# Patient Record
Sex: FEMALE | Race: BLACK OR AFRICAN AMERICAN | Employment: UNEMPLOYED | URBAN - METROPOLITAN AREA
[De-identification: names, ages, dates, MRNs, and addresses within clinical notes are randomized per-mention and may not be internally consistent; named-entity substitution may affect disease eponyms.]

---

## 2018-03-17 ENCOUNTER — APPOINTMENT (OUTPATIENT)
Dept: CT IMAGING | Age: 38
End: 2018-03-17
Attending: EMERGENCY MEDICINE
Payer: MEDICAID

## 2018-03-17 ENCOUNTER — HOSPITAL ENCOUNTER (EMERGENCY)
Age: 38
Discharge: HOME | End: 2018-03-17
Attending: EMERGENCY MEDICINE | Admitting: EMERGENCY MEDICINE
Payer: MEDICAID

## 2018-03-17 VITALS
TEMPERATURE: 97.9 F | SYSTOLIC BLOOD PRESSURE: 159 MMHG | HEART RATE: 78 BPM | HEIGHT: 71 IN | RESPIRATION RATE: 18 BRPM | OXYGEN SATURATION: 98 % | DIASTOLIC BLOOD PRESSURE: 86 MMHG | WEIGHT: 276 LBS | BODY MASS INDEX: 38.64 KG/M2

## 2018-03-17 DIAGNOSIS — R51.9 ACUTE NONINTRACTABLE HEADACHE, UNSPECIFIED HEADACHE TYPE: Primary | ICD-10-CM

## 2018-03-17 PROCEDURE — 70450 CT HEAD/BRAIN W/O DYE: CPT

## 2018-03-17 PROCEDURE — 99283 EMERGENCY DEPT VISIT LOW MDM: CPT

## 2018-03-17 RX ORDER — TRAMADOL HYDROCHLORIDE 50 MG/1
50 TABLET ORAL
Qty: 10 TAB | Refills: 0 | Status: SHIPPED | OUTPATIENT
Start: 2018-03-17 | End: 2018-03-27

## 2018-03-17 RX ORDER — AMLODIPINE BESYLATE 10 MG/1
TABLET ORAL DAILY
COMMUNITY
End: 2018-07-12 | Stop reason: SDUPTHER

## 2018-03-18 NOTE — ED NOTES
Patient present to ED with c/o headache and dizziness that comes and goes. Patient also states right hand tingling around the fingers. Patient states that she is prescribed blood pressure medication that she does not take consistently.

## 2018-03-18 NOTE — DISCHARGE INSTRUCTIONS

## 2018-03-18 NOTE — ED NOTES
Emergency Department Nursing Plan of Care       The Nursing Plan of Care is developed from the Nursing assessment and Emergency Department Attending provider initial evaluation. The plan of care may be reviewed in the ED Provider note. The Plan of Care was developed with the following considerations:   Patient / Family readiness to learn indicated by:verbalized understanding  Persons(s) to be included in education: patient  Barriers to Learning/Limitations:No    Signed     [address] Sudarshan Jj RN    3/17/2018   10:36 PM      I have reviewed discharge instructions with the patient. The patient verbalized understanding. Patient ambulate out of ED in no acute distress.

## 2018-03-18 NOTE — ED PROVIDER NOTES
EMERGENCY DEPARTMENT HISTORY AND PHYSICAL EXAM      Date: 3/17/2018  Patient Name: Art Vuong    History of Presenting Illness     Chief Complaint   Patient presents with    Dizziness     Pt has hx of HTN. Hasn't taken her Amlodipine in months. Pt states her BP was high earlier today so she took her bp medication. Pt c.o tingling in R hand, headache, dizzyness, and sharp pains in her face. History Provided By: Patient    HPI: Art Vuong, 42 y.o. female with PMHx significant for HTN, presents ambulatory to the ED with cc of a 3/10 aching left sided intermittent headache. She states that she was diagnosed with HTN a few months ago and was prescribed amlodipine, but denies having been taking it regularly. She states, \" I am in denial of my HTN. \" She reports having checked her blood pressure at home today and notes that it was 176/103. She denies ear pain and sore throat. She also denies having taken anything for her headache. PCP: No primary care provider on file. There are no other complaints, changes, or physical findings at this time. Current Outpatient Prescriptions   Medication Sig Dispense Refill    amLODIPine (NORVASC) 10 mg tablet Take  by mouth daily. Past History     Past Medical History:  Past Medical History:   Diagnosis Date    Hypertension        Past Surgical History:  History reviewed. No pertinent surgical history. Family History:  History reviewed. No pertinent family history. Social History:  Social History   Substance Use Topics    Smoking status: Current Every Day Smoker     Packs/day: 0.25    Smokeless tobacco: Never Used    Alcohol use None       Allergies:  No Known Allergies      Review of Systems   Review of Systems   Constitutional: Negative. Negative for activity change, chills and diaphoresis. HENT: Negative. Negative for ear pain, sore throat, trouble swallowing and voice change. Eyes: Negative.     Respiratory: Negative for chest tightness and shortness of breath. Cardiovascular: Negative for chest pain, palpitations and leg swelling. Gastrointestinal: Negative. Negative for constipation, nausea and vomiting. Endocrine: Negative. Genitourinary: Negative. Negative for flank pain, frequency and hematuria. Musculoskeletal: Negative. Skin: Negative. Allergic/Immunologic: Negative. Neurological: Positive for headaches. Hematological: Negative. Psychiatric/Behavioral: Negative. All other systems reviewed and are negative. Physical Exam   Physical Exam   Constitutional: She is oriented to person, place, and time. She appears well-developed and well-nourished. HENT:   Head: Normocephalic. Mouth/Throat: Oropharynx is clear and moist.   Eyes: Conjunctivae and EOM are normal. Pupils are equal, round, and reactive to light. Neck: Normal range of motion. Neck supple. Cardiovascular: Normal rate, regular rhythm, normal heart sounds and intact distal pulses. Pulmonary/Chest: Effort normal and breath sounds normal.   Abdominal: Soft. Bowel sounds are normal. She exhibits no distension. There is no rebound. Musculoskeletal: Normal range of motion. She exhibits no edema or deformity. Neurological: She is alert and oriented to person, place, and time. Skin: Skin is warm and dry. Psychiatric: She has a normal mood and affect. Her behavior is normal. Judgment and thought content normal.   Nursing note and vitals reviewed. Diagnostic Study Results     Radiologic Studies -   CT HEAD WO CONT   Final Result      EXAM:  CT HEAD WO CONT  Clinical history: Head trauma, dizziness, headache  INDICATION:   Head trauma, closed, mild, GCS >= 13, no risk factors, neuro exam  normal     COMPARISON: None.     CONTRAST:  None.     TECHNIQUE: Unenhanced CT of the head was performed using 5 mm images. Brain and  bone windows were generated.   CT dose reduction was achieved through use of a  standardized protocol tailored for this examination and automatic exposure  control for dose modulation.       FINDINGS:  The ventricles and sulci are normal in size, shape and configuration and  midline. There is no significant white matter disease. There is no intracranial  hemorrhage, extra-axial collection, mass, mass effect or midline shift. The  basilar cisterns are open. No acute infarct is identified. The bone windows  demonstrate no abnormalities. The visualized portions of the paranasal sinuses  and mastoid air cells are clear.     IMPRESSION  IMPRESSION:      No acute intracranial process      Medical Decision Making   I am the first provider for this patient. I reviewed the vital signs, available nursing notes, past medical history, past surgical history, family history and social history. Vital Signs-Reviewed the patient's vital signs. Patient Vitals for the past 12 hrs:   Temp Pulse Resp BP SpO2   03/17/18 2127 97.9 °F (36.6 °C) 78 18 159/86 98 %     Records Reviewed: Nursing Notes and Old Medical Records    Provider Notes (Medical Decision Making):   DDx: tension headache, migraine headache, uncontrolled HTN    ED Course:   Initial assessment performed. The patients presenting problems have been discussed, and they are in agreement with the care plan formulated and outlined with them. I have encouraged them to ask questions as they arise throughout their visit. Disposition:  DISCHARGE NOTE  10:33 PM  The patient has been re-evaluated and is ready for discharge. Reviewed available results with patient. Counseled pt on diagnosis and care plan. Pt has expressed understanding, and all questions have been answered. Pt agrees with plan and agrees to F/U as recommended, or return to the ED if their sxs worsen. Discharge instructions have been provided and explained to the pt, along with reasons to return to the ED. PLAN:  1.    Discharge Medication List as of 3/17/2018 10:33 PM      START taking these medications    Details traMADol (ULTRAM) 50 mg tablet Take 1 Tab by mouth every six (6) hours as needed for Pain for up to 10 days. Max Daily Amount: 200 mg., Print, Disp-10 Tab, R-0         CONTINUE these medications which have NOT CHANGED    Details   amLODIPine (NORVASC) 10 mg tablet Take  by mouth daily. , Historical Med           2. Follow-up Information     Follow up With Details Comments Contact Info    None Call  None [phone] Patient stated that they have no PCP      [facility] EMERGENCY DEPT  As needed, If symptoms worsen [address] [facility]  [phone]        Return to ED if worse     Diagnosis     Clinical Impression:   1. Acute nonintractable headache, unspecified headache type        Attestations: This note is prepared by Rayo Chung, acting as Scribe for Dr. Arneta Merlin, MD.    Dr. Arneta Merlin, MD: The scribe's documentation has been prepared under my direction and personally reviewed by me in its entirety. I confirm that the note above accurately reflects all work, treatment, procedures, and medical decision making performed by me.

## 2018-04-17 ENCOUNTER — OFFICE VISIT (OUTPATIENT)
Dept: OBGYN CLINIC | Age: 38
End: 2018-04-17

## 2018-04-17 VITALS
RESPIRATION RATE: 18 BRPM | TEMPERATURE: 97.8 F | SYSTOLIC BLOOD PRESSURE: 130 MMHG | WEIGHT: 267.8 LBS | HEART RATE: 78 BPM | BODY MASS INDEX: 37.49 KG/M2 | DIASTOLIC BLOOD PRESSURE: 73 MMHG | HEIGHT: 71 IN

## 2018-04-17 DIAGNOSIS — Z01.411 ENCOUNTER FOR GYNECOLOGICAL EXAMINATION WITH ABNORMAL FINDING: Primary | ICD-10-CM

## 2018-04-17 DIAGNOSIS — Z12.4 PAP SMEAR FOR CERVICAL CANCER SCREENING: ICD-10-CM

## 2018-04-17 DIAGNOSIS — N89.8 VAGINAL DISCHARGE: ICD-10-CM

## 2018-04-17 DIAGNOSIS — Z12.31 SCREENING MAMMOGRAM, ENCOUNTER FOR: ICD-10-CM

## 2018-04-17 DIAGNOSIS — N92.0 MENORRHAGIA WITH REGULAR CYCLE: ICD-10-CM

## 2018-04-17 RX ORDER — MEDROXYPROGESTERONE ACETATE 10 MG/1
10 TABLET ORAL DAILY
Qty: 10 TAB | Refills: 2 | Status: SHIPPED | OUTPATIENT
Start: 2018-04-17 | End: 2018-04-27

## 2018-04-17 RX ORDER — HYDROCHLOROTHIAZIDE 25 MG/1
TABLET ORAL
COMMUNITY
Start: 2018-03-21 | End: 2018-06-14 | Stop reason: SDUPTHER

## 2018-04-17 NOTE — PATIENT INSTRUCTIONS
Levonorgestrel (Into the uterus)   Levonorgestrel (ydm-ngu-kyw-MAGED-trel)  Prevents pregnancy and treats heavy menstrual bleeding. This is an intrauterine device (IUD), which is a reversible form of birth control. This IUD slowly releases levonorgestrel, a hormone. Brand Name(s): Darlinaydin [place], Mirena, [place]   There may be other brand names for this medicine. When This Medicine Should Not Be Used: This device is not right for everyone. Do not use it if you had an allergic reaction to levonorgestrel, or if you are pregnant. How to Use This Medicine:   Device  · A nurse or other trained health professional will give you this medicine. · The IUD is usually inserted by your doctor during your monthly period. You will need to see your doctor 4 to 6 weeks after the IUD is placed and then once a year. · Your IUD has a string or \"tail\" that is made of plastic thread. About one or two inches of this string hangs into your vagina. You cannot see this string, and it will not cause problems when you have sex. Check your IUD after each monthly period. You may not be protected against pregnancy if you cannot feel the string or if you feel plastic. Do the following to check the placement of your IUD:  [facility] your hands with soap and warm water. Dry them with a clean towel. ¨ Bend your knees and squat low to the ground. ¨ Gently put your index finger high inside your vagina. The cervix is at the top of the vagina. Find the IUD string coming from your cervix. Never pull on the string. You should not be able to feel the plastic of the IUD itself. Wash your hands after you are done checking your IUD string. · Your doctor will need to remove your IUD after 3 years for [place], after 4 years for WISHANTE, or after 5 years for [facility] or [facility]. You will also need to have it replaced if it comes out of your uterus. If you are using Mirena® or Liletta® and want to stop, your doctor can remove it at any time.  However, you may become pregnant as soon as Mirena® is removed or if you have intercourse the week before Wypeymana Edilberto is removed. Use another form of birth control or have a new IUD inserted to keep from getting pregnant. Drugs and Foods to Avoid:   Ask your doctor or pharmacist before using any other medicine, including over-the-counter medicines, vitamins, and herbal products. · Some medicines can affect how this device works. Tell your doctor if you are using a blood thinner (including warfarin). Warnings While Using This Medicine:   · Tell your doctor if you are breastfeeding, or if you had a baby, miscarriage, or  in the past 3 months. Tell your doctor if you have liver disease (including tumor or cancer), heart disease, breast cancer, heart or blood circulation problems, migraine, high blood pressure, or a history of heart valve problems, blood clotting problems, stroke, or heart attack. Tell your doctor if you have problems with your immune system or have had surgery on your female organs (especially fallopian tubes). · Tell your doctor if you have had any problems, infections, or other conditions that affected your reproductive system. There are many problems that could make an IUD a bad choice for you, including if you have fibroids, unexplained bleeding, a uterus that has an unusual shape, a recent infection, a history of pelvic inflammatory disease, an abnormal Pap test, ectopic pregnancy, cancer or suspected cancer, or an existing IUD. · There is a small chance that you could get pregnant when using an IUD, just as there is with any birth control. If you get pregnant, your doctor may remove your IUD to lower the risk of miscarriage or other problems.   · This medicine may cause the following problems:  ¨ Increased risk of ectopic pregnancy (pregnancy outside the uterus)  ¨ Increased risk of serious infections, including sepsis  ¨ Increased risk of pelvic inflammatory disease (PID) or endometritis  ¨ Perforation (hole in the wall of your uterus), which can damage other organs  ¨ Increased risk for ovarian cysts  ¨ Increased risk of breast cancer  ¨ Increased risk of high blood pressure, stroke, heart attack, or clotting problems  · You might have some spotting and cramping during the first weeks after the IUD has been inserted. These symptoms should decrease or go away within a few weeks up to 6 months. · You could have less bleeding or even stop having periods by the end of the first year. Call your doctor if you have a change from your regular bleeding pattern after you have had your IUD for awhile, such as more bleeding or if you miss a period (and you were having periods even with your IUD). · An IUD can slip partly or all the way out of your uterus. If this happens, use condoms or another form of birth control, and call your doctor right away. · This IUD will not protect you from HIV/AIDS or other sexually transmitted diseases. · If you have the [address] or Haleya Tiffanie, tell your healthcare provider before you have an MRI test.  · Your doctor will check your progress and the effects of this medicine at regular visits. Keep all appointments.   Possible Side Effects While Using This Medicine:   Call your doctor right away if you notice any of these side effects:  · Allergic reaction: Itching or hives, swelling in your face or hands, swelling or tingling in your mouth or throat, chest tightness, trouble breathing  · Chest pain, problems with speech or walking, numbness or weakness in your arm or leg or on one side of your body  · Heavy bleeding from your vagina  · Pain during sex, or if your partner feels the hard plastic of the IUD during sex  · Severe headache, vision changes  · Stomach or pelvic pain, tenderness, or cramping that is sudden or severe  · Unusual bleeding, bruising, or weakness  · Vaginal discharge that has a bad smell, fever, chills, sores on your genitals  · Yellow skin or eyes  If you notice these less serious side effects, talk with your doctor:   · Acne or other skin changes  · Breast pain  · Change in bleeding pattern after the first few months  · Dizziness or lightheadedness after IUD is placed  · Mild itching around your vagina and genitals  If you notice other side effects that you think are caused by this medicine, tell your doctor. Call your doctor for medical advice about side effects. You may report side effects to FDA at 6-928-QQJ-9947  © 2017 [address] [employer] [employer] Information is for End User's use only and may not be sold, redistributed or otherwise used for commercial purposes. The above information is an  only. It is not intended as medical advice for individual conditions or treatments. Talk to your doctor, nurse or pharmacist before following any medical regimen to see if it is safe and effective for you. Pelvic Exam: Care Instructions  Your Care Instructions    When your doctor examines all of your pelvic organs, it's called a pelvic exam. Two good reasons to have this kind of exam are to check for sexually transmitted infections (STIs) and to get a Pap test. A Pap test is also called a Pap smear. It checks for early changes that can lead to cancer of the cervix. Sometimes a pelvic exam is part of a regular checkup. In this case, you can do some things to make your test results as accurate as possible. · Try to schedule the exam when you don't have your period. · Don't use douches, tampons, or vaginal medicines, sprays, or powders for 24 hours before your exam.  · Don't have sex for 24 hours before your exam.  Other times, women have this kind of exam at any time of the month. This is because they have pelvic pain, bleeding, or discharge. Or they may have another pelvic problem. Before your exam, it's important to share some information with your doctor. For example, if you are a survivor of rape or sexual abuse, you can talk about any concerns you may have.  Your doctor will also want to know if you are pregnant or use birth control. And he or she will want to hear about any problems, surgeries, or procedures you have had in your pelvic area. You will also need to tell your doctor when your last period was. Follow-up care is a key part of your treatment and safety. Be sure to make and go to all appointments, and call your doctor if you are having problems. It's also a good idea to know your test results and keep a list of the medicines you take. How is a pelvic exam done? · During a pelvic exam, you will:  ¨ Take off your clothes below the waist. You will get a paper or cloth cover to put over the lower half of your body. Tamica Loges on your back on an exam table. Your feet will be raised above you. Stirrups will support your feet. · The doctor will:  Lorraine Coreen you to relax your knees. Your knees need to lean out, toward the walls. ¨ Check the opening of your vagina for sores or swelling. ¨ Gently put a tool called a speculum into your vagina. It opens the vagina a little bit. You will feel some pressure. But if you are relaxed, it will not hurt. It lets your doctor see inside the vagina. ¨ Use a small brush, spatula, or swab to get a sample of cells, if you are having a Pap test or culture. The doctor then removes the speculum. ¨ Put on gloves and put one or two fingers of one hand into your vagina. The other hand goes on your lower belly. This lets your doctor feel your pelvic organs. You will probably feel some pressure. Try to stay relaxed. ¨ Put one gloved finger into your rectum and one into your vagina, if needed. This can also help check your pelvic organs. This exam takes about 10 minutes. At the end, you will get a washcloth or tissue to clean your vaginal area. It's normal to have some discharge after this exam. You can then get dressed. Some test results may be ready right away. But results from a culture or a Pap test may take several days or a few weeks.   Why should you have a pelvic exam?  · You want to have recommended screening tests. This includes a Pap test.  · You think you have a vaginal infection. Signs include itching, burning, or unusual discharge. · You might have been exposed to a sexually transmitted infection (STI), such as chlamydia or herpes. · You have vaginal bleeding that is not part of your normal menstrual period. · You have pain in your belly or pelvis. · You have been sexually assaulted. A pelvic exam lets your doctor collect evidence and check for STIs. · You are pregnant. · You are having trouble getting pregnant. What are the risks of a pelvic exam?  There are no risks from a pelvic exam.  When should you call for help? Watch closely for changes in your health, and be sure to contact your doctor if you have any problems. Where can you learn more? Go to http://tanisha-verito.info/. Enter N369 in the search box to learn more about \"Pelvic Exam: Care Instructions. \"  Current as of: October 13, 2016  Content Version: 11.4  © 3881-0998 [employer]. Care instructions adapted under license by [employer] (which disclaims liability or warranty for this information). If you have questions about a medical condition or this instruction, always ask your healthcare professional. [address] any warranty or liability for your use of this information. Learning About Benefits From Quitting Smoking  How does quitting smoking make you healthier? If you're thinking about quitting smoking, you may have a few reasons to be smoke-free. Your health may be one of them. · When you quit smoking, you lower your risks for cancer, lung disease, heart attack, stroke, blood vessel disease, and blindness from macular degeneration. · When you're smoke-free, you get sick less often, and you heal faster. You are less likely to get colds, flu, bronchitis, and pneumonia.   · As a nonsmoker, you may find that your mood is better and you are less stressed. When and how will you feel healthier? Quitting has real health benefits that start from day 1 of being smoke-free. And the longer you stay smoke-free, the healthier you get and the better you feel. The first hours  · After just 20 minutes, your blood pressure and heart rate go down. That means there's less stress on your heart and blood vessels. · Within 12 hours, the level of carbon monoxide in your blood drops back to normal. That makes room for more oxygen. With more oxygen in your body, you may notice that you have more energy than when you smoked. After 2 weeks  · Your lungs start to work better. · Your risk of heart attack starts to drop. After 1 month  · When your lungs are clear, you cough less and breathe deeper, so it's easier to be active. · Your sense of taste and smell return. That means you can enjoy food more than you have since you started smoking. Over the years  · After 1 year, your risk of heart disease is half what it would be if you kept smoking. · After 5 years, your risk of stroke starts to shrink. Within a few years after that, it's about the same as if you'd never smoked. · After 10 years, your risk of dying from lung cancer is cut by about half. And your risk for many other types of cancer is lower too. How would quitting help others in your life? When you quit smoking, you improve the health of everyone who now breathes in your smoke. · Their heart, lung, and cancer risks drop, much like yours. · They are sick less. For babies and small children, living smoke-free means they're less likely to have ear infections, pneumonia, and bronchitis. · If you're a woman who is or will be pregnant someday, quitting smoking means a healthier . · Children who are close to you are less likely to become adult smokers. Where can you learn more? Go to http://tanisha-verito.info/.   Enter [phone] [id number] 45 in the search box to learn more about \"Learning About Benefits From Quitting Smoking. \"  Current as of: March 20, 2017  Content Version: 11.4  © 1456-6308 [employer]. Care instructions adapted under license by [employer] (which disclaims liability or warranty for this information). If you have questions about a medical condition or this instruction, always ask your healthcare professional. [address] any warranty or liability for your use of this information. Stopping Smoking: Care Instructions  Your Care Instructions    Cigarette smokers crave the nicotine in cigarettes. Giving it up is much harder than simply changing a habit. Your body has to stop craving the nicotine. It is hard to quit, but you can do it. There are many tools that people use to quit smoking. You may find that combining tools works best for you. There are several steps to quitting. First you get ready to quit. Then you get support to help you. After that, you learn new skills and behaviors to become a nonsmoker. For many people, a necessary step is getting and using medicine. Your doctor will help you set up the plan that best meets your needs. You may want to attend a smoking cessation program to help you quit smoking. When you choose a program, look for one that has proven success. Ask your doctor for ideas. You will greatly increase your chances of success if you take medicine as well as get counseling or join a cessation program.  Some of the changes you feel when you first quit tobacco are uncomfortable. Your body will miss the nicotine at first, and you may feel short-tempered and grumpy. You may have trouble sleeping or concentrating. Medicine can help you deal with these symptoms. You may struggle with changing your smoking habits and rituals. The last step is the tricky one: Be prepared for the smoking urge to continue for a time. This is a lot to deal with, but keep at it.  You will feel better. Follow-up care is a key part of your treatment and safety. Be sure to make and go to all appointments, and call your doctor if you are having problems. It's also a good idea to know your test results and keep a list of the medicines you take. How can you care for yourself at home? · Ask your family, friends, and coworkers for support. You have a better chance of quitting if you have help and support. · Join a support group, such as Nicotine Anonymous, for people who are trying to quit smoking. · Consider signing up for a smoking cessation program, such as the American Lung Association's Freedom from Smoking program.  · Set a quit date. Pick your date carefully so that it is not right in the middle of a big deadline or stressful time. Once you quit, do not even take a puff. Get rid of all ashtrays and lighters after your last cigarette. Clean your house and your clothes so that they do not smell of smoke. · Learn how to be a nonsmoker. Think about ways you can avoid those things that make you reach for a cigarette. ¨ Avoid situations that put you at greatest risk for smoking. For some people, it is hard to have a drink with friends without smoking. For others, they might skip a coffee break with coworkers who smoke. ¨ Change your daily routine. Take a different route to work or eat a meal in a different place. · Cut down on stress. Calm yourself or release tension by doing an activity you enjoy, such as reading a book, taking a hot bath, or gardening. · Talk to your doctor or pharmacist about nicotine replacement therapy, which replaces the nicotine in your body. You still get nicotine but you do not use tobacco. Nicotine replacement products help you slowly reduce the amount of nicotine you need.  These products come in several forms, many of them available over-the-counter:  ¨ Nicotine patches  ¨ Nicotine gum and lozenges  ¨ Nicotine inhaler  · Ask your doctor about bupropion (Wellbutrin) or varenicline (Chantix), which are prescription medicines. They do not contain nicotine. They help you by reducing withdrawal symptoms, such as stress and anxiety. · Some people find hypnosis, acupuncture, and massage helpful for ending the smoking habit. · Eat a healthy diet and get regular exercise. Having healthy habits will help your body move past its craving for nicotine. · Be prepared to keep trying. Most people are not successful the first few times they try to quit. Do not get mad at yourself if you smoke again. Make a list of things you learned and think about when you want to try again, such as next week, next month, or next year. Where can you learn more? Go to http://tanishainstruMagicverito.info/. Enter Q255 in the search box to learn more about \"Stopping Smoking: Care Instructions. \"  Current as of: March 20, 2017  Content Version: 11.4  © 9383-7097 [employer]. Care instructions adapted under license by [employer] (which disclaims liability or warranty for this information). If you have questions about a medical condition or this instruction, always ask your healthcare professional. [place] [address] any warranty or liability for your use of this information. Deciding About Using Medicines To Quit Smoking  Deciding About Using Medicines To Quit Smoking  What are the medicines you can use? Your doctor may prescribe varenicline (Chantix) or bupropion (Zyban). These medicines can help you cope with cravings for tobacco. They are pills that don't contain nicotine. You also can use nicotine replacement products. These do contain nicotine. There are many types. · Gum and lozenges slowly release nicotine into your mouth. · Patches stick to your skin. They slowly release nicotine into your bloodstream.  · An inhaler has a alcantar that contains nicotine. You breathe in a puff of nicotine vapor through your mouth and throat.   · Nasal spray releases a mist that contains nicotine. What are key points about this decision? · Using medicines can double your chances of quitting smoking. They can ease cravings and withdrawal symptoms. · Getting counseling along with using medicine can raise your chances of quitting even more. · If you smoke fewer than 5 cigarettes a day, you may not need medicines to help you quit smoking. · These medicines have less nicotine than cigarettes. And by itself, nicotine is not nearly as harmful as smoking. The tars, carbon monoxide, and other toxic chemicals in tobacco cause the harmful effects. · The side effects of nicotine replacement products depend on the type of product. For example, a patch can make your skin red and itchy. Medicines in pill form can make you sick to your stomach. They can also cause dry mouth and trouble sleeping. For most people, the side effects are not bad enough to make them stop using the products. Why might you choose to use medicines to quit smoking? · You have tried on your own to stop smoking, but you were not able to stop. · You smoke more than 5 cigarettes a day. · You want to increase your chances of quitting smoking. · You want to reduce your cravings and withdrawal symptoms. · You feel the benefits of medicine outweigh the side effects. Why might you choose not to use medicine? · You want to try quitting on your own by stopping all at once (\"cold turkey\"). · You want to cut back slowly on the number of cigarettes you smoke. · You smoke fewer than 5 cigarettes a day. · You do not like using medicine. · You feel the side effects of medicines outweigh the benefits. · You are worried about the cost of medicines. Your decision  Thinking about the facts and your feelings can help you make a decision that is right for you. Be sure you understand the benefits and risks of your options, and think about what else you need to do before you make the decision. Where can you learn more?   Go to http://tanisha-verito.info/. Enter C127 in the search box to learn more about \"Deciding About Using Medicines To Quit Smoking. \"  Current as of: March 20, 2017  Content Version: 11.4  © 7980-1580 [employer]. Care instructions adapted under license by [employer] (which disclaims liability or warranty for this information). If you have questions about a medical condition or this instruction, always ask your healthcare professional. [address] any warranty or liability for your use of this information. Kegel Exercises: Care Instructions  Your Care Instructions    Kegel exercises strengthen muscles around the bladder. These muscles control the flow of urine. Kegel exercises are sometime called \"pelvic floor\" exercises. They can help prevent urine leakage and keep the pelvic organs in place. A woman who just had a baby might want to try Kegel exercises. They can strengthen pelvic muscles that have been weakened by pregnancy and childbirth. A man or woman may use Kegel exercises to treat urine leakage. You do Kegel exercises by tightening the muscles you use when you urinate. You will likely need to do these exercises for several weeks to get better. Follow-up care is a key part of your treatment and safety. Be sure to make and go to all appointments, and call your doctor if you are having problems. It's also a good idea to know your test results and keep a list of the medicines you take. How can you care for yourself at home? · Do Kegel exercises. ¨ Find the muscles you need to strengthen. To do this, tighten the muscles that stop your urine while you are going to the bathroom. These are the same muscles you squeeze during Kegel exercises. ¨ Squeeze the muscles as hard as you can. Your belly and thighs should not move. ¨ Hold the squeeze for 3 seconds. Then relax for 3 seconds.   ¨ Start with 3 seconds, and then add 1 second each week until you are able to squeeze for 10 seconds. ¨ Repeat the exercise 10 to 15 times for each session. Do three or more sessions each day. · You can check to see if you are using the right muscles. Place a finger in your vagina and squeeze around it. You are doing them right when you feel pressure around your finger. Your doctor may also suggest that you put special weights in your vagina while you do the exercises. · Do not smoke. It can irritate the bladder. If you need help quitting, talk to your doctor about stop-smoking programs and medicines. These can increase your chances of quitting for good. Where can you learn more? Go to http://tanisha-verito.info/. Enter L984 in the search box to learn more about \"Kegel Exercises: Care Instructions. \"  Current as of: May 12, 2017  Content Version: 11.4  © 1165-0289 [employer]. Care instructions adapted under license by [employer] (which disclaims liability or warranty for this information). If you have questions about a medical condition or this instruction, always ask your healthcare professional. [address] any warranty or liability for your use of this information.

## 2018-04-17 NOTE — PROGRESS NOTES
Chief Complaint   Patient presents with    Well Woman     Pt presents in stable condition, no complaints.

## 2018-04-17 NOTE — MR AVS SNAPSHOT
[address] [facility] [address] 1400 [address] [place] [address] 
[phone] Patient: Rosita Kelly MRN: [id number] :1980 Visit Information Date & Time Provider Department Dept. Phone Encounter #  
 2018 10:00 AM SARAH Shearer [facility] O[facility]YN AT [address] [facility] [id number] Follow-up Instructions Return in about 1 year (around 2019). Upcoming Health Maintenance Date Due  
 PAP AKA CERVICAL CYTOLOGY 1999 Influenza Age 5 to Adult 2017 Allergies as of 2018  Review Complete On: 2018 By: Sheyla Bales MD  
 No Known Allergies Current Immunizations  Never Reviewed No immunizations on file. Not reviewed this visit You Were Diagnosed With   
  
 Codes Comments Menorrhagia with regular cycle    -  Primary ICD-10-CM: N92.0 ICD-9-CM: 626.2 Encounter for gynecological examination with abnormal finding     ICD-10-CM: Z01.411 ICD-9-CM: V72.31 Vaginal discharge     ICD-10-CM: N89.8 ICD-9-CM: 623.5 Pap smear for cervical cancer screening     ICD-10-CM: Z12.4 ICD-9-CM: V76.2 Screening mammogram, encounter for     ICD-10-CM: Z12.31 
ICD-9-CM: V76.12 Vitals BP Pulse Temp Resp Height(growth percentile) Weight(growth percentile) 130/73 (BP 1 Location: Right arm, BP Patient Position: Sitting) 78 97.8 °F (36.6 °C) (Oral) 18 5' 11\" (1.803 m) 267 lb 12.8 oz (121.5 kg) LMP BMI Smoking Status 2018 37.35 kg/m2 Current Every Day Smoker Vitals History BMI and BSA Data Body Mass Index Body Surface Area  
 37.35 kg/m 2 2.47 m 2 Preferred Pharmacy Pharmacy Name Phone [address], [place] [phone] Your Updated Medication List  
  
   
This list is accurate as of 18 10:52 AM.  Always use your most recent med list. amLODIPine 10 mg tablet Commonly known as:  Carmichelle Dupes Take  by mouth daily. Pt takes 1/2 tablet. hydroCHLOROthiazide 25 mg tablet Commonly known as:  HYDRODIURIL  
  
 medroxyPROGESTERone 10 mg tablet Commonly known as:  PROVERA Take 1 Tab by mouth daily for 10 days. Prescriptions Sent to Pharmacy Refills  
 medroxyPROGESTERone (PROVERA) 10 mg tablet 2 Sig: Take 1 Tab by mouth daily for 10 days. Class: Normal  
 Pharmacy: [facility] [address]KLIN [address] [place] [address], [address] [place] [address] #: [phone] Route: Oral  
  
Follow-up Instructions Return in about 1 year (around 4/17/2019). Patient Instructions Levonorgestrel (Into the uterus) Levonorgestrel (kgj-oej-ynw-MAGED-trel) Prevents pregnancy and treats heavy menstrual bleeding. This is an intrauterine device (IUD), which is a reversible form of birth control. This IUD slowly releases levonorgestrel, a hormone. Brand Name(s): Superior Perico There may be other brand names for this medicine. When This Medicine Should Not Be Used: This device is not right for everyone. Do not use it if you had an allergic reaction to levonorgestrel, or if you are pregnant. How to Use This Medicine:  
Device · A nurse or other trained health professional will give you this medicine. · The IUD is usually inserted by your doctor during your monthly period. You will need to see your doctor 4 to 6 weeks after the IUD is placed and then once a year. · Your IUD has a string or \"tail\" that is made of plastic thread. About one or two inches of this string hangs into your vagina. You cannot see this string, and it will not cause problems when you have sex. Check your IUD after each monthly period. You may not be protected against pregnancy if you cannot feel the string or if you feel plastic. Do the following to check the placement of your IUD: 
[facility] AUTHORITY your hands with soap and warm water. Dry them with a clean towel. ¨ Bend your knees and squat low to the ground. ¨ Gently put your index finger high inside your vagina. The cervix is at the top of the vagina. Find the IUD string coming from your cervix. Never pull on the string. You should not be able to feel the plastic of the IUD itself. Wash your hands after you are done checking your IUD string. · Your doctor will need to remove your IUD after 3 years for [place], after 4 years for WIEDEN, or after 5 years for [facility] or [place] [address]. You will also need to have it replaced if it comes out of your uterus. If you are using Mirena® or Liletta® and want to stop, your doctor can remove it at any time. However, you may become pregnant as soon as Mirena® is removed or if you have intercourse the week before WIEDEN is removed. Use another form of birth control or have a new IUD inserted to keep from getting pregnant. Drugs and Foods to Avoid: Ask your doctor or pharmacist before using any other medicine, including over-the-counter medicines, vitamins, and herbal products. · Some medicines can affect how this device works. Tell your doctor if you are using a blood thinner (including warfarin). Warnings While Using This Medicine: · Tell your doctor if you are breastfeeding, or if you had a baby, miscarriage, or  in the past 3 months. Tell your doctor if you have liver disease (including tumor or cancer), heart disease, breast cancer, heart or blood circulation problems, migraine, high blood pressure, or a history of heart valve problems, blood clotting problems, stroke, or heart attack. Tell your doctor if you have problems with your immune system or have had surgery on your female organs (especially fallopian tubes). · Tell your doctor if you have had any problems, infections, or other conditions that affected your reproductive system.  There are many problems that could make an IUD a bad choice for you, including if you have fibroids, unexplained bleeding, a uterus that has an unusual shape, a recent infection, a history of pelvic inflammatory disease, an abnormal Pap test, ectopic pregnancy, cancer or suspected cancer, or an existing IUD. · There is a small chance that you could get pregnant when using an IUD, just as there is with any birth control. If you get pregnant, your doctor may remove your IUD to lower the risk of miscarriage or other problems. · This medicine may cause the following problems: 
¨ Increased risk of ectopic pregnancy (pregnancy outside the uterus) ¨ Increased risk of serious infections, including sepsis ¨ Increased risk of pelvic inflammatory disease (PID) or endometritis ¨ Perforation (hole in the wall of your uterus), which can damage other organs ¨ Increased risk for ovarian cysts ¨ Increased risk of breast cancer ¨ Increased risk of high blood pressure, stroke, heart attack, or clotting problems · You might have some spotting and cramping during the first weeks after the IUD has been inserted. These symptoms should decrease or go away within a few weeks up to 6 months. · You could have less bleeding or even stop having periods by the end of the first year. Call your doctor if you have a change from your regular bleeding pattern after you have had your IUD for awhile, such as more bleeding or if you miss a period (and you were having periods even with your IUD). · An IUD can slip partly or all the way out of your uterus. If this happens, use condoms or another form of birth control, and call your doctor right away. · This IUD will not protect you from HIV/AIDS or other sexually transmitted diseases. · If you have the Shira Lockwood or Ayala Parisi, tell your healthcare provider before you have an MRI test. 
· Your doctor will check your progress and the effects of this medicine at regular visits. Keep all appointments. Possible Side Effects While Using This Medicine: Call your doctor right away if you notice any of these side effects: · Allergic reaction: Itching or hives, swelling in your face or hands, swelling or tingling in your mouth or throat, chest tightness, trouble breathing · Chest pain, problems with speech or walking, numbness or weakness in your arm or leg or on one side of your body · Heavy bleeding from your vagina · Pain during sex, or if your partner feels the hard plastic of the IUD during sex · Severe headache, vision changes · Stomach or pelvic pain, tenderness, or cramping that is sudden or severe · Unusual bleeding, bruising, or weakness · Vaginal discharge that has a bad smell, fever, chills, sores on your genitals · Yellow skin or eyes If you notice these less serious side effects, talk with your doctor: · Acne or other skin changes · Breast pain · Change in bleeding pattern after the first few months · Dizziness or lightheadedness after IUD is placed · Mild itching around your vagina and genitals If you notice other side effects that you think are caused by this medicine, tell your doctor. Call your doctor for medical advice about side effects. You may report side effects to FDA at 2-708-FDA-9907 © 2017 [address] Information is for End User's use only and may not be sold, redistributed or otherwise used for commercial purposes. The above information is an  only. It is not intended as medical advice for individual conditions or treatments. Talk to your doctor, nurse or pharmacist before following any medical regimen to see if it is safe and effective for you. Pelvic Exam: Care Instructions Your Care Instructions When your doctor examines all of your pelvic organs, it's called a pelvic exam. Two good reasons to have this kind of exam are to check for sexually transmitted infections (STIs) and to get a Pap test. A Pap test is also called a Pap smear. It checks for early changes that can lead to cancer of the cervix. Sometimes a pelvic exam is part of a regular checkup. In this case, you can do some things to make your test results as accurate as possible. · Try to schedule the exam when you don't have your period. · Don't use douches, tampons, or vaginal medicines, sprays, or powders for 24 hours before your exam. 
· Don't have sex for 24 hours before your exam. 
Other times, women have this kind of exam at any time of the month. This is because they have pelvic pain, bleeding, or discharge. Or they may have another pelvic problem. Before your exam, it's important to share some information with your doctor. For example, if you are a survivor of rape or sexual abuse, you can talk about any concerns you may have. Your doctor will also want to know if you are pregnant or use birth control. And he or she will want to hear about any problems, surgeries, or procedures you have had in your pelvic area. You will also need to tell your doctor when your last period was. Follow-up care is a key part of your treatment and safety. Be sure to make and go to all appointments, and call your doctor if you are having problems. It's also a good idea to know your test results and keep a list of the medicines you take. How is a pelvic exam done? · During a pelvic exam, you will: ¨ Take off your clothes below the waist. You will get a paper or cloth cover to put over the lower half of your body. Eliecer Ge on your back on an exam table. Your feet will be raised above you. Stirrups will support your feet. · The doctor will: ¨ Ask you to relax your knees. Your knees need to lean out, toward the walls. ¨ Check the opening of your vagina for sores or swelling. ¨ Gently put a tool called a speculum into your vagina. It opens the vagina a little bit. You will feel some pressure.  But if you are relaxed, it will not hurt. It lets your doctor see inside the vagina. ¨ Use a small brush, spatula, or swab to get a sample of cells, if you are having a Pap test or culture. The doctor then removes the speculum. ¨ Put on gloves and put one or two fingers of one hand into your vagina. The other hand goes on your lower belly. This lets your doctor feel your pelvic organs. You will probably feel some pressure. Try to stay relaxed. ¨ Put one gloved finger into your rectum and one into your vagina, if needed. This can also help check your pelvic organs. This exam takes about 10 minutes. At the end, you will get a washcloth or tissue to clean your vaginal area. It's normal to have some discharge after this exam. You can then get dressed. Some test results may be ready right away. But results from a culture or a Pap test may take several days or a few weeks. Why should you have a pelvic exam? 
· You want to have recommended screening tests. This includes a Pap test. 
· You think you have a vaginal infection. Signs include itching, burning, or unusual discharge. · You might have been exposed to a sexually transmitted infection (STI), such as chlamydia or herpes. · You have vaginal bleeding that is not part of your normal menstrual period. · You have pain in your belly or pelvis. · You have been sexually assaulted. A pelvic exam lets your doctor collect evidence and check for STIs. · You are pregnant. · You are having trouble getting pregnant. What are the risks of a pelvic exam? 
There are no risks from a pelvic exam. 
When should you call for help? Watch closely for changes in your health, and be sure to contact your doctor if you have any problems. Where can you learn more? Go to http://tanisha-verito.info/. Enter J038 in the search box to learn more about \"Pelvic Exam: Care Instructions. \" Current as of: October 13, 2016 Content Version: 11.4 © 5467-4892 Healthwise, [employer]. Care instructions adapted under license by [employer] (which disclaims liability or warranty for this information). If you have questions about a medical condition or this instruction, always ask your healthcare professional. [address] any warranty or liability for your use of this information. Learning About Benefits From Quitting Smoking How does quitting smoking make you healthier? If you're thinking about quitting smoking, you may have a few reasons to be smoke-free. Your health may be one of them. · When you quit smoking, you lower your risks for cancer, lung disease, heart attack, stroke, blood vessel disease, and blindness from macular degeneration. · When you're smoke-free, you get sick less often, and you heal faster. You are less likely to get colds, flu, bronchitis, and pneumonia. · As a nonsmoker, you may find that your mood is better and you are less stressed. When and how will you feel healthier? Quitting has real health benefits that start from day 1 of being smoke-free. And the longer you stay smoke-free, the healthier you get and the better you feel. The first hours · After just 20 minutes, your blood pressure and heart rate go down. That means there's less stress on your heart and blood vessels. · Within 12 hours, the level of carbon monoxide in your blood drops back to normal. That makes room for more oxygen. With more oxygen in your body, you may notice that you have more energy than when you smoked. After 2 weeks · Your lungs start to work better. · Your risk of heart attack starts to drop. After 1 month · When your lungs are clear, you cough less and breathe deeper, so it's easier to be active. · Your sense of taste and smell return. That means you can enjoy food more than you have since you started smoking. Over the years · After 1 year, your risk of heart disease is half what it would be if you kept smoking. · After 5 years, your risk of stroke starts to shrink. Within a few years after that, it's about the same as if you'd never smoked. · After 10 years, your risk of dying from lung cancer is cut by about half. And your risk for many other types of cancer is lower too. How would quitting help others in your life? When you quit smoking, you improve the health of everyone who now breathes in your smoke. · Their heart, lung, and cancer risks drop, much like yours. · They are sick less. For babies and small children, living smoke-free means they're less likely to have ear infections, pneumonia, and bronchitis. · If you're a woman who is or will be pregnant someday, quitting smoking means a healthier . · Children who are close to you are less likely to become adult smokers. Where can you learn more? Go to http://tanishaLoveItverito.info/. Enter [id number] [id number] [id number] in the search box to learn more about \"Learning About Benefits From Quitting Smoking. \" Current as of: 2017 Content Version: 11.4 © 1516-7460 [employer]. Care instructions adapted under license by [employer] (which disclaims liability or warranty for this information). If you have questions about a medical condition or this instruction, always ask your healthcare professional. [place] [address] any warranty or liability for your use of this information. Stopping Smoking: Care Instructions Your Care Instructions Cigarette smokers crave the nicotine in cigarettes. Giving it up is much harder than simply changing a habit. Your body has to stop craving the nicotine. It is hard to quit, but you can do it. There are many tools that people use to quit smoking. You may find that combining tools works best for you. There are several steps to quitting. First you get ready to quit. Then you get support to help you.  After that, you learn new skills and behaviors to become a nonsmoker. For many people, a necessary step is getting and using medicine. Your doctor will help you set up the plan that best meets your needs. You may want to attend a smoking cessation program to help you quit smoking. When you choose a program, look for one that has proven success. Ask your doctor for ideas. You will greatly increase your chances of success if you take medicine as well as get counseling or join a cessation program. 
Some of the changes you feel when you first quit tobacco are uncomfortable. Your body will miss the nicotine at first, and you may feel short-tempered and grumpy. You may have trouble sleeping or concentrating. Medicine can help you deal with these symptoms. You may struggle with changing your smoking habits and rituals. The last step is the tricky one: Be prepared for the smoking urge to continue for a time. This is a lot to deal with, but keep at it. You will feel better. Follow-up care is a key part of your treatment and safety. Be sure to make and go to all appointments, and call your doctor if you are having problems. It's also a good idea to know your test results and keep a list of the medicines you take. How can you care for yourself at home? · Ask your family, friends, and coworkers for support. You have a better chance of quitting if you have help and support. · Join a support group, such as Nicotine Anonymous, for people who are trying to quit smoking. · Consider signing up for a smoking cessation program, such as the American Lung Association's Freedom from Smoking program. 
· Set a quit date. Pick your date carefully so that it is not right in the middle of a big deadline or stressful time. Once you quit, do not even take a puff. Get rid of all ashtrays and lighters after your last cigarette. Clean your house and your clothes so that they do not smell of smoke. · Learn how to be a nonsmoker.  Think about ways you can avoid those things that make you reach for a cigarette. ¨ Avoid situations that put you at greatest risk for smoking. For some people, it is hard to have a drink with friends without smoking. For others, they might skip a coffee break with coworkers who smoke. ¨ Change your daily routine. Take a different route to work or eat a meal in a different place. · Cut down on stress. Calm yourself or release tension by doing an activity you enjoy, such as reading a book, taking a hot bath, or gardening. · Talk to your doctor or pharmacist about nicotine replacement therapy, which replaces the nicotine in your body. You still get nicotine but you do not use tobacco. Nicotine replacement products help you slowly reduce the amount of nicotine you need. These products come in several forms, many of them available over-the-counter: ¨ Nicotine patches ¨ Nicotine gum and lozenges ¨ Nicotine inhaler · Ask your doctor about bupropion (Wellbutrin) or varenicline (Chantix), which are prescription medicines. They do not contain nicotine. They help you by reducing withdrawal symptoms, such as stress and anxiety. · Some people find hypnosis, acupuncture, and massage helpful for ending the smoking habit. · Eat a healthy diet and get regular exercise. Having healthy habits will help your body move past its craving for nicotine. · Be prepared to keep trying. Most people are not successful the first few times they try to quit. Do not get mad at yourself if you smoke again. Make a list of things you learned and think about when you want to try again, such as next week, next month, or next year. Where can you learn more? Go to http://tanisha-verito.info/. Enter R377 in the search box to learn more about \"Stopping Smoking: Care Instructions. \" Current as of: March 20, 2017 Content Version: 11.4 © 7176-3571 Healthwise[employer].  Care instructions adapted under license by [address] (which disclaims liability or warranty for this information). If you have questions about a medical condition or this instruction, always ask your healthcare professional. [address] any warranty or liability for your use of this information. Deciding About Using Medicines To Quit Smoking Deciding About Using Medicines To Quit Smoking What are the medicines you can use? Your doctor may prescribe varenicline (Chantix) or bupropion (Zyban). These medicines can help you cope with cravings for tobacco. They are pills that don't contain nicotine. You also can use nicotine replacement products. These do contain nicotine. There are many types. · Gum and lozenges slowly release nicotine into your mouth. · Patches stick to your skin. They slowly release nicotine into your bloodstream. 
· An inhaler has a alcantar that contains nicotine. You breathe in a puff of nicotine vapor through your mouth and throat. · Nasal spray releases a mist that contains nicotine. What are key points about this decision? · Using medicines can double your chances of quitting smoking. They can ease cravings and withdrawal symptoms. · Getting counseling along with using medicine can raise your chances of quitting even more. · If you smoke fewer than 5 cigarettes a day, you may not need medicines to help you quit smoking. · These medicines have less nicotine than cigarettes. And by itself, nicotine is not nearly as harmful as smoking. The tars, carbon monoxide, and other toxic chemicals in tobacco cause the harmful effects. · The side effects of nicotine replacement products depend on the type of product. For example, a patch can make your skin red and itchy. Medicines in pill form can make you sick to your stomach. They can also cause dry mouth and trouble sleeping. For most people, the side effects are not bad enough to make them stop using the products. Why might you choose to use medicines to quit smoking? · You have tried on your own to stop smoking, but you were not able to stop. · You smoke more than 5 cigarettes a day. · You want to increase your chances of quitting smoking. · You want to reduce your cravings and withdrawal symptoms. · You feel the benefits of medicine outweigh the side effects. Why might you choose not to use medicine? · You want to try quitting on your own by stopping all at once (\"cold turkey\"). · You want to cut back slowly on the number of cigarettes you smoke. · You smoke fewer than 5 cigarettes a day. · You do not like using medicine. · You feel the side effects of medicines outweigh the benefits. · You are worried about the cost of medicines. Your decision Thinking about the facts and your feelings can help you make a decision that is right for you. Be sure you understand the benefits and risks of your options, and think about what else you need to do before you make the decision. Where can you learn more? Go to http://tanisha-verito.info/. Enter V392 in the search box to learn more about \"Deciding About Using Medicines To Quit Smoking. \" Current as of: March 20, 2017 Content Version: 11.4 © 4076-2761 [employer]. Care instructions adapted under license by [employer] (which disclaims liability or warranty for this information). If you have questions about a medical condition or this instruction, always ask your healthcare professional. [place] [address] any warranty or liability for your use of this information. Kegel Exercises: Care Instructions Your Care Instructions Kegel exercises strengthen muscles around the bladder. These muscles control the flow of urine. Kegel exercises are sometime called \"pelvic floor\" exercises. They can help prevent urine leakage and keep the pelvic organs in place. A woman who just had a baby might want to try Kegel exercises. They can strengthen pelvic muscles that have been weakened by pregnancy and childbirth. A man or woman may use Kegel exercises to treat urine leakage. You do Kegel exercises by tightening the muscles you use when you urinate. You will likely need to do these exercises for several weeks to get better. Follow-up care is a key part of your treatment and safety. Be sure to make and go to all appointments, and call your doctor if you are having problems. It's also a good idea to know your test results and keep a list of the medicines you take. How can you care for yourself at home? · Do Kegel exercises. ¨ Find the muscles you need to strengthen. To do this, tighten the muscles that stop your urine while you are going to the bathroom. These are the same muscles you squeeze during Kegel exercises. ¨ Squeeze the muscles as hard as you can. Your belly and thighs should not move. ¨ Hold the squeeze for 3 seconds. Then relax for 3 seconds. ¨ Start with 3 seconds, and then add 1 second each week until you are able to squeeze for 10 seconds. ¨ Repeat the exercise 10 to 15 times for each session. Do three or more sessions each day. · You can check to see if you are using the right muscles. Place a finger in your vagina and squeeze around it. You are doing them right when you feel pressure around your finger. Your doctor may also suggest that you put special weights in your vagina while you do the exercises. · Do not smoke. It can irritate the bladder. If you need help quitting, talk to your doctor about stop-smoking programs and medicines. These can increase your chances of quitting for good. Where can you learn more? Go to http://tanisha-verito.info/. Enter L190 in the search box to learn more about \"Kegel Exercises: Care Instructions. \" Current as of: May 12, 2017 Content Version: 11.4 © 2040-7331 Healthwise, Incorporated. Care instructions adapted under license by [employer] (which disclaims liability or warranty for this information). If you have questions about a medical condition or this instruction, always ask your healthcare professional. [address] any warranty or liability for your use of this information. Introducing [facility] & HEALTH SERVICES! [facility] introduces Soricimed patient portal. Now you can access parts of your medical record, email your doctor's office, and request medication refills online. 1. In your internet browser, go to [email or web address] 2. Click on the First Time User? Click Here link in the Sign In box. You will see the New Member Sign Up page. 3. Enter your Soricimed Access Code exactly as it appears below. You will not need to use this code after youve completed the sign-up process. If you do not sign up before the expiration date, you must request a new code. · Soricimed Access Code: [email or web address] Expires: 6/15/2018 10:33 PM 
 
4. Enter the last four digits of your Social Security Number (xxxx) and Date of Birth (mm/dd/yyyy) as indicated and click Submit. You will be taken to the next sign-up page. 5. Create a Soricimed ID. This will be your Soricimed login ID and cannot be changed, so think of one that is secure and easy to remember. 6. Create a Soricimed password. You can change your password at any time. 7. Enter your Password Reset Question and Answer. This can be used at a later time if you forget your password. 8. Enter your e-mail address. You will receive e-mail notification when new information is available in [address] 9. Click Sign Up. You can now view and download portions of your medical record. 10. Click the Download Summary menu link to download a portable copy of your medical information.  
 
If you have questions, please visit the Frequently Asked Questions section of the V[employer]. Remember, [employer] is NOT to be used for urgent needs. For medical emergencies, dial 911. Now available from your iPhone and Android! Please provide this summary of care documentation to your next provider. Your primary care clinician is listed as Phys Other. If you have any questions after today's visit, please call [phone].

## 2018-04-17 NOTE — PROGRESS NOTES
Annual exam ages 21-44    Michelle Leal is a No obstetric history on file. ,  42 y.o. female [address] Patient's last menstrual period was 04/08/2018. .    She presents for her annual checkup. She is having patient with vaginal discharge. Has BV about twice/year. Has linked certain soaps to having the discharge. +odor. Has recently been trying to lose weight. Has just lost 10 lbs. Has been trying to quit smoking. Is down to 1 black and mild/day. Patient with intermittent leakage of urine with coughing or laughing or exercise. With regard to the Gardasil vaccine, she is older than the FDA approved age to receive it. Menstrual status:    Periods last 5 days. Around 3.5-4 weeks between periods. They are very heavy. Not painful. Has to use tampons and pad to make sure that she does not bleed through. Sometimes has large clots. No bleeding between periods. She reports no premenstrual symptoms. Contraception:    The current method of family planning is tubal ligation. Sexual history:     She  reports that she does not currently engage in sexual activity. She reports using the following method of birth control/protection: Surgical..    Medical conditions:    Since her last annual GYN exam about one year ago, she has not the following changes in her health history: none. Pap and Mammogram History:    Last pap smear about a year ago. Hx of abnormal pap smear with cryo around 2011 or so. The patient with mammogram the beginning of last year. fibrocystic breasts. no family hx of breast cancer. .    Breast Cancer History/Substance Abuse: negative    Past Medical History:   Diagnosis Date    Hypertension      Past Surgical History:   Procedure Laterality Date    HX TUBAL LIGATION  2000    LAP,TUBAL CAUTERY  2000       Current Outpatient Prescriptions   Medication Sig Dispense Refill    hydroCHLOROthiazide (HYDRODIURIL) 25 mg tablet       amLODIPine (NORVASC) 10 mg tablet Take  by mouth daily. Pt takes 1/2 tablet. Allergies: Review of patient's allergies indicates no known allergies. Tobacco History:  reports that she has been smoking. She has been smoking about 0.25 packs per day. She has never used smokeless tobacco.  Alcohol Abuse:  reports that she drinks alcohol. Drug Abuse:  reports that she does not use illicit drugs.     Family Medical/Cancer History:   Family History   Problem Relation Age of Onset    Hypertension Mother     Hypertension Sister         Review of Systems - History obtained from the patient  Constitutional: negative for weight loss, fever, night sweats  HEENT: negative for hearing loss, earache, congestion, snoring, sorethroat  CV: negative for chest pain, palpitations, edema  Resp: negative for cough, shortness of breath, wheezing  GI: negative for change in bowel habits, abdominal pain, black or bloody stools  : negative for frequency, dysuria, hematuria, vaginal discharge  MSK: negative for back pain, joint pain, muscle pain  Breast: negative for breast lumps, nipple discharge, galactorrhea  Skin :negative for itching, rash, hives  Neuro: negative for dizziness, headache, confusion, weakness  Psych: negative for anxiety, depression, change in mood  Heme/lymph: negative for bleeding, bruising, pallor    Physical Exam    Visit Vitals    Resp 18    Ht 5' 11\" (1.803 m)    Wt 267 lb 12.8 oz (121.5 kg)    LMP 04/08/2018    BMI 37.35 kg/m2       Constitutional  · Appearance: well-nourished, well developed, alert, in no acute distress    HENT  · Head and Face: appears normal    Neck  · Inspection/Palpation: normal appearance, no masses or tenderness  · Lymph Nodes: no lymphadenopathy present  · Thyroid: gland size normal, nontender, no nodules or masses present on palpation    Chest  · Respiratory Effort: breathing unlabored  · Auscultation: normal breath sounds    Cardiovascular  · Heart:  · Auscultation: regular rate and rhythm without murmur    Breasts  · Inspection of Breasts: breasts symmetrical, no skin changes, no discharge present, nipple appearance normal, no skin retraction present  · Palpation of Breasts and Axillae: no masses present on palpation, no breast tenderness  · Axillary Lymph Nodes: no lymphadenopathy present    Gastrointestinal  · Abdominal Examination: abdomen non-tender to palpation, normal bowel sounds, no masses present  · Liver and spleen: no hepatomegaly present, spleen not palpable  · Hernias: no hernias identified    Genitourinary  · External Genitalia: normal appearance for age, no discharge present, no tenderness present, no inflammatory lesions present, no masses present, no atrophy present  · Vagina: normal vaginal vault without central or paravaginal defects, no discharge present, no inflammatory lesions present, no masses present  · Bladder: non-tender to palpation  · Urethra: appears normal  · Cervix: normal   · Uterus: normal size, shape and consistency  · Adnexa: no adnexal tenderness present, no adnexal masses present  · Perineum: perineum within normal limits, no evidence of trauma, no rashes or skin lesions present  · Anus: anus within normal limits, no hemorrhoids present  · Inguinal Lymph Nodes: no lymphadenopathy present    Skin  · General Inspection: no rash, no lesions identified    Neurologic/Psychiatric  · Mental Status:  · Orientation: grossly oriented to person, place and time  · Mood and Affect: mood normal, affect appropriate    . Assessment:  Routine gynecologic examination  Her current medical status is satisfactory with no evidence of significant gynecologic issues. Plan:  - pap smear today  - mammogram due to hx of cysts in breasts  - Oneswab for vaginal discharge  - Discussed smoking cessation. Is going to try quit. If she decides that she would like to start the patch, will call. - Discussed Mirena vs endometrial ablation for menstrual control. Desires Mirena.   Will get paperwork and place after her vacation in June. Prescription for Provera given to make sure patient does not have period on her vacation.  - Patient given info on Kegel exercises for incontinence. If this does not work will consider surgery    Counseled re: diet, exercise, healthy lifestyle  Return for yearly wellness visits  Gardisil counseling provided  Pt counseled regarding co-testing for high risk HPV with pap    Return to clinic in 2-3 months    Spent greater than 45 minutes with patient, over 50% of which was spent counselling.

## 2018-04-20 LAB
A VAGINAE DNA VAG QL NAA+PROBE: ABNORMAL SCORE
BVAB2 DNA VAG QL NAA+PROBE: ABNORMAL SCORE
C ALBICANS DNA VAG QL NAA+PROBE: NEGATIVE
C GLABRATA DNA VAG QL NAA+PROBE: NEGATIVE
MEGA1 DNA VAG QL NAA+PROBE: ABNORMAL SCORE
T VAGINALIS RRNA SPEC QL NAA+PROBE: NEGATIVE

## 2018-04-23 RX ORDER — METRONIDAZOLE 500 MG/1
500 TABLET ORAL 2 TIMES DAILY
Qty: 14 TAB | Refills: 0 | Status: SHIPPED | OUTPATIENT
Start: 2018-04-23 | End: 2018-04-30

## 2018-04-24 LAB
CYTOLOGIST CVX/VAG CYTO: NORMAL
CYTOLOGY CVX/VAG DOC THIN PREP: NORMAL
DX ICD CODE: NORMAL
HPV I/H RISK 1 DNA CVX QL PROBE+SIG AMP: NEGATIVE
Lab: NORMAL
OTHER STN SPEC: NORMAL
PATH REPORT.FINAL DX SPEC: NORMAL
STAT OF ADQ CVX/VAG CYTO-IMP: NORMAL

## 2018-04-24 NOTE — PROGRESS NOTES
Please let patient know that she has BV. Prescription for Flagyl BID for 7 days was sent to her preferred pharmacy. Thank you.

## 2018-04-25 ENCOUNTER — TELEPHONE (OUTPATIENT)
Dept: OBGYN CLINIC | Age: 38
End: 2018-04-25

## 2018-04-25 NOTE — TELEPHONE ENCOUNTER
Pt returned call, information given with understanding. Pt also informed normal Pap, will get letter.

## 2018-04-25 NOTE — TELEPHONE ENCOUNTER
----- Message from Avel Hahn MD sent at 4/23/2018  9:50 PM EDT -----  Please let patient know that she has BV. Prescription for Flagyl BID for 7 days was sent to her preferred pharmacy. Thank you.

## 2018-05-11 NOTE — TELEPHONE ENCOUNTER
Pt called stating that she lost her flagyl prescription, she is requesting a new prescription to be sent to her pharmacy.

## 2018-05-14 RX ORDER — METRONIDAZOLE 500 MG/1
500 TABLET ORAL 2 TIMES DAILY
Qty: 14 TAB | Refills: 0 | Status: SHIPPED | OUTPATIENT
Start: 2018-05-14 | End: 2018-05-20

## 2018-05-15 ENCOUNTER — OFFICE VISIT (OUTPATIENT)
Dept: INTERNAL MEDICINE CLINIC | Age: 38
End: 2018-05-15

## 2018-05-15 ENCOUNTER — TELEPHONE (OUTPATIENT)
Dept: INTERNAL MEDICINE CLINIC | Age: 38
End: 2018-05-15

## 2018-05-15 VITALS
SYSTOLIC BLOOD PRESSURE: 130 MMHG | RESPIRATION RATE: 19 BRPM | BODY MASS INDEX: 37.66 KG/M2 | WEIGHT: 269 LBS | HEART RATE: 63 BPM | DIASTOLIC BLOOD PRESSURE: 74 MMHG | HEIGHT: 71 IN | OXYGEN SATURATION: 100 %

## 2018-05-15 DIAGNOSIS — E66.9 OBESITY (BMI 35.0-39.9 WITHOUT COMORBIDITY): ICD-10-CM

## 2018-05-15 DIAGNOSIS — K29.70 GASTRITIS WITHOUT BLEEDING, UNSPECIFIED CHRONICITY, UNSPECIFIED GASTRITIS TYPE: ICD-10-CM

## 2018-05-15 DIAGNOSIS — I10 ESSENTIAL HYPERTENSION: ICD-10-CM

## 2018-05-15 DIAGNOSIS — R10.84 GENERALIZED ABDOMINAL PAIN: Primary | ICD-10-CM

## 2018-05-15 DIAGNOSIS — G47.00 INSOMNIA, UNSPECIFIED TYPE: ICD-10-CM

## 2018-05-15 PROBLEM — R10.9 ABDOMINAL PAIN: Status: ACTIVE | Noted: 2018-05-15

## 2018-05-15 PROBLEM — R10.9 ABDOMINAL PAIN: Status: RESOLVED | Noted: 2018-05-15 | Resolved: 2018-05-15

## 2018-05-15 PROBLEM — E66.01 SEVERE OBESITY (BMI 35.0-39.9) WITH COMORBIDITY (HCC): Status: ACTIVE | Noted: 2018-05-15

## 2018-05-15 RX ORDER — TRAZODONE HYDROCHLORIDE 50 MG/1
50 TABLET ORAL
Qty: 30 TAB | Refills: 1 | Status: SHIPPED | OUTPATIENT
Start: 2018-05-15 | End: 2018-06-06 | Stop reason: SDUPTHER

## 2018-05-15 RX ORDER — PANTOPRAZOLE SODIUM 40 MG/1
40 TABLET, DELAYED RELEASE ORAL DAILY
Qty: 30 TAB | Refills: 1 | Status: SHIPPED | OUTPATIENT
Start: 2018-05-15 | End: 2018-05-15 | Stop reason: CLARIF

## 2018-05-15 RX ORDER — PHENOL/SODIUM PHENOLATE
20 AEROSOL, SPRAY (ML) MUCOUS MEMBRANE DAILY
Qty: 30 TAB | Refills: 5 | Status: SHIPPED | OUTPATIENT
Start: 2018-05-15 | End: 2018-05-21 | Stop reason: SDUPTHER

## 2018-05-15 NOTE — MR AVS SNAPSHOT
[address] [facility] [address] [place] [address] 
[phone] Patient: Jasiel Flores MRN: [id number] :1980 Visit Information Date & Time Provider Department Dept. Phone Encounter #  
 5/15/2018  9:00 AM Dana Juaquin, [address] [facility] Internal Medicine [phone] [id number] Follow-up Instructions Return in about 2 weeks (around 2018). Your Appointments 7/3/2018 10:00 AM  
ESTABLISHED PATIENT with Clint Jordan MD  
[address],Second Floor East Wing AT [facility] ([address]) Appt Note: 2 to 3 month follow up [place] [address] [facility] [address]  
[phone]  
  
   
 [place] [address] [place] [address] Upcoming Health Maintenance Date Due Pneumococcal 19-64 Medium Risk (1 of 1 - PPSV23) 1997 DTaP/Tdap/Td series (1 - Tdap) 1999 Influenza Age 5 to Adult 2018 PAP AKA CERVICAL CYTOLOGY 2021 Allergies as of 5/15/2018  Review Complete On: 5/15/2018 By: Dana Reza, DO No Known Allergies Current Immunizations  Never Reviewed No immunizations on file. Not reviewed this visit You Were Diagnosed With   
  
 Codes Comments Generalized abdominal pain    -  Primary ICD-10-CM: R10.84 ICD-9-CM: 789.07 Gastritis without bleeding, unspecified chronicity, unspecified gastritis type     ICD-10-CM: K29.70 ICD-9-CM: 535.50 Essential hypertension     ICD-10-CM: I10 
ICD-9-CM: 401.9 Obesity (BMI 35.0-39.9 without comorbidity)     ICD-10-CM: I71.4 ICD-9-CM: 278.00 Insomnia, unspecified type     ICD-10-CM: G47.00 ICD-9-CM: 780.52 Vitals BP Pulse Resp Height(growth percentile) Weight(growth percentile) SpO2  
 130/74 (BP 1 Location: Left arm, BP Patient Position: Sitting) 63 19 5' 11\" (1.803 m) 269 lb (122 kg) 100% BMI Smoking Status 37.52 kg/m2 Current Every Day Smoker Vitals History BMI and BSA Data Body Mass Index Body Surface Area  
 37.52 kg/m 2 2.47 m 2 Preferred Pharmacy Pharmacy Name Phone [facility] [phone] Your Updated Medication List  
  
   
This list is accurate as of 5/15/18  9:03 AM.  Always use your most recent med list. amLODIPine 10 mg tablet Commonly known as:  Wesley Raswapnil Take  by mouth daily. Pt takes 1/2 tablet. hydroCHLOROthiazide 25 mg tablet Commonly known as:  HYDRODIURIL  
  
 metroNIDAZOLE 500 mg tablet Commonly known as:  FLAGYL Take 1 Tab by mouth two (2) times a day for 7 days. pantoprazole 40 mg tablet Commonly known as:  PROTONIX Take 1 Tab by mouth daily. traZODone 50 mg tablet Commonly known as:  Aleida Garciaelbach Take 1 Tab by mouth nightly. Prescriptions Sent to Pharmacy Refills  
 pantoprazole (PROTONIX) 40 mg tablet 1 Sig: Take 1 Tab by mouth daily. Class: Normal  
 Pharmacy: [facility] [address] [facility] [address] [place] [address], [address] [place] [address] Ph #: [phone] Route: Oral  
 traZODone (DESYREL) 50 mg tablet 1 Sig: Take 1 Tab by mouth nightly. Class: Normal  
 Pharmacy: [facility] [address] [facility] [address] [place] [address], [address] [place] [address] Ph #: [phone] Route: Oral  
  
We Performed the Following CBC W/O DIFF [05298 CPT(R)] H. PYLORI ABS, IGG, IGA, IGM [id number] CPT(R)] LIPID PANEL [43551 CPT(R)] METABOLIC PANEL, COMPREHENSIVE [75059 CPT(R)] TSH 3RD GENERATION [64460 CPT(R)] Follow-up Instructions Return in about 2 weeks (around 5/29/2018). To-Do List   
 05/22/2018 2:00 PM  
  Appointment with [facility] 1 at [facility] ([phone]) Shower or bathe using soap and water.   Do not use deodorant, powder, perfumes, or lotion the day of your exam.  If your prior mammograms were not performed at [facility] please bring films with you or forward prior images 2 days before your procedure. Check in at registration 15min before your appointment time unless you were instructed to do otherwise. A script is not necessary, but if you have one, please bring it on the day of the mammogram or have it faxed to the department. [facility] [phone] [facility]  [phone] [facility] 19 [facility]  [phone] [facility] [phone] [facility] 9562 [facility] Mana Mojica [phone] Patient Instructions Gastritis: Care Instructions Your Care Instructions Gastritis is a sore and upset stomach. It happens when something irritates the stomach lining. Many things can cause it. These include an infection such as the flu or something you ate or drank. Medicines or a sore on the lining of the stomach (ulcer) also can cause it. Your belly may bloat and ache. You may belch, vomit, and feel sick to your stomach. You should be able to relieve the problem by taking medicine. And it may help to change your diet. If gastritis lasts, your doctor may prescribe medicine. Follow-up care is a key part of your treatment and safety. Be sure to make and go to all appointments, and call your doctor if you are having problems. It's also a good idea to know your test results and keep a list of the medicines you take. How can you care for yourself at home? · If your doctor prescribed antibiotics, take them as directed. Do not stop taking them just because you feel better. You need to take the full course of antibiotics. · Be safe with medicines. If your doctor prescribed medicine to decrease stomach acid, take it as directed. Call your doctor if you think you are having a problem with your medicine. · Do not take any other medicine, including over-the-counter pain relievers, without talking to your doctor first. 
· If your doctor recommends over-the-counter medicine to reduce stomach acid, such as Pepcid AC, Prilosec, Tagamet HB, or Zantac 75, follow the directions on the label. · Drink plenty of fluids (enough so that your urine is light yellow or clear like water) to prevent dehydration. Choose water and other caffeine-free clear liquids. If you have kidney, heart, or liver disease and have to limit fluids, talk with your doctor before you increase the amount of fluids you drink. · Limit how much alcohol you drink. · Avoid coffee, tea, cola drinks, chocolate, and other foods with caffeine. They increase stomach acid. When should you call for help? Call 911 anytime you think you may need emergency care. For example, call if: 
? · You vomit blood or what looks like coffee grounds. ? · You pass maroon or very bloody stools. ?Call your doctor now or seek immediate medical care if: 
? · You start breathing fast and have not produced urine in the last 8 hours. ? · You cannot keep fluids down. ? Watch closely for changes in your health, and be sure to contact your doctor if: 
? · You do not get better as expected. Where can you learn more? Go to http://tanisha-verito.info/. Enter [id number] in the search box to learn more about \"Gastritis: Care Instructions. \" Current as of: May 12, 2017 Content Version: 11.4 © 0344-2611 Fieldbook. Care instructions adapted under license by [employer] (which disclaims liability or warranty for this information). If you have questions about a medical condition or this instruction, always ask your healthcare professional. [place] [address] any warranty or liability for your use of this information. Introducing [facility] & HEALTH SERVICES! Dear Jagdeep Arthur: 
Thank you for requesting a [employer] account. Our records indicate that you already have an active [employer] account. You can access your account anytime at [email or web address] ([email or web address] Did you know that you can access your hospital and ER discharge instructions at any time in [employer]?   You can also review all of your test results from your hospital stay or ER visit. Additional Information If you have questions, please visit the Frequently Asked Questions section of the [employer] website at [email or web address] Remember, [employer] is NOT to be used for urgent needs. For medical emergencies, dial 911. Now available from your iPhone and Android! Please provide this summary of care documentation to your next provider. Your primary care clinician is listed as Marcelina Gutierrez. If you have any questions after today's visit, please call [phone].

## 2018-05-15 NOTE — PATIENT INSTRUCTIONS
Gastritis: Care Instructions  Your Care Instructions    Gastritis is a sore and upset stomach. It happens when something irritates the stomach lining. Many things can cause it. These include an infection such as the flu or something you ate or drank. Medicines or a sore on the lining of the stomach (ulcer) also can cause it. Your belly may bloat and ache. You may belch, vomit, and feel sick to your stomach. You should be able to relieve the problem by taking medicine. And it may help to change your diet. If gastritis lasts, your doctor may prescribe medicine. Follow-up care is a key part of your treatment and safety. Be sure to make and go to all appointments, and call your doctor if you are having problems. It's also a good idea to know your test results and keep a list of the medicines you take. How can you care for yourself at home? · If your doctor prescribed antibiotics, take them as directed. Do not stop taking them just because you feel better. You need to take the full course of antibiotics. · Be safe with medicines. If your doctor prescribed medicine to decrease stomach acid, take it as directed. Call your doctor if you think you are having a problem with your medicine. · Do not take any other medicine, including over-the-counter pain relievers, without talking to your doctor first.  · If your doctor recommends over-the-counter medicine to reduce stomach acid, such as Pepcid AC, Prilosec, Tagamet HB, or Zantac 75, follow the directions on the label. · Drink plenty of fluids (enough so that your urine is light yellow or clear like water) to prevent dehydration. Choose water and other caffeine-free clear liquids. If you have kidney, heart, or liver disease and have to limit fluids, talk with your doctor before you increase the amount of fluids you drink. · Limit how much alcohol you drink. · Avoid coffee, tea, cola drinks, chocolate, and other foods with caffeine.  They increase stomach acid.  When should you call for help? Call 911 anytime you think you may need emergency care. For example, call if:  ? · You vomit blood or what looks like coffee grounds. ? · You pass maroon or very bloody stools. ?Call your doctor now or seek immediate medical care if:  ? · You start breathing fast and have not produced urine in the last 8 hours. ? · You cannot keep fluids down. ? Watch closely for changes in your health, and be sure to contact your doctor if:  ? · You do not get better as expected. Where can you learn more? Go to http://tanisha-verito.info/. Enter [id number] in the search box to learn more about \"Gastritis: Care Instructions. \"  Current as of: May 12, 2017  Content Version: 11.4  © 2498-5447 Healthwise, Incorporated. Care instructions adapted under license by [employer] (which disclaims liability or warranty for this information). If you have questions about a medical condition or this instruction, always ask your healthcare professional. [address] any warranty or liability for your use of this information.

## 2018-05-15 NOTE — TELEPHONE ENCOUNTER
Pantoprazole wasn't covered. Esomeprazole wasn't covered. Omeprazole 20mg every day for 1 month was 7.00.   Omeprazole was called in for pt per order from provider

## 2018-05-15 NOTE — PROGRESS NOTES
HISTORY OF PRESENT ILLNESS  Michelle Leal is a 42 y.o. female. New patient who comes in to [facility] care. History of HTN, obesity, gastritis/H. pylori which was treated over 10 years ago. Reports having a few days of increased abdominal pain and burning similar to previous episodes. Uses Aleve PM occasionally. Acidic food makes symptoms worse. Denies smoking. Drinks socially. Has been on BP medications for long time. She is obese. Trying to be active to lose weight. Trying to watch her diet. Needs labs. I reviewed old records that she has brought in. EGD in 2007 showed gastritis and small hiatal hernia. Has 2 grown kids. FH of HTN and throat cancer. No other complaints. Hypertension    Pertinent negatives include no chest pain, no malaise/fatigue, no blurred vision, no headaches, no dizziness, no shortness of breath, no nausea and no vomiting. Abdominal Pain   Associated symptoms include abdominal pain. Pertinent negatives include no chest pain, no headaches and no shortness of breath. Weight Management   Associated symptoms include abdominal pain. Pertinent negatives include no chest pain, no headaches and no shortness of breath. New Patient   Associated symptoms include abdominal pain. Pertinent negatives include no chest pain, no headaches and no shortness of breath. Review of Systems   Constitutional: Positive for weight gain. Negative for fever, malaise/fatigue and weight loss. HENT: Negative for congestion. Eyes: Negative for blurred vision. Respiratory: Negative for cough and shortness of breath. Cardiovascular: Negative for chest pain and leg swelling. Gastrointestinal: Positive for abdominal pain. Negative for blood in stool, constipation, diarrhea, heartburn, melena, nausea and vomiting. Genitourinary: Negative for dysuria and frequency. Musculoskeletal: Negative for back pain and joint pain. Skin: Negative for rash.    Neurological: Negative for dizziness, sensory change and headaches. Psychiatric/Behavioral: Negative for depression. The patient is not nervous/anxious and does not have insomnia. All other systems reviewed and are negative. Physical Exam   Constitutional: She is oriented to person, place, and time. She appears well-developed and well-nourished. No distress. Pleasant lady  Obese   HENT:   Head: Normocephalic and atraumatic. Mouth/Throat: Oropharynx is clear and moist.   Eyes: Conjunctivae and EOM are normal. Pupils are equal, round, and reactive to light. No scleral icterus. Neck: Normal range of motion. Neck supple. No JVD present. No thyromegaly present. Cardiovascular: Normal rate, regular rhythm, normal heart sounds and intact distal pulses. No murmur heard. Pulmonary/Chest: Effort normal and breath sounds normal. No respiratory distress. She has no wheezes. She has no rales. Abdominal: Soft. Bowel sounds are normal. She exhibits no distension and no mass. There is tenderness (Upper abdomen). There is no rebound and no guarding. Musculoskeletal: She exhibits no edema or tenderness. Lymphadenopathy:     She has no cervical adenopathy. Neurological: She is alert and oriented to person, place, and time. No cranial nerve deficit. Coordination normal.   Skin: Skin is warm and dry. No rash noted. Psychiatric: She has a normal mood and affect. Her behavior is normal.   Nursing note and vitals reviewed. ASSESSMENT and PLAN  Diagnoses and all orders for this visit:    1. Generalized abdominal pain  -     H. PYLORI ABS, IGG, IGA, IGM  -     TSH 3RD GENERATION    2. Gastritis without bleeding, unspecified chronicity, unspecified gastritis type  -     H. PYLORI ABS, IGG, IGA, IGM  -     TSH 3RD GENERATION    3. Essential hypertension  -     LIPID PANEL  -     METABOLIC PANEL, COMPREHENSIVE  -     CBC W/O DIFF  -     TSH 3RD GENERATION    4. Obesity (BMI 35.0-39.9 without comorbidity)  -     TSH 3RD GENERATION    5.  Insomnia, unspecified type    Other orders  -     pantoprazole (PROTONIX) 40 mg tablet; Take 1 Tab by mouth daily. -     traZODone (DESYREL) 50 mg tablet; Take 1 Tab by mouth nightly. Follow-up Disposition:  Return in about 2 weeks (around 5/29/2018).    lab results and schedule of future lab studies reviewed with patient  reviewed diet, exercise and weight control  reviewed medications and side effects in detail  Information about gastritis do's and don'ts given  May need endoscopy if symptoms do not improve  Advised patient to avoid NSAIDs, acidic food and liquids  We will treat her for H. pylori if lab results are positive

## 2018-05-15 NOTE — PROGRESS NOTES
Health Maintenance Due   Topic Date Due    Pneumococcal 19-64 Medium Risk (1 of 1 - PPSV23) 12/06/1997    DTaP/Tdap/Td series (1 - Tdap) 12/06/1999       Chief Complaint   Patient presents with    Hypertension    Abdominal Pain     x4-5 days    Weight Management    New Patient       1. Have you been to the ER, urgent care clinic since your last visit? Hospitalized since your last visit? Yes When: 3/17/18 Where: [facility] Reason for visit: Hypertension    2. Have you seen or consulted any other health care providers outside of the [address] [place] [address] since your last visit? Include any pap smears or colon screening. No    3) Do you have an Advance Directive on file? no    4) Are you interested in receiving information on Advance Directives? NO      Patient is accompanied by self I have received verbal consent from Tammy Taylor to discuss any/all medical information while they are present in the room.

## 2018-05-16 LAB
ALBUMIN SERPL-MCNC: 4.4 G/DL (ref 3.5–5.5)
ALBUMIN/GLOB SERPL: 1.5 {RATIO} (ref 1.2–2.2)
ALP SERPL-CCNC: 72 IU/L (ref 39–117)
ALT SERPL-CCNC: 17 IU/L (ref 0–32)
AST SERPL-CCNC: 14 IU/L (ref 0–40)
BILIRUB SERPL-MCNC: 0.4 MG/DL (ref 0–1.2)
BUN SERPL-MCNC: 8 MG/DL (ref 6–20)
BUN/CREAT SERPL: 10 (ref 9–23)
CALCIUM SERPL-MCNC: 9.3 MG/DL (ref 8.7–10.2)
CHLORIDE SERPL-SCNC: 97 MMOL/L (ref 96–106)
CHOLEST SERPL-MCNC: 186 MG/DL (ref 100–199)
CO2 SERPL-SCNC: 26 MMOL/L (ref 18–29)
CREAT SERPL-MCNC: 0.82 MG/DL (ref 0.57–1)
ERYTHROCYTE [DISTWIDTH] IN BLOOD BY AUTOMATED COUNT: 13.1 % (ref 12.3–15.4)
GFR SERPLBLD CREATININE-BSD FMLA CKD-EPI: 104 ML/MIN/1.73
GFR SERPLBLD CREATININE-BSD FMLA CKD-EPI: 90 ML/MIN/1.73
GLOBULIN SER CALC-MCNC: 3 G/DL (ref 1.5–4.5)
GLUCOSE SERPL-MCNC: 85 MG/DL (ref 65–99)
H PYLORI IGA SER-ACNC: 24.2 UNITS (ref 0–8.9)
H PYLORI IGG SER IA-ACNC: 1.18 INDEX VALUE (ref 0–0.79)
H PYLORI IGM SER-ACNC: <9 UNITS (ref 0–8.9)
HCT VFR BLD AUTO: 38.6 % (ref 34–46.6)
HDLC SERPL-MCNC: 56 MG/DL
HGB BLD-MCNC: 12.5 G/DL (ref 11.1–15.9)
INTERPRETATION, 910389: NORMAL
LDLC SERPL CALC-MCNC: 114 MG/DL (ref 0–99)
MCH RBC QN AUTO: 26.4 PG (ref 26.6–33)
MCHC RBC AUTO-ENTMCNC: 32.4 G/DL (ref 31.5–35.7)
MCV RBC AUTO: 81 FL (ref 79–97)
PLATELET # BLD AUTO: 380 X10E3/UL (ref 150–379)
POTASSIUM SERPL-SCNC: 4 MMOL/L (ref 3.5–5.2)
PROT SERPL-MCNC: 7.4 G/DL (ref 6–8.5)
RBC # BLD AUTO: 4.74 X10E6/UL (ref 3.77–5.28)
SODIUM SERPL-SCNC: 139 MMOL/L (ref 134–144)
TRIGL SERPL-MCNC: 80 MG/DL (ref 0–149)
TSH SERPL DL<=0.005 MIU/L-ACNC: 1.58 UIU/ML (ref 0.45–4.5)
VLDLC SERPL CALC-MCNC: 16 MG/DL (ref 5–40)
WBC # BLD AUTO: 6 X10E3/UL (ref 3.4–10.8)

## 2018-05-20 ENCOUNTER — HOSPITAL ENCOUNTER (EMERGENCY)
Age: 38
Discharge: HOME | End: 2018-05-20
Attending: EMERGENCY MEDICINE
Payer: MEDICAID

## 2018-05-20 VITALS
SYSTOLIC BLOOD PRESSURE: 126 MMHG | HEART RATE: 68 BPM | WEIGHT: 277 LBS | RESPIRATION RATE: 14 BRPM | TEMPERATURE: 98.4 F | BODY MASS INDEX: 38.63 KG/M2 | DIASTOLIC BLOOD PRESSURE: 75 MMHG | OXYGEN SATURATION: 100 %

## 2018-05-20 DIAGNOSIS — K21.9 GASTROESOPHAGEAL REFLUX DISEASE WITHOUT ESOPHAGITIS: Primary | ICD-10-CM

## 2018-05-20 LAB
APPEARANCE UR: CLEAR
BACTERIA URNS QL MICRO: NEGATIVE /HPF
BILIRUB UR QL: NEGATIVE
COLOR UR: ABNORMAL
EPITH CASTS URNS QL MICRO: ABNORMAL /LPF
GLUCOSE UR STRIP.AUTO-MCNC: NEGATIVE MG/DL
HCG UR QL: NEGATIVE
HGB UR QL STRIP: NEGATIVE
KETONES UR QL STRIP.AUTO: ABNORMAL MG/DL
LEUKOCYTE ESTERASE UR QL STRIP.AUTO: NEGATIVE
NITRITE UR QL STRIP.AUTO: NEGATIVE
PH UR STRIP: 5.5 [PH] (ref 5–8)
PROT UR STRIP-MCNC: NEGATIVE MG/DL
RBC #/AREA URNS HPF: ABNORMAL /HPF (ref 0–5)
SP GR UR REFRACTOMETRY: >1.03 (ref 1–1.03)
UA: UC IF INDICATED,UAUC: ABNORMAL
UROBILINOGEN UR QL STRIP.AUTO: 1 EU/DL (ref 0.2–1)
WBC URNS QL MICRO: ABNORMAL /HPF (ref 0–4)

## 2018-05-20 PROCEDURE — 74011000250 HC RX REV CODE- 250: Performed by: EMERGENCY MEDICINE

## 2018-05-20 PROCEDURE — 99284 EMERGENCY DEPT VISIT MOD MDM: CPT

## 2018-05-20 PROCEDURE — 81025 URINE PREGNANCY TEST: CPT

## 2018-05-20 PROCEDURE — 74011250637 HC RX REV CODE- 250/637: Performed by: EMERGENCY MEDICINE

## 2018-05-20 PROCEDURE — 81001 URINALYSIS AUTO W/SCOPE: CPT | Performed by: EMERGENCY MEDICINE

## 2018-05-20 RX ORDER — FAMOTIDINE 20 MG/1
20 TABLET, FILM COATED ORAL 2 TIMES DAILY
Qty: 20 TAB | Refills: 0 | Status: SHIPPED | OUTPATIENT
Start: 2018-05-20 | End: 2018-08-13 | Stop reason: ALTCHOICE

## 2018-05-20 RX ORDER — ONDANSETRON 4 MG/1
4 TABLET, ORALLY DISINTEGRATING ORAL
Qty: 10 TAB | Refills: 0 | Status: SHIPPED | OUTPATIENT
Start: 2018-05-20 | End: 2018-08-13 | Stop reason: ALTCHOICE

## 2018-05-20 RX ORDER — ONDANSETRON 4 MG/1
4 TABLET, ORALLY DISINTEGRATING ORAL
Status: COMPLETED | OUTPATIENT
Start: 2018-05-20 | End: 2018-05-20

## 2018-05-20 RX ADMIN — PHENOBARBITAL ELIXIR 50 ML: 16.2; .1037; .0065; .0194 ELIXIR ORAL at 09:57

## 2018-05-20 RX ADMIN — ONDANSETRON 4 MG: 4 TABLET, ORALLY DISINTEGRATING ORAL at 09:57

## 2018-05-20 NOTE — LETTER
[facility] EMERGENCY DEPT 
[address] [facility] [address] [address] 
[phone] Work/School Note Date: 5/20/2018 To Whom It May concern: 
 
Favian Mccracken was seen and treated today in the emergency room by the following provider(s): 
Attending Provider: Benjamin Bower MD. Favian Mccracken may return to work on 5/22/18. Sincerely, Ene Kaplan MD

## 2018-05-20 NOTE — DISCHARGE INSTRUCTIONS

## 2018-05-20 NOTE — LETTER
[facility] EMERGENCY DEPT 
[address] [facility] [address] [address] [phone] Work/School Note Date: 5/20/2018 To Whom It May concern: 
 
Layla Villaseñor was seen and treated today in the emergency room by the following provider(s): 
Attending Provider: Clary Mary MD. Layla Villaseñor may return to work on 5/22/18 or sooner if feeling better. Sincerely, Rene Stands

## 2018-05-20 NOTE — ED PROVIDER NOTES
EMERGENCY DEPARTMENT HISTORY AND PHYSICAL EXAM      Date: 5/20/2018  Patient Name: Ren Crandall    History of Presenting Illness     Chief Complaint   Patient presents with    Abdominal Pain     Reports burning abd pain with nausea, \"Like when I had gastritis\"     History Provided By: Patient    HPI: Ren Crandall, 42 y.o. female with PMHx significant for HTN, presents herself to the ED with cc of constant, burning 8/10 general abdominal pain x 7 days. Pt reports of associated nausea, dizziness, and vaginal discharge since onset of pain. She notes she was prior d'x w/ gastritis back in 2007 when she had her EGD. Pt states she went to [facility] on 5/14/18 for her symptoms of which she was given some medications to take. She notes no improvement on pain when taking the prescribed medications and Mylanta. Pt's LMP was on 5/2/18. Pt notes hx of tubal ligation back in 2000. Pt specifically denies vomiting, vaginal bleeding, and dysuria. SHx: -Tobacco, +EtOH(occ), -Illicit Drug    There are no other complaints, changes, or physical findings at this time. PCP: Robb Butterfield, DO    Current Outpatient Prescriptions   Medication Sig Dispense Refill    famotidine (PEPCID) 20 mg tablet Take 1 Tab by mouth two (2) times a day. 20 Tab 0    ondansetron (ZOFRAN ODT) 4 mg disintegrating tablet Take 1 Tab by mouth every eight (8) hours as needed for Nausea. 10 Tab 0    Omeprazole delayed release (PRILOSEC D/R) 20 mg tablet Take 1 Tab by mouth daily. 30 Tab 5    hydroCHLOROthiazide (HYDRODIURIL) 25 mg tablet       amLODIPine (NORVASC) 10 mg tablet Take  by mouth daily. Pt takes 1/2 tablet.  traZODone (DESYREL) 50 mg tablet Take 1 Tab by mouth nightly.  30 Tab 1       Past History     Past Medical History:  Past Medical History:   Diagnosis Date    Gastritis, Helicobacter pylori     Hypertension     Obesity        Past Surgical History:  Past Surgical History:   Procedure Laterality Date    HX TUBAL LIGATION  2000    LAP,TUBAL CAUTERY  2000       Family History:  Family History   Problem Relation Age of Onset   [address] Hypertension Mother     Hypertension Sister        Social History:  Social History   Substance Use Topics    Smoking status: Current Every Day Smoker     Packs/day: 0.25    Smokeless tobacco: Never Used    Alcohol use Yes      Comment: occ       Allergies:  No Known Allergies      Review of Systems   Review of Systems   Constitutional: Negative for fever. HENT: Negative for sore throat. Eyes: Negative for photophobia and redness. Respiratory: Negative for shortness of breath and wheezing. Cardiovascular: Negative for chest pain and leg swelling. Gastrointestinal: Positive for abdominal pain (general) and nausea. Negative for blood in stool and vomiting. Genitourinary: Positive for vaginal discharge. Negative for difficulty urinating, dysuria, hematuria, menstrual problem and vaginal bleeding. Musculoskeletal: Negative for back pain and joint swelling. Neurological: Positive for dizziness. Negative for seizures, syncope, speech difficulty, weakness, numbness and headaches. Hematological: Negative for adenopathy. Psychiatric/Behavioral: Negative for agitation, confusion and suicidal ideas. The patient is not nervous/anxious. Physical Exam   Physical Exam   Constitutional: She is oriented to person, place, and time. She appears well-developed and well-nourished. No distress. HENT:   Head: Normocephalic and atraumatic. Mouth/Throat: Oropharynx is clear and moist. No oropharyngeal exudate. Eyes: Conjunctivae and EOM are normal. Pupils are equal, round, and reactive to light. Left eye exhibits no discharge. Neck: Normal range of motion. Neck supple. No JVD present. Cardiovascular: Normal rate, regular rhythm, normal heart sounds and intact distal pulses. Pulmonary/Chest: Effort normal and breath sounds normal. No respiratory distress. She has no wheezes. Abdominal: Soft.  Bowel sounds are normal. She exhibits no distension. There is no tenderness. There is no rebound and no guarding. Musculoskeletal: Normal range of motion. She exhibits no edema or tenderness. Lymphadenopathy:     She has no cervical adenopathy. Neurological: She is alert and oriented to person, place, and time. She has normal reflexes. No cranial nerve deficit. Skin: Skin is warm and dry. No rash noted. Psychiatric: She has a normal mood and affect. Her behavior is normal.   Nursing note and vitals reviewed. Medical Decision Making   I am the first provider for this patient. I reviewed the vital signs, available nursing notes, past medical history, past surgical history, family history and social history. Vital Signs-Reviewed the patient's vital signs. Patient Vitals for the past 12 hrs:   Temp Pulse Resp BP SpO2   05/20/18 0944 98.4 °F (36.9 °C) 68 14 126/75 100 %     Records Reviewed: Nursing Notes and Old Medical Records    Provider Notes (Medical Decision Making):   DDx: gastritis, GERD, pancreatitis, UTI    ED Course:   Initial assessment performed. The patients presenting problems have been discussed, and they are in agreement with the care plan formulated and outlined with them. I have encouraged them to ask questions as they arise throughout their visit. Discharge Note:  10:19 AM  The pt is ready for discharge. The pt's signs, symptoms, diagnosis, and discharge instructions have been discussed and pt has conveyed their understanding. The pt is to follow up as recommended or return to ER should their symptoms worsen. Plan has been discussed and pt is in agreement. PLAN:  1. Current Discharge Medication List      START taking these medications    Details   famotidine (PEPCID) 20 mg tablet Take 1 Tab by mouth two (2) times a day. Qty: 20 Tab, Refills: 0      ondansetron (ZOFRAN ODT) 4 mg disintegrating tablet Take 1 Tab by mouth every eight (8) hours as needed for Nausea.   Qty: 10 Tab, Refills: 0           2. Follow-up Information     Follow up With Details Comments MD Yvette In 1 week  [address] [facility]  [address]  [phone]          Return to ED if worse     Diagnosis     Clinical Impression:   1. Gastroesophageal reflux disease without esophagitis        Attestations: This note is prepared by [employer], acting as Scribe for Dick Kwong MD.    Dick Kwong MD: The scribe's documentation has been prepared under my direction and personally reviewed by me in its entirety. I confirm that the note above accurately reflects all work, treatment, procedures, and medical decision making performed by me.

## 2018-05-20 NOTE — ED NOTES
Emergency Department Nursing Plan of Care       The Nursing Plan of Care is developed from the Nursing assessment and Emergency Department Attending provider initial evaluation. The plan of care may be reviewed in the ED Provider note. The Plan of Care was developed with the following considerations:   Patient / Family readiness to learn indicated by:verbalized understanding  Persons(s) to be included in education: patient  Barriers to Learning/Limitations:No    Signed     Em Nguyen    5/20/2018   9:53 AM    See nursing assessment    Patient is alert and oriented x 4 and in no acute distress at this time. Respirations are at a regular rate, depth, and pattern. Patient updated on plan of care and has no questions or concerns at this time.

## 2018-05-20 NOTE — ED NOTES
Discharge instructions were given to the patient by JOSEPH Cramer RN. .     The patient left the Emergency Department ambulatory, alert and oriented and in no acute distress with 2 prescription(s). The patient was encouraged to call or return to the ED for worsening symptoms or problems and was encouraged to schedule a follow up appointment for continuing care. Patient leaving ED accompanied by self. The patient verbalized understanding of discharge instructions and prescriptions, all questions were answered. The patient has no further concerns at this time. Patient declined wheelchair transfer upon ED discharge.

## 2018-05-21 ENCOUNTER — OFFICE VISIT (OUTPATIENT)
Dept: INTERNAL MEDICINE CLINIC | Age: 38
End: 2018-05-21

## 2018-05-21 ENCOUNTER — HOSPITAL ENCOUNTER (OUTPATIENT)
Dept: ULTRASOUND IMAGING | Age: 38
Discharge: HOME | End: 2018-05-21
Attending: INTERNAL MEDICINE
Payer: MEDICAID

## 2018-05-21 VITALS
HEART RATE: 68 BPM | SYSTOLIC BLOOD PRESSURE: 123 MMHG | DIASTOLIC BLOOD PRESSURE: 76 MMHG | HEIGHT: 71 IN | BODY MASS INDEX: 37.38 KG/M2 | WEIGHT: 267 LBS | OXYGEN SATURATION: 100 % | RESPIRATION RATE: 19 BRPM

## 2018-05-21 DIAGNOSIS — R76.8 HELICOBACTER PYLORI ANTIBODY POSITIVE: ICD-10-CM

## 2018-05-21 DIAGNOSIS — R10.13 EPIGASTRIC PAIN: ICD-10-CM

## 2018-05-21 DIAGNOSIS — I10 ESSENTIAL HYPERTENSION: ICD-10-CM

## 2018-05-21 DIAGNOSIS — K29.70 HELICOBACTER PYLORI GASTRITIS: ICD-10-CM

## 2018-05-21 DIAGNOSIS — E66.01 SEVERE OBESITY (BMI 35.0-39.9) WITH COMORBIDITY (HCC): ICD-10-CM

## 2018-05-21 DIAGNOSIS — R10.13 EPIGASTRIC PAIN: Primary | ICD-10-CM

## 2018-05-21 DIAGNOSIS — B96.81 HELICOBACTER PYLORI GASTRITIS: ICD-10-CM

## 2018-05-21 PROCEDURE — 76700 US EXAM ABDOM COMPLETE: CPT

## 2018-05-21 RX ORDER — LANSOPRAZOLE 30 MG/1
30 CAPSULE, DELAYED RELEASE ORAL 2 TIMES DAILY
Qty: 20 CAP | Refills: 0 | Status: SHIPPED | OUTPATIENT
Start: 2018-05-21 | End: 2018-05-31

## 2018-05-21 RX ORDER — PHENOL/SODIUM PHENOLATE
40 AEROSOL, SPRAY (ML) MUCOUS MEMBRANE DAILY
Qty: 60 TAB | Refills: 5 | Status: SHIPPED | OUTPATIENT
Start: 2018-05-21 | End: 2018-05-21 | Stop reason: ALTCHOICE

## 2018-05-21 RX ORDER — CLARITHROMYCIN 500 MG/1
500 TABLET, FILM COATED ORAL 2 TIMES DAILY
Qty: 20 TAB | Refills: 0 | Status: SHIPPED | OUTPATIENT
Start: 2018-05-21 | End: 2018-05-31

## 2018-05-21 RX ORDER — AMOXICILLIN 500 MG/1
500 CAPSULE ORAL 3 TIMES DAILY
Qty: 30 CAP | Refills: 0 | Status: SHIPPED | OUTPATIENT
Start: 2018-05-21 | End: 2018-05-31

## 2018-05-21 NOTE — PROGRESS NOTES
Health Maintenance Due   Topic Date Due    Pneumococcal 19-64 Medium Risk (1 of 1 - PPSV23) 12/06/1997    DTaP/Tdap/Td series (1 - Tdap) 12/06/1999       Chief Complaint   Patient presents with   [facility] Follow Up    Hypertension    Insomnia       1. Have you been to the ER, urgent care clinic since your last visit? Hospitalized since your last visit? Yes When: 5/20/18 Where: [facility] Reason for visit: Gastritis    2. Have you seen or consulted any other health care providers outside of the [address] [place] [address] since your last visit? Include any pap smears or colon screening. No    3) Do you have an Advance Directive on file? no    4) Are you interested in receiving information on Advance Directives? NO      Patient is accompanied by self I have received verbal consent from Ren Crandall to discuss any/all medical information while they are present in the room.

## 2018-05-21 NOTE — PROGRESS NOTES
HISTORY OF PRESENT ILLNESS  Bird Apple is a 42 y.o. female. She is here for follow-up. Has a few chronic medical issues. Continues to have GI issues with GERD and stomach burning. History of H pylori treatment a few years ago. I prescribed medications a few weeks ago but they are not helping much. She ended up in the ER a few days ago. A GI cocktail helped. Recent labs showed high H pylori IgA and IgG. Reports having insomnia. She is very obese. Trying to be active physically and watch her diet. Smokes a few cigarettes daily. Drinks alcohol socially. GERD   Associated symptoms include abdominal pain. Pertinent negatives include no chest pain, no headaches and no shortness of breath. ED Follow-up   Associated symptoms include abdominal pain. Pertinent negatives include no chest pain, no headaches and no shortness of breath. Hypertension    Pertinent negatives include no chest pain, no headaches, no dizziness, no shortness of breath, no nausea and no vomiting. Insomnia   Associated symptoms include abdominal pain. Pertinent negatives include no chest pain, no headaches and no shortness of breath. Review of Systems   Constitutional: Negative. HENT: Positive for congestion. Eyes: Negative. Respiratory: Negative for shortness of breath. Cardiovascular: Negative for chest pain and leg swelling. Gastrointestinal: Positive for abdominal pain. Negative for blood in stool, constipation, diarrhea, heartburn, melena, nausea and vomiting. Genitourinary: Negative for dysuria and frequency. Musculoskeletal: Negative for joint pain. Skin: Negative. Neurological: Negative for dizziness, sensory change and headaches. Psychiatric/Behavioral: Negative for depression. The patient has insomnia. The patient is not nervous/anxious. All other systems reviewed and are negative. Physical Exam   Constitutional: She is oriented to person, place, and time.  She appears well-developed and well-nourished. No distress. Pleasant lady  Obese   HENT:   Head: Normocephalic and atraumatic. Mouth/Throat: Oropharynx is clear and moist.   Eyes: Conjunctivae are normal.   Neck: Normal range of motion. Neck supple. No JVD present. No thyromegaly present. Cardiovascular: Normal rate, regular rhythm, normal heart sounds and intact distal pulses. No murmur heard. Pulmonary/Chest: Effort normal and breath sounds normal. No respiratory distress. She has no wheezes. She has no rales. Abdominal: Soft. Bowel sounds are normal. She exhibits no distension and no mass. There is tenderness (Upper abdomen). There is no rebound and no guarding. Musculoskeletal: She exhibits no tenderness. Neurological: She is alert and oriented to person, place, and time. Coordination normal.   Skin: Skin is warm and dry. No rash noted. Psychiatric: She has a normal mood and affect. Her behavior is normal.   Nursing note and vitals reviewed. ASSESSMENT and PLAN  Diagnoses and all orders for this visit:    1. Epigastric pain  -     US ABD COMP; Future  -     [facility]  -     amoxicillin (AMOXIL) 500 mg capsule; Take 1 Cap by mouth three (3) times daily for 10 days. -     clarithromycin (BIAXIN) 500 mg tablet; Take 1 Tab by mouth two (2) times a day for 10 days. -     lansoprazole (PREVACID) 30 mg capsule; Take 1 Cap by mouth two (2) times a day for 10 days. 2. Helicobacter pylori gastritis  -     amoxicillin (AMOXIL) 500 mg capsule; Take 1 Cap by mouth three (3) times daily for 10 days. -     clarithromycin (BIAXIN) 500 mg tablet; Take 1 Tab by mouth two (2) times a day for 10 days. -     lansoprazole (PREVACID) 30 mg capsule; Take 1 Cap by mouth two (2) times a day for 10 days. 3. Essential hypertension    4. Severe obesity (BMI 35.0-39. 9) with comorbidity ([address]    5. Helicobacter pylori antibody positive  -     amoxicillin (AMOXIL) 500 mg capsule;  Take 1 Cap by mouth three (3) times daily for 10 days.  -     clarithromycin (BIAXIN) 500 mg tablet; Take 1 Tab by mouth two (2) times a day for 10 days. -     lansoprazole (PREVACID) 30 mg capsule; Take 1 Cap by mouth two (2) times a day for 10 days. Follow-up Disposition:  Return in about 1 month (around 6/21/2018).    lab results and schedule of future lab studies reviewed with patient  reviewed diet, exercise and weight control  reviewed medications and side effects in detail  We will treat patient with H pylori medications  Will refer to Gi and get abd US as well

## 2018-05-21 NOTE — LETTER
5/23/2018 2:33 PM 
 
Ms. Juanita Newman [employer] Dear Juanita Newman: Please find your most recent results below. Resulted Orders US ABD COMP Narrative EXAM:  US ABD COMP INDICATION: Abdominal pain, nausea, vomiting. COMPARISON: None. TECHNIQUE:  
Real-time sonography of the abdomen was performed with multiple static images of 
the liver, gallbladder, pancreas, spleen, kidneys and retroperitoneum obtained. FINDINGS: 
LIVER:  
The liver is mildly echogenic with no mass or other focal abnormality. LIVER VASCULATURE: The portal vein flow is hepatopedal. 
 
GALLBLADDER: 
Gallstones are noted. There is no gallbladder wall thickening or sonographic Zuleta's sign. COMMON BILE DUCT: 
There is no biliary duct dilatation and the common duct measures 6 mm in 
diameter. PANCREAS: 
The visualized portion of the pancreas is normal in appearance. SPLEEN: 
The spleen is normal in echotexture and size and measures 8.9 cm in length. RIGHT KIDNEY: 
The right kidney demonstrates normal echogenicity with no mass, stone or 
hydronephrosis. The right kidney measures 11.7 cm in length. LEFT KIDNEY: 
The left kidney demonstrates normal echogenicity with no mass, stone or 
hydronephrosis. The left kidney measures 10.7 cm in length. RETROPERITONEUM: 
The aorta tapers normally. The IVC is normal. 
No retroperitoneal mass is identified. Impression IMPRESSION: Cholelithiasis without evidence to suggest acute cholecystitis. Mildly echogenic liver suggests medical renal disease. RECOMMENDATIONS: 
Has gallstones but may not be related have much to do with her abdominal pain Should take antibiotics and Prevacid as ordered yesterday for 10 days and then RTC Please call me if you have any questions: [phone] Sincerely, [facility] US OP 1

## 2018-05-21 NOTE — MR AVS SNAPSHOT
2700 [facility] 102 1400 [address] [place] [address] 
[phone] Patient: Wilson Morris MRN: [id number] :1980 Visit Information Date & Time Provider Department Dept. Phone Encounter #  
 2018 11:00 AM Jesus Alberto Pool, 227 [facility] Internal Medicine [phone] [id number] Follow-up Instructions Return in about 1 month (around 2018). Your Appointments 7/3/2018 10:00 AM  
ESTABLISHED PATIENT with Chely Hancock MD  
[address],Second Floor [place] AT [facility] ([address]) Appt Note: 2 to 3 month follow up [place] [address] [facility] [address]  
[phone]  
  
   
 [place] [address] [place] [address] Upcoming Health Maintenance Date Due Pneumococcal 19-64 Medium Risk (1 of 1 - PPSV23) 1997 DTaP/Tdap/Td series (1 - Tdap) 1999 Influenza Age 5 to Adult 2018 PAP AKA CERVICAL CYTOLOGY 2021 Allergies as of 2018  Review Complete On: 2018 By: Jesus Alberto Pool DO No Known Allergies Current Immunizations  Never Reviewed No immunizations on file. Not reviewed this visit You Were Diagnosed With   
  
 Codes Comments Gastritis without bleeding, unspecified chronicity, unspecified gastritis type    -  Primary ICD-10-CM: K29.70 ICD-9-CM: 535.50 Epigastric pain     ICD-10-CM: R10.13 ICD-9-CM: 789.06 Essential hypertension     ICD-10-CM: I10 
ICD-9-CM: 401.9 Severe obesity (BMI 35.0-39. 9) with comorbidity ([address]     ICD-10-CM: E66.01 
ICD-9-CM: 278.01 Vitals BP Pulse Resp Height(growth percentile) Weight(growth percentile) LMP  
 123/76 (BP 1 Location: Right arm, BP Patient Position: Sitting) 68 19 5' 11\" (1.803 m) 267 lb (121.1 kg) 2018 SpO2 BMI OB Status Smoking Status 100% 37.24 kg/m2 Having regular periods Current Every Day Smoker Vitals History BMI and BSA Data Body Mass Index Body Surface Area  
 37.24 kg/m 2 2.46 m 2 Preferred Pharmacy Pharmacy Name Phone [address] [phone] Your Updated Medication List  
  
   
This list is accurate as of 5/21/18 11:23 AM.  Always use your most recent med list. amLODIPine 10 mg tablet Commonly known as:  Job Dub Take  by mouth daily. Pt takes 1/2 tablet. famotidine 20 mg tablet Commonly known as:  PEPCID Take 1 Tab by mouth two (2) times a day. hydroCHLOROthiazide 25 mg tablet Commonly known as:  HYDRODIURIL Omeprazole delayed release 20 mg tablet Commonly known as:  PRILOSEC D/R Take 2 Tabs by mouth daily. ondansetron 4 mg disintegrating tablet Commonly known as:  ZOFRAN ODT Take 1 Tab by mouth every eight (8) hours as needed for Nausea. traZODone 50 mg tablet Commonly known as:  [place] Macadamia Take 1 Tab by mouth nightly. Prescriptions Sent to Pharmacy Refills Omeprazole delayed release (PRILOSEC D/R) 20 mg tablet 5 Sig: Take 2 Tabs by mouth daily. Class: Normal  
 Pharmacy: [facility] DR SILVER CONKLIN [address] [place] [address] #: [phone] Route: Oral  
  
We Performed the Following REFERRAL TO GASTROENTEROLOGY [IAB78 Custom] Follow-up Instructions Return in about 1 month (around 6/21/2018). To-Do List   
 05/22/2018 2:00 PM  
  Appointment with [address] [facility] 1 at [facility] ([phone]) Shower or bathe using soap and water. Do not use deodorant, powder, perfumes, or lotion the day of your exam.  If your prior mammograms were not performed at [facility] 6 please bring films with you or forward prior images 2 days before your procedure. Check in at registration 15min before your appointment time unless you were instructed to do otherwise.   A script is not necessary, but if you have one, please bring it on the day of the mammogram or have it faxed to the department. [facility] [phone] [facility]  [phone] [facility] [address]  [phone] [facility] [phone] Sandie Li [phone]  
  
 05/25/2018 Imaging:  US ABD COMP Referral Information Referral ID Referred By Referred To  
  
 [id number] Campbell Sunita Not Available Visits Status Start Date End Date 1 New Request 5/21/18 5/21/19 If your referral has a status of pending review or denied, additional information will be sent to support the outcome of this decision. Referral ID Referred By Referred To  
 [id number] [facility] [address] [place] [address] [phone] [place] [id number] Millis Henrye Visits Status Start Date End Date 1 New Request 5/21/18 5/21/19 If your referral has a status of pending review or denied, additional information will be sent to support the outcome of this decision. Introducing [facility]! Dear Lolita Mustafa: 
Thank you for requesting a [employer] account. Our records indicate that you already have an active [employer] account. You can access your account anytime at [email or web address] Did you know that you can access your hospital and ER discharge instructions at any time in [employer]? You can also review all of your test results from your hospital stay or ER visit. Additional Information If you have questions, please visit the Frequently Asked Questions section of the [employer] website at [email or web address] Remember, [employer] is NOT to be used for urgent needs. For medical emergencies, dial 911. Now available from your iPhone and Android! Please provide this summary of care documentation to your next provider. Your primary care clinician is listed as Theador Hence. If you have any questions after today's visit, please call [phone].

## 2018-05-21 NOTE — LETTER
NOTIFICATION RETURN TO WORK / SCHOOL 
 
5/21/2018 11:45 AM 
 
Ms. Robert Cruz [address] To Whom It May Concern: 
 
Robert Cruz is currently under the care of [address] [facility]rena. She will return to work/school on: 05/21/2018 If there are questions or concerns please have the patient contact our office.  
 
 
 
Sincerely, 
 
 
Inis Cushing, DO

## 2018-05-22 NOTE — PROGRESS NOTES
Has gallstones but may not be related have much to do with her abdominal pain  Should take antibiotics and Prevacid as ordered yesterday for 10 days and then RTC

## 2018-05-23 NOTE — TELEPHONE ENCOUNTER
Writer spoke with pt on 5/21/18 regarding H. Pylori diagnosis. Pt stated understanding and will begin treatment immediately. Pt will contact clinic with any other questions.

## 2018-05-29 ENCOUNTER — TELEPHONE (OUTPATIENT)
Dept: INTERNAL MEDICINE CLINIC | Age: 38
End: 2018-05-29

## 2018-05-29 NOTE — TELEPHONE ENCOUNTER
Pt called to report she was having a burning sensation in her stomach and wanted advice (after eating fried chicken and lemonade on and off this past holiday weekend). Eating a BLAND diet and avoiding fatty, fried foods, and acidic foods until she finishes abx and heals from h pylori infection was reiterated. She expressed understanding and had no further questions.

## 2018-06-06 ENCOUNTER — OFFICE VISIT (OUTPATIENT)
Dept: OBGYN CLINIC | Age: 38
End: 2018-06-06

## 2018-06-06 VITALS
RESPIRATION RATE: 18 BRPM | HEIGHT: 71 IN | BODY MASS INDEX: 38.08 KG/M2 | DIASTOLIC BLOOD PRESSURE: 85 MMHG | SYSTOLIC BLOOD PRESSURE: 135 MMHG | WEIGHT: 272 LBS | HEART RATE: 85 BPM

## 2018-06-06 DIAGNOSIS — N97.1 TUBAL INFERTILITY IN FEMALE: ICD-10-CM

## 2018-06-06 DIAGNOSIS — N89.8 VAGINAL DISCHARGE: Primary | ICD-10-CM

## 2018-06-06 NOTE — PROGRESS NOTES
Vaginitis evaluation    Chief Complaint   Vaginal Discharge      HPI  42 y.o. female complains of white vaginal discharge for a week or so. No LMP recorded. Patient with recent antibiotic use for H pylori. No itching or odor. The patient denies aggravating factors. She is not concerned about possible STI exposure at this time. She denies exposure to new chemicals ot hygenic agents  Previous treatment included: none    Patient also reports that she quit smoking without the patches. Has not had a cigarette for over a month and a half. Discussed at length tubal reversal and IVF. Patient is seriously considering having another baby. Past Medical History:   Diagnosis Date    Gastritis, Helicobacter pylori     Hypertension     Obesity      Past Surgical History:   Procedure Laterality Date    HX TUBAL LIGATION  2000    LAP,TUBAL CAUTERY  2000     Social History     Occupational History    Not on file. Social History Main Topics    Smoking status: Current Every Day Smoker     Packs/day: 0.25    Smokeless tobacco: Never Used    Alcohol use Yes      Comment: occ    Drug use: No    Sexual activity: Not Currently     Birth control/ protection: Surgical     Family History   Problem Relation Age of Onset    Hypertension Mother     Hypertension Sister         No Known Allergies  Prior to Admission medications    Medication Sig Start Date End Date Taking? Authorizing Provider   famotidine (PEPCID) 20 mg tablet Take 1 Tab by mouth two (2) times a day. 5/20/18   Nicholas Byers MD   ondansetron (ZOFRAN ODT) 4 mg disintegrating tablet Take 1 Tab by mouth every eight (8) hours as needed for Nausea. 5/20/18   Nicholas Byers MD   traZODone (DESYREL) 50 mg tablet Take 1 Tab by mouth nightly. 5/15/18   Sal Parikh DO   hydroCHLOROthiazide (HYDRODIURIL) 25 mg tablet  3/21/18   Historical Provider   amLODIPine (NORVASC) 10 mg tablet Take  by mouth daily. Pt takes 1/2 tablet.     Stas Mazariegos MD Review of Systems - History obtained from the patient  Constitutional: negative for weight loss, fever, night sweats  Breast: negative for breast lumps, nipple discharge, galactorrhea  GI: negative for change in bowel habits, abdominal pain, black or bloody stools  : negative for frequency, dysuria, hematuria  MSK: negative for back pain, joint pain, muscle pain  Skin: negative for itching, rash, hives  Neuro: negative for dizziness, headache, confusion, weakness  Psych: negative for anxiety, depression, change in mood  Heme/lymph: negative for bleeding, bruising, pallor       Objective: There were no vitals taken for this visit. Physical Exam:   PHYSICAL EXAMINATION    Constitutional  · Appearance: well-nourished, well developed, alert, in no acute distress    HENT  · Head and Face: appears normal    Genitourinary  · External Genitalia: normal appearance for age, no discharge present, no tenderness present, no inflammatory lesions present, no masses present, no atrophy present  · Vagina: Moderate thin white discharge present, otherwise normal vaginal vault without central or paravaginal defects, no inflammatory lesions present, no masses present  · Bladder: non-tender to palpation  · Urethra: appears normal  · Cervix: normal   · Uterus: normal size, shape and consistency  · Adnexa: no adnexal tenderness present, no adnexal masses present  · Perineum: perineum within normal limits, no evidence of trauma, no rashes or skin lesions present  · Anus: anus within normal limits, no hemorrhoids present  · Inguinal Lymph Nodes: no lymphadenopathy present    Skin  · General Inspection: no rash, no lesions identified    Neurologic/Psychiatric  · Mental Status:  · Orientation: grossly oriented to person, place and time  · Mood and Affect: mood normal, affect appropriate        No results found for any visits on 06/06/18.     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 Rashaad Chapman for discussion on possible IVF    ROV prn if symptoms persist or worsen. Spent greater than 25 minutes with patient, over 50% of which was spent counselling.

## 2018-06-06 NOTE — MR AVS SNAPSHOT
[address] [facility] 
 
 
 [place] [address] [place] [address] 
[phone] Patient: Lisa Owusu MRN: [id number] :1980 Visit Information Date & Time Provider Department Dept. Phone Encounter #  
 2018  3:10 PM Avel Hahn MD [place] [address] O[facility] AT [address] [facility] [id number] Follow-up Instructions Return if symptoms worsen or fail to improve. Your Appointments 7/3/2018 10:00 AM  
ESTABLISHED PATIENT with Avel Hahn MD  
[address],Second Floor [place] AT [facility] ([facility]) Appt Note: 2 to 3 month follow up [place] [address] [place] [address] [address]  
[phone]  
  
   
 [place] [address] Upcoming Health Maintenance Date Due Influenza Age 5 to Adult 2018 PAP AKA CERVICAL CYTOLOGY 2021 Allergies as of 2018  Review Complete On: 2018 By: Uma Bridges LPN No Known Allergies Current Immunizations  Never Reviewed No immunizations on file. Not reviewed this visit You Were Diagnosed With   
  
 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  
 135/85 85 18 5' 11\" (1.803 m) 272 lb (123.4 kg) 2018 BMI OB Status Smoking Status 37.94 kg/m2 Having regular periods Current Every Day Smoker BMI and BSA Data Body Mass Index Body Surface Area  
 37.94 kg/m 2 2.49 m 2 Preferred Pharmacy Pharmacy Name Phone [facility] Radha, [place] [phone] Your Updated Medication List  
  
   
This list is accurate as of 18  4:09 PM.  Always use your most recent med list. amLODIPine 10 mg tablet Commonly known as:  Geronimo Taylor Take  by mouth daily. Pt takes 1/2 tablet. famotidine 20 mg tablet Commonly known as:  PEPCID Take 1 Tab by mouth two (2) times a day. hydroCHLOROthiazide 25 mg tablet Commonly known as:  HYDRODIURIL  
  
 ondansetron 4 mg disintegrating tablet Commonly known as:  ZOFRAN ODT Take 1 Tab by mouth every eight (8) hours as needed for Nausea. prenatal vit-calcium-iron-fa 27 mg iron- 1 mg Tab Commonly known as:  PRENATAL PLUS with CALCIUM Take 1 Tab by mouth daily. traZODone 50 mg tablet Commonly known as:  [place] Macadamia Take 1 Tab by mouth nightly. Prescriptions Sent to Pharmacy Refills  
 prenatal vit-calcium-iron-fa (PRENATAL PLUS WITH CALCIUM) 27 mg iron- 1 mg tab 11 Sig: Take 1 Tab by mouth daily. Class: Normal  
 Pharmacy: [address] #: [phone] Route: Oral  
  
We Performed the Following NUSWAB VAGINITIS [[id number] Custom] REFERRAL TO INFERTILITY [DGF48 Custom] Comments:  
 Patient with tubal ligation in 25s. She would like to discuss the possibility of IVF vs tubal reversal.  
  
Follow-up Instructions Return if symptoms worsen or fail to improve. Referral Information Referral ID Referred By Referred To  
  
 [id number] Renate Livingston MD   
   [address] [place] [address] Pkwy Phone: [phone] Fax: [phone] Visits Status Start Date End Date 1 New Request 6/6/18 6/6/19 If your referral has a status of pending review or denied, additional information will be sent to support the outcome of this decision. Patient Instructions Bacterial Vaginosis: Care Instructions Your Care Instructions Bacterial vaginosis is a type of vaginal infection. It is caused by excess growth of certain bacteria that are normally found in the vagina.  Symptoms can include itching, swelling, pain when you urinate or have sex, and a gray or yellow discharge with a \"fishy\" odor. It is not considered an infection that is spread through sexual contact. Although symptoms can be annoying and uncomfortable, bacterial vaginosis does not usually cause other health problems. However, if you have it while you are pregnant, it can cause complications. While the infection may go away on its own, most doctors use antibiotics to treat it. You may have been prescribed pills or vaginal cream. With treatment, bacterial vaginosis usually clears up in 5 to 7 days. Follow-up care is a key part of your treatment and safety. Be sure to make and go to all appointments, and call your doctor if you are having problems. It's also a good idea to know your test results and keep a list of the medicines you take. How can you care for yourself at home? · Take your antibiotics as directed. Do not stop taking them just because you feel better. You need to take the full course of antibiotics. · Do not eat or drink anything that contains alcohol if you are taking metronidazole (Flagyl). · Keep using your medicine if you start your period. Use pads instead of tampons while using a vaginal cream or suppository. Tampons can absorb the medicine. · Wear loose cotton clothing. Do not wear nylon and other materials that hold body heat and moisture close to the skin. · Do not scratch. Relieve itching with a cold pack or a cool bath. · Do not wash your vaginal area more than once a day. Use plain water or a mild, unscented soap. Do not douche. When should you call for help? Watch closely for changes in your health, and be sure to contact your doctor if: 
? · You have unexpected vaginal bleeding. ? · You have a fever. ? · You have new or increased pain in your vagina or pelvis. ? · You are not getting better after 1 week. ? · Your symptoms return after you finish the course of your medicine. Where can you learn more? Go to http://tanisha-verito.info/. Martha Lebron in the search box to learn more about \"Bacterial Vaginosis: Care Instructions. \" Current as of: October 13, 2016 Content Version: 11.4 © 7832-6281 [employer]. Care instructions adapted under license by [employer] (which disclaims liability or warranty for this information). If you have questions about a medical condition or this instruction, always ask your healthcare professional. [place] [address] any warranty or liability for your use of this information. Introducing [address]! Dear Giovanny Guzman: 
Thank you for requesting a [employer] account. Our records indicate that you already have an active [employer] account. You can access your account anytime at [email or web address] Did you know that you can access your hospital and ER discharge instructions at any time in [employer]? You can also review all of your test results from your hospital stay or ER visit. Additional Information If you have questions, please visit the Frequently Asked Questions section of the [employer] website at [email or web address] Remember, [employer] is NOT to be used for urgent needs. For medical emergencies, dial 911. Now available from your iPhone and Android! Please provide this summary of care documentation to your next provider. Your primary care clinician is listed as Sidney Roberts. If you have any questions after today's visit, please call [phone].

## 2018-06-06 NOTE — PATIENT INSTRUCTIONS
Bacterial Vaginosis: Care Instructions  Your Care Instructions    Bacterial vaginosis is a type of vaginal infection. It is caused by excess growth of certain bacteria that are normally found in the vagina. Symptoms can include itching, swelling, pain when you urinate or have sex, and a gray or yellow discharge with a \"fishy\" odor. It is not considered an infection that is spread through sexual contact. Although symptoms can be annoying and uncomfortable, bacterial vaginosis does not usually cause other health problems. However, if you have it while you are pregnant, it can cause complications. While the infection may go away on its own, most doctors use antibiotics to treat it. You may have been prescribed pills or vaginal cream. With treatment, bacterial vaginosis usually clears up in 5 to 7 days. Follow-up care is a key part of your treatment and safety. Be sure to make and go to all appointments, and call your doctor if you are having problems. It's also a good idea to know your test results and keep a list of the medicines you take. How can you care for yourself at home? · Take your antibiotics as directed. Do not stop taking them just because you feel better. You need to take the full course of antibiotics. · Do not eat or drink anything that contains alcohol if you are taking metronidazole (Flagyl). · Keep using your medicine if you start your period. Use pads instead of tampons while using a vaginal cream or suppository. Tampons can absorb the medicine. · Wear loose cotton clothing. Do not wear nylon and other materials that hold body heat and moisture close to the skin. · Do not scratch. Relieve itching with a cold pack or a cool bath. · Do not wash your vaginal area more than once a day. Use plain water or a mild, unscented soap. Do not douche. When should you call for help?   Watch closely for changes in your health, and be sure to contact your doctor if:  ? · You have unexpected vaginal bleeding. ? · You have a fever. ? · You have new or increased pain in your vagina or pelvis. ? · You are not getting better after 1 week. ? · Your symptoms return after you finish the course of your medicine. Where can you learn more? Go to http://tanisha-verito.info/. Olivier Starkey in the search box to learn more about \"Bacterial Vaginosis: Care Instructions. \"  Current as of: October 13, 2016  Content Version: 11.4  © 3830-1098 [employer]. Care instructions adapted under license by [employer] (which disclaims liability or warranty for this information). If you have questions about a medical condition or this instruction, always ask your healthcare professional. [address] any warranty or liability for your use of this information.

## 2018-06-09 LAB
A VAGINAE DNA VAG QL NAA+PROBE: NORMAL SCORE
BVAB2 DNA VAG QL NAA+PROBE: NORMAL SCORE
C ALBICANS DNA VAG QL NAA+PROBE: NEGATIVE
C GLABRATA DNA VAG QL NAA+PROBE: NEGATIVE
MEGA1 DNA VAG QL NAA+PROBE: NORMAL SCORE
T VAGINALIS RRNA SPEC QL NAA+PROBE: NEGATIVE

## 2018-06-11 RX ORDER — TRAZODONE HYDROCHLORIDE 50 MG/1
50 TABLET ORAL
Qty: 30 TAB | Refills: 1 | Status: SHIPPED | OUTPATIENT
Start: 2018-06-11 | End: 2020-01-14 | Stop reason: SDUPTHER

## 2018-06-11 NOTE — TELEPHONE ENCOUNTER
From: Nasrin Boyd  To:  Waldo Juarez DO  Sent: 6/6/2018 4:39 PM EDT  Subject: Medication Renewal Request    Original authorizing provider: DO Nasrin Azul would like a refill of the following medications:  traZODone (DESYREL) 50 mg tablet Waldo Juarez DO]    Preferred pharmacy: [address] [place] [address] Judiths Suzanne DANIELSON    Comment:

## 2018-06-15 RX ORDER — HYDROCHLOROTHIAZIDE 25 MG/1
25 TABLET ORAL DAILY
Qty: 30 TAB | Refills: 5 | Status: SHIPPED | OUTPATIENT
Start: 2018-06-15 | End: 2018-07-12 | Stop reason: SDUPTHER

## 2018-07-10 ENCOUNTER — HOSPITAL ENCOUNTER (EMERGENCY)
Age: 38
Discharge: HOME | End: 2018-07-10
Attending: EMERGENCY MEDICINE
Payer: COMMERCIAL

## 2018-07-10 VITALS
SYSTOLIC BLOOD PRESSURE: 115 MMHG | HEIGHT: 71 IN | TEMPERATURE: 98.2 F | RESPIRATION RATE: 16 BRPM | DIASTOLIC BLOOD PRESSURE: 71 MMHG | HEART RATE: 82 BPM | BODY MASS INDEX: 36.96 KG/M2 | OXYGEN SATURATION: 99 % | WEIGHT: 264 LBS

## 2018-07-10 DIAGNOSIS — D17.79 LIPOMA OF OTHER SPECIFIED SITES: Primary | ICD-10-CM

## 2018-07-10 PROCEDURE — 99282 EMERGENCY DEPT VISIT SF MDM: CPT

## 2018-07-10 RX ORDER — ACETAMINOPHEN 500 MG
1000 TABLET ORAL
Qty: 20 TAB | Refills: 0 | Status: SHIPPED | OUTPATIENT
Start: 2018-07-10 | End: 2019-04-16

## 2018-07-10 NOTE — ED PROVIDER NOTES
EMERGENCY DEPARTMENT HISTORY AND PHYSICAL EXAM    Date: 7/10/2018  Patient Name: Giovani Ragland    History of Presenting Illness     Chief Complaint   Patient presents with    Shoulder Pain     reports that she noticed a knot on her left shoulder near base of neck about 45 minutes ago, tender to touch         History Provided By: Patient      HPI: Giovani Ragland is a 42 y.o. female with a PMH of hypertension, obesity and gastritis who presents with acute mild aching left upper back/ shoulder \"lump\" noticed 45 min pta while rubbing her shoulder. NKI or falls. PT denies hx of similar skin lumps. No modifying factors. Denies fever, chills, n/v, headache, neck stiffness/ pain, LROM, numbness/tingling. PCP: Mecca Leggett, DO    Current Outpatient Prescriptions   Medication Sig Dispense Refill    acetaminophen (TYLENOL) 500 mg tablet Take 2 Tabs by mouth every six (6) hours as needed for Pain. 20 Tab 0    hydroCHLOROthiazide (HYDRODIURIL) 25 mg tablet Take 1 Tab by mouth daily. 30 Tab 5    traZODone (DESYREL) 50 mg tablet Take 1 Tab by mouth nightly. 30 Tab 1    prenatal vit-calcium-iron-fa (PRENATAL PLUS WITH CALCIUM) 27 mg iron- 1 mg tab Take 1 Tab by mouth daily. 30 Tab 11    famotidine (PEPCID) 20 mg tablet Take 1 Tab by mouth two (2) times a day. 20 Tab 0    ondansetron (ZOFRAN ODT) 4 mg disintegrating tablet Take 1 Tab by mouth every eight (8) hours as needed for Nausea. 10 Tab 0    amLODIPine (NORVASC) 10 mg tablet Take  by mouth daily. Pt takes 1/2 tablet.          Past History     Past Medical History:  Past Medical History:   Diagnosis Date    Gastritis, Helicobacter pylori     Hypertension     Obesity        Past Surgical History:  Past Surgical History:   Procedure Laterality Date    HX TUBAL LIGATION  2000    LAP,TUBAL CAUTERY  2000       Family History:  Family History   Problem Relation Age of Onset    Hypertension Mother     Hypertension Sister        Social History:  Social History Substance Use Topics    Smoking status: Current Every Day Smoker     Packs/day: 0.25    Smokeless tobacco: Never Used    Alcohol use Yes      Comment: occ       Allergies:  No Known Allergies      Review of Systems   Review of Systems   Constitutional: Negative. Negative for activity change, chills, fatigue and fever. HENT: Negative. Eyes: Negative. Negative for pain. Respiratory: Negative. Negative for cough and shortness of breath. Cardiovascular: Negative. Negative for chest pain. Gastrointestinal: Negative. Negative for abdominal pain, diarrhea, nausea and vomiting. Genitourinary: Negative. Negative for difficulty urinating and dysuria. Musculoskeletal: Positive for myalgias. Negative for arthralgias and joint swelling. Skin: Negative for rash and wound. Neurological: Negative. Negative for headaches. Psychiatric/Behavioral: Negative. Physical Exam     Vitals:    07/10/18 1825   BP: 115/71   Pulse: 82   Resp: 16   Temp: 98.2 °F (36.8 °C)   SpO2: 99%   Weight: 119.7 kg (264 lb)   Height: 5' 11\" (1.803 m)     Physical Exam   Constitutional: She is oriented to person, place, and time. She appears well-developed and well-nourished. No distress. HENT:   Head: Normocephalic and atraumatic. Right Ear: Hearing and external ear normal.   Left Ear: Hearing and external ear normal.   Nose: Nose normal.   Eyes: Conjunctivae and EOM are normal. Pupils are equal, round, and reactive to light. Neck: Normal range of motion, full passive range of motion without pain and phonation normal. No spinous process tenderness and no muscular tenderness present. No rigidity. No edema, no erythema and normal range of motion present. Cardiovascular:   Pulses:       Radial pulses are 2+ on the right side, and 2+ on the left side. Pulmonary/Chest: Effort normal. No respiratory distress. Musculoskeletal: Normal range of motion.         Cervical back: Normal.        Thoracic back: Normal. Neurological: She is alert and oriented to person, place, and time. Skin: Skin is warm, dry and intact. No rash noted. She is not diaphoretic. No erythema. Psychiatric: She has a normal mood and affect. Her behavior is normal. Judgment and thought content normal.   Nursing note and vitals reviewed. Diagnostic Study Results     Labs -   No results found for this or any previous visit (from the past 12 hour(s)). Radiologic Studies -   No orders to display     CT Results  (Last 48 hours)    None        CXR Results  (Last 48 hours)    None            Medical Decision Making   I am the first provider for this patient. I reviewed the vital signs, available nursing notes, past medical history, past surgical history, family history and social history. Vital Signs-Reviewed the patient's vital signs. Records Reviewed: Nursing Notes and Old Medical Records    ED Course:     Disposition:    DISCHARGE NOTE:   6:41 PM      Care plan outlined and precautions discussed. Patient has no new complaints, changes, or physical findings. Results of exam were reviewed with the patient. All medications were reviewed with the patient; will d/c home with tylenol. All of pt's questions and concerns were addressed. Patient was instructed and agrees to follow up with PCP, as well as to return to the ED upon further deterioration. Patient is ready to go home. Follow-up Information     Follow up With Details Comments Contact Info    Jesus Krause DO Schedule an appointment as soon as possible for a visit in 3 days As needed, If symptoms worsen [address] [facility]  [address]  [phone]            Current Discharge Medication List      START taking these medications    Details   acetaminophen (TYLENOL) 500 mg tablet Take 2 Tabs by mouth every six (6) hours as needed for Pain.   Qty: 20 Tab, Refills: 0         CONTINUE these medications which have NOT CHANGED    Details   hydroCHLOROthiazide (HYDRODIURIL) 25 mg tablet Take 1 Tab by mouth daily. Qty: 30 Tab, Refills: 5      traZODone (DESYREL) 50 mg tablet Take 1 Tab by mouth nightly. Qty: 30 Tab, Refills: 1      prenatal vit-calcium-iron-fa (PRENATAL PLUS WITH CALCIUM) 27 mg iron- 1 mg tab Take 1 Tab by mouth daily. Qty: 30 Tab, Refills: 11      famotidine (PEPCID) 20 mg tablet Take 1 Tab by mouth two (2) times a day. Qty: 20 Tab, Refills: 0      ondansetron (ZOFRAN ODT) 4 mg disintegrating tablet Take 1 Tab by mouth every eight (8) hours as needed for Nausea. Qty: 10 Tab, Refills: 0      amLODIPine (NORVASC) 10 mg tablet Take  by mouth daily. Pt takes 1/2 tablet. Provider Notes (Medical Decision Making):   DDx: lipoma, cyst, abscess, malignancy unlikely    Procedures:  Procedures        Diagnosis     Clinical Impression:   1.  Lipoma of other specified sites

## 2018-07-10 NOTE — ED NOTES
Emergency Department Nursing Plan of Care       The Nursing Plan of Care is developed from the Nursing assessment and Emergency Department Attending provider initial evaluation. The plan of care may be reviewed in the ED Provider note.     The Plan of Care was developed with the following considerations:   Patient / Family readiness to learn indicated by:verbalized understanding  Persons(s) to be included in education: patient  Barriers to Learning/Limitations:No    [facility]    7/10/2018   6:33 PM

## 2018-07-10 NOTE — DISCHARGE INSTRUCTIONS
Epidermoid Cyst: Care Instructions  Your Care Instructions  An epidermoid (say \"ks-wow-YYY-kishore\") cyst is a lump just under the skin. These cysts can form when a hair follicle becomes blocked. They are common in acne and may occur on the face, neck, back, and genitals. However, they can form anywhere on the body. These cysts are not cancer and do not lead to cancer. They tend not to hurt, but they can sometimes become swollen and painful. They also may break open (rupture) and cause scarring. These cysts sometimes do not cause problems and may not need treatment. If you have a cyst that is swollen and hurts, your doctor may inject it with a medicine to help it heal. But it is more likely that a painful cyst will need to be removed. Your doctor will give you a shot of numbing medicine and cut into the cyst to drain it or remove it. This makes the symptoms go away. Follow-up care is a key part of your treatment and safety. Be sure to make and go to all appointments, and call your doctor if you are having problems. It's also a good idea to know your test results and keep a list of the medicines you take. How can you care for yourself at home? · Do not squeeze the cyst or poke it with a needle to open it. This can cause swelling, redness, and infection. · Always have a doctor look at any new lumps you get to make sure that they are not serious. When should you call for help? Watch closely for changes in your health, and be sure to contact your doctor if:    · You have a fever, redness, or swelling after you get a shot of medicine in the cyst.     · You see or feel a new lump on your skin. Where can you learn more? Go to http://tanisha-verito.info/. Enter F537 in the search box to learn more about \"Epidermoid Cyst: Care Instructions. \"  Current as of: October 5, 2017  Content Version: 11.7  © 0004-8905 [employer].  Care instructions adapted under license by [facility] (which disclaims liability or warranty for this information). If you have questions about a medical condition or this instruction, always ask your healthcare professional. [address] any warranty or liability for your use of this information. Lipoma: Care Instructions  Your Care Instructions  A lipoma is a growth of fat just below the skin. It may feel soft and rubbery. Lipomas can occur anywhere on the body. But they are most common on the torso, neck, upper thighs, upper arms, and armpits. A lipoma does not turn into cancer. Lipomas usually are not treated, because most of them don't hurt or cause problems. But your doctor may remove a lipoma if it is painful, gets infected, or bothers you. Follow-up care is a key part of your treatment and safety. Be sure to make and go to all appointments, and call your doctor if you are having problems. It's also a good idea to know your test results and keep a list of the medicines you take. How can you care for yourself at home? · A lipoma usually needs no care at home unless your doctor made a cut (incision) to remove it. · If your doctor told you how to care for your incision, follow your doctor's instructions. If you did not get instructions, follow this general advice:  ¨ Wash around the incision with clean water 2 times a day. Don't use hydrogen peroxide or alcohol. These can slow healing. ¨ You may cover the incision with a thin layer of petroleum jelly, such as Vaseline, and a nonstick bandage. ¨ Apply more petroleum jelly and replace the bandage as needed. When should you call for help? Call your doctor now or seek immediate medical care if:    · You have signs of infection, such as:  ¨ Increased pain, swelling, warmth, or redness. ¨ Red streaks leading from the lipoma. ¨ Pus draining from the lipoma.   ¨ A fever.    Watch closely for changes in your health, and be sure to contact your doctor if:    · The lipoma is growing or changing.     · You do not get better as expected. Where can you learn more? Go to http://tanisha-verito.info/. Enter D859 in the search box to learn more about \"Lipoma: Care Instructions. \"  Current as of: October 5, 2017  Content Version: 11.7  © 1772-1275 [employer]. Care instructions adapted under license by [employer] (which disclaims liability or warranty for this information). If you have questions about a medical condition or this instruction, always ask your healthcare professional. [address] any warranty or liability for your use of this information.

## 2018-07-11 ENCOUNTER — TELEPHONE (OUTPATIENT)
Dept: OBGYN CLINIC | Age: 38
End: 2018-07-11

## 2018-07-11 NOTE — TELEPHONE ENCOUNTER
Called pt to follow up on referral to Dr. Yokasta De Leon, pt states that she lost the phone number, given Dr. Alexis Mcneill number again to call and schedule the appt.

## 2018-07-12 RX ORDER — HYDROCHLOROTHIAZIDE 25 MG/1
25 TABLET ORAL DAILY
Qty: 30 TAB | Refills: 5 | Status: SHIPPED | OUTPATIENT
Start: 2018-07-12 | End: 2019-01-12 | Stop reason: SDUPTHER

## 2018-07-12 NOTE — TELEPHONE ENCOUNTER
Patient is requesting new prescription for amlodipine since there are no refills  Last ov 5/21/18  Next ov 7/19/18 scheduled with Yony Sears

## 2018-07-12 NOTE — TELEPHONE ENCOUNTER
From: Katerina Xiong  To:  Robyn Bolden DO  Sent: 7/12/2018 5:22 AM EDT  Subject: Medication Renewal Request    Original authorizing provider: DO Katerina Greenberg would like a refill of the following medications:  hydroCHLOROthiazide (HYDRODIURIL) 25 mg tablet Robyn Bolden DO]    Preferred pharmacy: [address] [place] [address] [address]    Comment:

## 2018-07-13 ENCOUNTER — TELEPHONE (OUTPATIENT)
Dept: INTERNAL MEDICINE CLINIC | Age: 38
End: 2018-07-13

## 2018-07-13 RX ORDER — HYDROCHLOROTHIAZIDE 25 MG/1
25 TABLET ORAL DAILY
Qty: 30 TAB | Refills: 5 | OUTPATIENT
Start: 2018-07-13

## 2018-07-13 RX ORDER — AMLODIPINE BESYLATE 10 MG/1
10 TABLET ORAL DAILY
Qty: 30 TAB | Refills: 2 | Status: SHIPPED | OUTPATIENT
Start: 2018-07-13 | End: 2018-08-11 | Stop reason: SDUPTHER

## 2018-08-13 ENCOUNTER — OFFICE VISIT (OUTPATIENT)
Dept: INTERNAL MEDICINE CLINIC | Age: 38
End: 2018-08-13

## 2018-08-13 VITALS
OXYGEN SATURATION: 99 % | HEIGHT: 71 IN | WEIGHT: 263 LBS | BODY MASS INDEX: 36.82 KG/M2 | HEART RATE: 74 BPM | SYSTOLIC BLOOD PRESSURE: 118 MMHG | TEMPERATURE: 98.5 F | DIASTOLIC BLOOD PRESSURE: 81 MMHG | RESPIRATION RATE: 16 BRPM

## 2018-08-13 DIAGNOSIS — I10 ESSENTIAL HYPERTENSION: ICD-10-CM

## 2018-08-13 DIAGNOSIS — E66.01 SEVERE OBESITY (BMI 35.0-39.9) WITH COMORBIDITY (HCC): ICD-10-CM

## 2018-08-13 DIAGNOSIS — R22.1 NECK MASS: Primary | ICD-10-CM

## 2018-08-13 DIAGNOSIS — D17.0 LIPOMA OF NECK: ICD-10-CM

## 2018-08-13 RX ORDER — AMLODIPINE BESYLATE 10 MG/1
10 TABLET ORAL DAILY
Qty: 90 TAB | Refills: 1 | Status: SHIPPED | OUTPATIENT
Start: 2018-08-13 | End: 2019-02-08 | Stop reason: SDUPTHER

## 2018-08-13 RX ORDER — AMLODIPINE BESYLATE 10 MG/1
5 TABLET ORAL DAILY
Qty: 30 TAB | Refills: 2 | Status: SHIPPED | OUTPATIENT
Start: 2018-08-13 | End: 2018-08-13 | Stop reason: SDUPTHER

## 2018-08-13 NOTE — TELEPHONE ENCOUNTER
Last OV: 5-21-18  Next visit: none  Last labs: 5-15-18    Refill request for amlodipine 10 mg tab forwarded to provider for approval

## 2018-08-13 NOTE — PROGRESS NOTES
Patient identified with 2 ID's, Name and     Audra Sousa is a 42 y.o. female  Chief Complaint   Patient presents with    Other     knot on left side of neck       1. Have you been to the ER, urgent care clinic since your last visit? Hospitalized since your last visit? Yes [facility] ER for knot on left side of neck about 3 weeks ago. 2. Have you seen or consulted any other health care providers outside of the [address] [place] [address] since your last visit? Include any pap smears or colon screening. No      In the event something were to happen to you and you were unable to speak on your behalf, do you have an Advance Directive/ Living Will in place stating your wishes? No      If no, would you like information? Yes, information provided     Visit Vitals    /81 (BP 1 Location: Left arm, BP Patient Position: Sitting)    Pulse 74    Temp 98.5 °F (36.9 °C) (Oral)    Resp 16    Ht 5' 11\" (1.803 m)    Wt 263 lb (119.3 kg)    SpO2 99%    BMI 36.68 kg/m2         Medication Reconciliation reviewed with patient on this date      Fall Risk Assessment, last 12 mths 2018   Able to walk? Yes   Fall in past 12 months? No       PHQ over the last two weeks 2018   Little interest or pleasure in doing things Several days   Feeling down, depressed, irritable, or hopeless Several days   Total Score PHQ 2 2       Learning Assessment 2018   PRIMARY LEARNER Patient   HIGHEST LEVEL OF EDUCATION - PRIMARY LEARNER  2 YEARS OF COLLEGE   BARRIERS PRIMARY LEARNER NONE   CO-LEARNER CAREGIVER No   PRIMARY LANGUAGE ENGLISH    NEED No   LEARNER PREFERENCE PRIMARY DEMONSTRATION   LEARNING SPECIAL TOPICS no   ANSWERED BY patient   RELATIONSHIP SELF   ASSESSMENT COMMENT done 18 RR       Abuse Screening Questionnaire 2018   Do you ever feel afraid of your partner? N   Are you in a relationship with someone who physically or mentally threatens you? N   Is it safe for you to go home?  Raul Montemayor

## 2018-08-13 NOTE — TELEPHONE ENCOUNTER
From: Esdras Juarez  To: Ezra Malloy DO  Sent: 8/11/2018 6:43 PM EDT  Subject: Medication Renewal Request    Original authorizing provider: DO Esdras Murillo would like a refill of the following medications:  amLODIPine (NORVASC) 10 mg tablet Ezra Malloy DO]    [facility]    Comment:  there has been a mix up with my medications. I'm supposed to be taking 1tab of amlodipine and 1/2 tab hydrochlorothiazide however the pharmacy has stated that the prescription calls for the opposite so I've been taking 1 tab of both medications. Now they are saying I can't refill my amlodipine because I should have another months supply.

## 2018-08-13 NOTE — MR AVS SNAPSHOT
[address] [place] [address] [address] [facility] [address] [facility] 57 
[phone] Patient: Robert Webb MRN: [id number] :1980 Visit Information Date & Time Provider Department Dept. Phone Encounter #  
 2018  3:00 PM Aron Buck, [facility] Internal Medicine [phone] [id number] Follow-up Instructions Return in about 6 months (around 2019). Your Appointments 2018 11:15 AM  
PHYSICAL PRE OP with Aron Buck DO [facility] Internal Medicine ([facility]) Appt Note: cpe 37yr $0CP, $0PB eyt 18  
 [address] [facility] [address] [facility] [address] [place] [address] [facility] [address]  
[phone]  
  
   
 [facility] [facility] [address] Upcoming Health Maintenance Date Due Pneumococcal 19-64 Medium Risk (1 of 1 - PPSV23) 1997 DTaP/Tdap/Td series (1 - Tdap) 1999 Influenza Age 5 to Adult 2018 PAP AKA CERVICAL CYTOLOGY 2021 Allergies as of 2018  Review Complete On: 2018 By: Aron Buck DO No Known Allergies Current Immunizations  Never Reviewed No immunizations on file. Not reviewed this visit You Were Diagnosed With   
  
 Codes Comments Neck mass    -  Primary ICD-10-CM: R22.1 ICD-9-CM: 784.2 left Lipoma of neck     ICD-10-CM: D17.0 ICD-9-CM: 214.1 Left Essential hypertension     ICD-10-CM: I10 
ICD-9-CM: 401.9 Severe obesity (BMI 35.0-39. 9) with comorbidity ([address]     ICD-10-CM: E66.01 
ICD-9-CM: 278.01 Vitals BP Pulse Temp Resp Height(growth percentile) Weight(growth percentile) 118/81 (BP 1 Location: Left arm, BP Patient Position: Sitting) 74 98.5 °F (36.9 °C) (Oral) 16 5' 11\" (1.803 m) 263 lb (119.3 kg) SpO2 BMI OB Status Smoking Status 99% 36.68 kg/m2 Having regular periods Current Every Day Smoker Vitals History BMI and BSA Data  Body Mass Index Body Surface Area  
 36.68 kg/m 2 2.44 m 2  
  
  
 Preferred Pharmacy Pharmacy Name Phone [address] [phone] Your Updated Medication List  
  
   
This list is accurate as of 8/13/18  4:03 PM.  Always use your most recent med list.  
  
  
  
  
 acetaminophen 500 mg tablet Commonly known as:  TYLENOL Take 2 Tabs by mouth every six (6) hours as needed for Pain. amLODIPine 10 mg tablet Commonly known as:  Valentin Presser Take 1 Tab by mouth daily. hydroCHLOROthiazide 25 mg tablet Commonly known as:  HYDRODIURIL Take 1 Tab by mouth daily. traZODone 50 mg tablet Commonly known as:  Junella Lee Take 1 Tab by mouth nightly. Prescriptions Sent to Pharmacy Refills  
 amLODIPine (NORVASC) 10 mg tablet 1 Sig: Take 1 Tab by mouth daily. Class: Normal  
 Pharmacy: [facility] [address] [address] [address] [place] [address] #: [phone] Route: Oral  
  
Follow-up Instructions Return in about 6 months (around 2/13/2019). Introducing [facility]! Dear Audrey Castro: 
Thank you for requesting a [employer] account. Our records indicate that you already have an active [employer] account. You can access your account anytime at [email or web address] Did you know that you can access your hospital and ER discharge instructions at any time in [employer]? You can also review all of your test results from your hospital stay or ER visit. Additional Information If you have questions, please visit the Frequently Asked Questions section of the [employer] website at [email or web address] Remember, [employer] is NOT to be used for urgent needs. For medical emergencies, dial 911. Now available from your iPhone and Android! Please provide this summary of care documentation to your next provider. Your primary care clinician is listed as James Garcia.  If you have any questions after today's visit, please call [phone].

## 2018-08-29 NOTE — PROGRESS NOTES
HISTORY OF PRESENT ILLNESS  Олег Cooper is a 42 y.o. female. Pt. comes in for f/u. Has multiple medical problems. Reports having mass in the left side of her neck. No pain or other related symptoms. Her GI issues have resolved nicely after treating H pylori. BP is stable. Reports compliance with medications and diet. Med list and most recent labs/studies reviewed with pt. she is very obese. Trying to be active physically to control weight. Needs med refills. No tobacco or alcohol use. Reports no other new c/o. Other   Pertinent negatives include no chest pain, no abdominal pain, no headaches and no shortness of breath. Hypertension    Pertinent negatives include no chest pain, no headaches, no neck pain, no dizziness and no shortness of breath. Obesity   Pertinent negatives include no chest pain, no abdominal pain, no headaches and no shortness of breath. Review of Systems   Constitutional: Negative. HENT: Negative. Negative for sore throat. Eyes: Negative. Respiratory: Negative for shortness of breath and stridor. Cardiovascular: Negative for chest pain and leg swelling. Gastrointestinal: Negative for abdominal pain. Genitourinary: Negative for dysuria. Musculoskeletal: Negative for joint pain and neck pain. Skin: Negative. Neurological: Negative for dizziness and headaches. Psychiatric/Behavioral: Negative for depression. The patient has insomnia. The patient is not nervous/anxious. All other systems reviewed and are negative. Physical Exam   Constitutional: She is oriented to person, place, and time. She appears well-developed and well-nourished. No distress. Pleasant lady  Obese   HENT:   Head: Normocephalic and atraumatic. Mouth/Throat: Oropharynx is clear and moist.   Eyes: Conjunctivae are normal.   Neck: Normal range of motion. Neck supple. No JVD present. No tracheal deviation present. No thyromegaly present.    Soft nontender mass C/W lipoma Cardiovascular: Normal rate, regular rhythm, normal heart sounds and intact distal pulses. No murmur heard. Pulmonary/Chest: Effort normal and breath sounds normal. No respiratory distress. She has no wheezes. She has no rales. Abdominal: Soft. Bowel sounds are normal. She exhibits no distension. There is no tenderness. Musculoskeletal: She exhibits no tenderness. Lymphadenopathy:     She has no cervical adenopathy. Neurological: She is alert and oriented to person, place, and time. Coordination normal.   Skin: Skin is warm and dry. No rash noted. Psychiatric: She has a normal mood and affect. Her behavior is normal.   Nursing note and vitals reviewed. ASSESSMENT and PLAN  Diagnoses and all orders for this visit:    1. Neck mass  Comments:  left      2. Lipoma of neck  Comments:  Left    3. Essential hypertension    4. Severe obesity (BMI 35.0-39. 9) with comorbidity ([address]    Other orders  -     amLODIPine (NORVASC) 10 mg tablet; Take 1 Tab by mouth daily. Follow-up Disposition:  Return in about 6 months (around 2/13/2019).    lab results and schedule of future lab studies reviewed with patient  reviewed diet, exercise and weight control  reviewed medications and side effects in detail  Reassurance that everything seems okay  Advised patient to notify us if the mass is getting bigger or starts causing symptoms  Explained etiology and treatment of lipoma with patient

## 2018-09-24 ENCOUNTER — OFFICE VISIT (OUTPATIENT)
Dept: INTERNAL MEDICINE CLINIC | Age: 38
End: 2018-09-24

## 2018-09-24 VITALS
SYSTOLIC BLOOD PRESSURE: 129 MMHG | BODY MASS INDEX: 37.66 KG/M2 | HEART RATE: 70 BPM | WEIGHT: 269 LBS | RESPIRATION RATE: 20 BRPM | OXYGEN SATURATION: 99 % | HEIGHT: 71 IN | TEMPERATURE: 97.6 F | DIASTOLIC BLOOD PRESSURE: 75 MMHG

## 2018-09-24 DIAGNOSIS — S53.402A SPRAIN OF LEFT UPPER ARM, INITIAL ENCOUNTER: ICD-10-CM

## 2018-09-24 DIAGNOSIS — R22.2 SUPRACLAVICULAR MASS: ICD-10-CM

## 2018-09-24 DIAGNOSIS — E66.9 OBESITY (BMI 35.0-39.9 WITHOUT COMORBIDITY): ICD-10-CM

## 2018-09-24 DIAGNOSIS — I10 ESSENTIAL HYPERTENSION: ICD-10-CM

## 2018-09-24 DIAGNOSIS — S13.9XXA NECK SPRAIN, INITIAL ENCOUNTER: ICD-10-CM

## 2018-09-24 DIAGNOSIS — S23.9XXA THORACIC BACK SPRAIN, INITIAL ENCOUNTER: ICD-10-CM

## 2018-09-24 DIAGNOSIS — S30.1XXA CONTUSION OF ABDOMINAL WALL, INITIAL ENCOUNTER: ICD-10-CM
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
## 2018-09-24 RX ORDER — TRAMADOL HYDROCHLORIDE 50 MG/1
50 TABLET ORAL
Qty: 20 TAB | Refills: 0 | Status: SHIPPED | OUTPATIENT
Start: 2018-09-24 | End: 2019-03-22 | Stop reason: ALTCHOICE
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
## 2018-09-24 NOTE — LETTER
NOTIFICATION RETURN TO WORK / SCHOOL 
 
9/24/2018 11:59 AM 
 
Ms. Abhijit Lorenzo [address] To Whom It May Concern: 
 
Abhijit Lorenzo is currently under the care of [address]. She will return to work/school on: October 1, 2018 If there are questions or concerns please have the patient contact our office.  
 
 
 
Sincerely, 
 
 
Pérez Umaña, DO
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
## 2018-09-27 DIAGNOSIS — R22.1 NECK MASS: Primary | ICD-10-CM

## 2018-09-28 ENCOUNTER — OFFICE VISIT (OUTPATIENT)
Dept: OBGYN CLINIC | Age: 38
End: 2018-09-28

## 2018-09-28 ENCOUNTER — HOSPITAL ENCOUNTER (OUTPATIENT)
Dept: ULTRASOUND IMAGING | Age: 38
Discharge: HOME | End: 2018-09-28
Attending: INTERNAL MEDICINE
Payer: MEDICAID

## 2018-09-28 VITALS
HEIGHT: 71 IN | BODY MASS INDEX: 37.52 KG/M2 | HEART RATE: 70 BPM | SYSTOLIC BLOOD PRESSURE: 123 MMHG | WEIGHT: 268 LBS | DIASTOLIC BLOOD PRESSURE: 75 MMHG | TEMPERATURE: 97.7 F | RESPIRATION RATE: 16 BRPM

## 2018-09-28 DIAGNOSIS — N39.3 STRESS INCONTINENCE: ICD-10-CM

## 2018-09-28 DIAGNOSIS — R22.1 NECK MASS: ICD-10-CM

## 2018-09-28 DIAGNOSIS — N89.8 VAGINAL DISCHARGE: Primary | ICD-10-CM

## 2018-09-28 PROCEDURE — 76536 US EXAM OF HEAD AND NECK: CPT

## 2018-09-28 NOTE — MR AVS SNAPSHOT
[address] [facility] [address] [place] [address] 
[phone] Patient: Nanci Piña MRN: [id number] :[id number] Visit Information Date & Time Provider Department Dept. Phone Encounter #  
 2018 11:00 AM Alexandra Prabhakar MD [address] O[facility]YN AT [address] [facility] [id number] Follow-up Instructions Return in about 1 year (around 2019), or if symptoms worsen or fail to improve. Your Appointments 10/1/2018 10:00 AM  
ROUTINE CARE with Jesus Krause [facility] Internal Medicine ([facility]) Appt Note: 1 week follow up [address] [facility] [address] [place] [address]  
[phone]  
  
   
 [address] [facility] [address]  
  
    
 2019 10:00 AM  
ROUTINE CARE with Jesus Krause DO [facility] Internal Medicine ([facility]) Appt Note: 6 mo f/U  
 200 [facility] [address] [place] [address]  
[phone] Upcoming Health Maintenance Date Due Influenza Age 5 to Adult 2018 PAP AKA CERVICAL CYTOLOGY 2021 Allergies as of 2018  Review Complete On: 2018 By: Alexandra Prabhakar MD  
 No Known Allergies Current Immunizations  Reviewed on 2018 No immunizations on file. Not reviewed this visit You Were Diagnosed With   
  
 Codes Comments Vaginal discharge    -  Primary ICD-10-CM: N89.8 ICD-9-CM: 623.5 Stress incontinence     ICD-10-CM: N39.3 ICD-9-CM: [id number] Vitals BP Pulse Temp Resp Height(growth percentile) Weight(growth percentile) 123/75 70 97.7 °F (36.5 °C) (Oral) 16 5' 11\" (1.803 m) 268 lb (121.6 kg) LMP BMI OB Status Smoking Status 2018 (Approximate) 37.38 kg/m2 Having regular periods Current Every Day Smoker Vitals History BMI and BSA Data Body Mass Index Body Surface Area  
 37.38 kg/m 2 2.47 m 2 Preferred Pharmacy Pharmacy Name Phone [address] [phone] Your Updated Medication List  
  
   
This list is accurate as of 9/28/18 11:11 AM.  Always use your most recent med list.  
  
  
  
  
 acetaminophen 500 mg tablet Commonly known as:  TYLENOL Take 2 Tabs by mouth every six (6) hours as needed for Pain. amLODIPine 10 mg tablet Commonly known as:  Ari Mend Take 1 Tab by mouth daily. hydroCHLOROthiazide 25 mg tablet Commonly known as:  HYDRODIURIL Take 1 Tab by mouth daily. traMADol 50 mg tablet Commonly known as:  ULTRAM  
Take 1 Tab by mouth two (2) times daily as needed for Pain. Max Daily Amount: 100 mg.  
  
 traZODone 50 mg tablet Commonly known as:  Rosette Cast Take 1 Tab by mouth nightly. Follow-up Instructions Return in about 1 year (around 9/28/2019), or if symptoms worsen or fail to improve. Introducing [facility]! Dear Sari Baez: 
Thank you for requesting a rPath account. Our records indicate that you already have an active rPath account. You can access your account anytime at [email or web address] Did you know that you can access your hospital and ER discharge instructions at any time in rPath? You can also review all of your test results from your hospital stay or ER visit. Additional Information If you have questions, please visit the Frequently Asked Questions section of the rPath website at [email or web address] Remember, rPath is NOT to be used for urgent needs. For medical emergencies, dial 911. Now available from your iPhone and Android! Please provide this summary of care documentation to your next provider. Your primary care clinician is listed as Pavel Fried. If you have any questions after today's visit, please call [phone].

## 2018-09-28 NOTE — PROGRESS NOTES
Pt is here for evaluation of vaginal discharge. It is clear, it is a lot of it, sometimes it feels like she has urinated on herself, no itching, no smell, for 2 weeks.

## 2018-09-28 NOTE — PROGRESS NOTES
Vaginitis evaluation    Chief Complaint   Vaginal Discharge      HPI   42 y.o. female complains of clear to white vaginal discharge for 14 days. Patient's last menstrual period was 09/08/2018 (approximate). A lot of discharge. No odor. No itching. No pain in pelvis. Also with leakage from bladder. No pain with urination. Worse with coughing or sneezing or laughing. Has been going on for a while, but increased recently. She denies additional symptoms at this time. The patient denies aggravating factors. She is not concerned about possible STI exposure at this time. She denies exposure to new chemicals ot hygenic agents  Previous treatment included: none    Most recent cycle was light, so she is holding off on Mirena. Past Medical History:   Diagnosis Date    Gastritis, Helicobacter pylori     Hypertension     Obesity      Past Surgical History:   Procedure Laterality Date    HX TUBAL LIGATION  2000    LAP,TUBAL CAUTERY  2000     Social History     Occupational History    Not on file. Social History Main Topics    Smoking status: Current Every Day Smoker     Packs/day: 0.25    Smokeless tobacco: Never Used    Alcohol use Yes      Comment: occ    Drug use: No    Sexual activity: Not Currently     Birth control/ protection: Surgical     Family History   Problem Relation Age of Onset    Hypertension Mother     Hypertension Sister         No Known Allergies  Prior to Admission medications    Medication Sig Start Date End Date Taking? Authorizing Provider   traMADol (ULTRAM) 50 mg tablet Take 1 Tab by mouth two (2) times daily as needed for Pain. Max Daily Amount: 100 mg. 9/24/18  Yes Sal Parikh DO   amLODIPine (NORVASC) 10 mg tablet Take 1 Tab by mouth daily. 8/13/18  Yes Sal Parikh DO   hydroCHLOROthiazide (HYDRODIURIL) 25 mg tablet Take 1 Tab by mouth daily. 7/12/18  Yes Sal Parikh DO   traZODone (DESYREL) 50 mg tablet Take 1 Tab by mouth nightly.  6/11/18  Yes Clydie Sever, DO   acetaminophen (TYLENOL) 500 mg tablet Take 2 Tabs by mouth every six (6) hours as needed for Pain.  7/10/18   Halie Alvarez PA-C                      Review of Systems - History obtained from the patient  Constitutional: negative for weight loss, fever, night sweats  Breast: negative for breast lumps, nipple discharge, galactorrhea  GI: negative for change in bowel habits, abdominal pain, black or bloody stools  : negative for frequency, dysuria, hematuria  MSK: negative for back pain, joint pain, muscle pain  Skin: negative for itching, rash, hives  Neuro: negative for dizziness, headache, confusion, weakness  Psych: negative for anxiety, depression, change in mood  Heme/lymph: negative for bleeding, bruising, pallor       Objective:    Visit Vitals    /75    Pulse 70    Temp 97.7 °F (36.5 °C) (Oral)    Resp 16    Ht 5' 11\" (1.803 m)    Wt 268 lb (121.6 kg)    LMP 09/08/2018 (Approximate)    BMI 37.38 kg/m2       Physical Exam:   PHYSICAL EXAMINATION    Constitutional  · Appearance: well-nourished, well developed, alert, in no acute distress    HENT  · Head and Face: appears normal    Genitourinary  · External Genitalia: normal appearance for age, no discharge present, no tenderness present, no inflammatory lesions present, no masses present, no atrophy present  · Vagina:  moderate discharge present, otherwise normal vaginal vault without central or paravaginal defects, no inflammatory lesions present, no masses present  · Bladder: non-tender to palpation  · Urethra: appears normal  · Cervix: normal   · Uterus: normal size, shape and consistency  · Adnexa: no adnexal tenderness present, no adnexal masses present  · Perineum: perineum within normal limits, no evidence of trauma, no rashes or skin lesions present  · Anus: anus within normal limits, no hemorrhoids present  · Inguinal Lymph Nodes: no lymphadenopathy present    Skin  · General Inspection: no rash, no lesions identified    Neurologic/Psychiatric  · Mental Status:  · Orientation: grossly oriented to person, place and time  · Mood and Affect: mood normal, affect appropriate          No results found for any visits on 09/28/18. Assessment:   42 y.o. with vaginal discharge and MONICA    Plan:   - nuswab for discharge  - will refer to pelvic PT    ROV prn if symptoms persist or worsen.

## 2018-10-01 ENCOUNTER — OFFICE VISIT (OUTPATIENT)
Dept: INTERNAL MEDICINE CLINIC | Age: 38
End: 2018-10-01

## 2018-10-01 VITALS
DIASTOLIC BLOOD PRESSURE: 77 MMHG | BODY MASS INDEX: 37.66 KG/M2 | OXYGEN SATURATION: 100 % | TEMPERATURE: 98 F | HEART RATE: 74 BPM | RESPIRATION RATE: 18 BRPM | SYSTOLIC BLOOD PRESSURE: 121 MMHG | HEIGHT: 71 IN | WEIGHT: 269 LBS

## 2018-10-01 DIAGNOSIS — I10 ESSENTIAL HYPERTENSION: ICD-10-CM

## 2018-10-01 DIAGNOSIS — E66.9 OBESITY (BMI 35.0-39.9 WITHOUT COMORBIDITY): ICD-10-CM

## 2018-10-01 DIAGNOSIS — D17.0 LIPOMA OF NECK: ICD-10-CM

## 2018-10-01 DIAGNOSIS — M54.2 NECK PAIN: ICD-10-CM
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
## 2018-10-01 DIAGNOSIS — E66.01 SEVERE OBESITY (BMI 35.0-39.9) WITH COMORBIDITY (HCC): ICD-10-CM

## 2018-10-01 DIAGNOSIS — M54.50 MIDLINE LOW BACK PAIN WITHOUT SCIATICA, UNSPECIFIED CHRONICITY: Primary | ICD-10-CM

## 2018-10-01 PROBLEM — R22.2 SUPRACLAVICULAR MASS: Status: RESOLVED | Noted: 2018-09-24 | Resolved: 2018-10-01

## 2018-10-01 RX ORDER — DICLOFENAC SODIUM 75 MG/1
75 TABLET, DELAYED RELEASE ORAL 2 TIMES DAILY
Qty: 30 TAB | Refills: 0 | Status: SHIPPED | OUTPATIENT
Start: 2018-10-01 | End: 2019-04-16

## 2018-10-01 RX ORDER — PREDNISONE 20 MG/1
20 TABLET ORAL
Qty: 5 TAB | Refills: 0 | Status: SHIPPED | OUTPATIENT
Start: 2018-10-01 | End: 2018-10-06

## 2018-10-01 NOTE — LETTER
NOTIFICATION RETURN TO WORK / SCHOOL 
 
10/1/2018 10:30 AM 
 
Ms. Darvin Langley [address] To Whom It May Concern: 
 
Darvin Langley is currently under the care of [address]. She will return to work/school on: 10/08/2018 If there are questions or concerns please have the patient contact our office.  
 
 
 
Sincerely, 
 
 
Gena Anderson, DO
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
## 2018-10-03 RX ORDER — METRONIDAZOLE 500 MG/1
500 TABLET ORAL 2 TIMES DAILY
Qty: 14 TAB | Refills: 0 | Status: SHIPPED | OUTPATIENT
Start: 2018-10-03 | End: 2018-10-10

## 2018-10-05 ENCOUNTER — TELEPHONE (OUTPATIENT)
Dept: INTERNAL MEDICINE CLINIC | Age: 38
End: 2018-10-05

## 2018-10-05 NOTE — TELEPHONE ENCOUNTER
The pt called again requesting medication for pain. PEr Blas Correia NP the pt may not have any scheduled pain medication. Advised the pt to alternate Diclofenac as prescribed with Tylenol. The pt asked if she could take Aleve. Told her that if she does, she CANNOT take it with Diclofenac as they are both NSAIDs. The pt voiced thanks and understanding.
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
## 2018-10-16 ENCOUNTER — HOSPITAL ENCOUNTER (OUTPATIENT)
Dept: PHYSICAL THERAPY | Age: 38
Discharge: HOME | End: 2018-10-16
Payer: MEDICAID

## 2018-10-16 PROCEDURE — 97161 PT EVAL LOW COMPLEX 20 MIN: CPT

## 2018-10-16 PROCEDURE — G8978 MOBILITY CURRENT STATUS: HCPCS

## 2018-10-16 PROCEDURE — 97110 THERAPEUTIC EXERCISES: CPT

## 2018-10-16 PROCEDURE — G8979 MOBILITY GOAL STATUS: HCPCS
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
## 2018-10-23 ENCOUNTER — HOSPITAL ENCOUNTER (OUTPATIENT)
Dept: PHYSICAL THERAPY | Age: 38
Discharge: HOME | End: 2018-10-23
Payer: MEDICAID

## 2018-10-23 ENCOUNTER — OFFICE VISIT (OUTPATIENT)
Dept: INTERNAL MEDICINE CLINIC | Age: 38
End: 2018-10-23

## 2018-10-23 VITALS
SYSTOLIC BLOOD PRESSURE: 116 MMHG | BODY MASS INDEX: 37.99 KG/M2 | DIASTOLIC BLOOD PRESSURE: 70 MMHG | TEMPERATURE: 97.8 F | OXYGEN SATURATION: 97 % | RESPIRATION RATE: 16 BRPM | HEIGHT: 71 IN | WEIGHT: 271.4 LBS | HEART RATE: 69 BPM

## 2018-10-23 DIAGNOSIS — Z23 ENCOUNTER FOR IMMUNIZATION: ICD-10-CM

## 2018-10-23 DIAGNOSIS — S93.505A SPRAIN OF FOURTH TOE OF LEFT FOOT, INITIAL ENCOUNTER: ICD-10-CM

## 2018-10-23 DIAGNOSIS — I10 ESSENTIAL HYPERTENSION: ICD-10-CM

## 2018-10-23 DIAGNOSIS — E66.9 OBESITY (BMI 35.0-39.9 WITHOUT COMORBIDITY): ICD-10-CM

## 2018-10-23 DIAGNOSIS — H92.03 OTALGIA OF BOTH EARS: Primary | ICD-10-CM

## 2018-10-23 PROCEDURE — 97140 MANUAL THERAPY 1/> REGIONS: CPT

## 2018-10-23 PROCEDURE — 97110 THERAPEUTIC EXERCISES: CPT

## 2018-10-23 NOTE — PROGRESS NOTES
The patient presents for routine flu immunization, denies any symptoms, reactions or allergies that would exclude them from being immunized today. Risks and adverse reactions were discussed and the VIS was given to them. Vaccine administered to right deltoid. All questions were addressed. Patient was observed 15 min post injection. There were no reactions observed. No reactions noted.
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
## 2018-10-23 NOTE — PROGRESS NOTES
_  -         [facility]  [address], [address] [facility]    OUTPATIENT physical Therapy DAILY Treatment NOTe  Visit: 2    NAME: Moiz Steward AGE: 42 y.o. GENDER: female  DATE: 10/23/2018  REFERRING PHYSICIAN: Dominic Bernal DO        GOALS  Short term goals  Time frame: 3 weeks  1. Patient will be compliance and independent with the initial HEP as evidenced by being able to perform without cuing. 2. Patient will report a 50% improvement in symptoms. 3. Patient report a 50% improvement in sleeping. 4. Patient will tolerate 15 minutes of clinic activities before onset of symptoms. Long term goals  Time frame: 6 weeks  1. Patient will reports pain level decreased to 2/10 to allow increased ease of movement. 2. Patient will have an improved score on the Quick DASH outcome measure by 30 points to demonstrate an increase in functional activity tolerance. 3. Patient will be independent in final individualized HEP. 4. Patient will return to work without being limited by symptoms. 5. Patient will sleep 6-8 hours without being interrupted by pain.                 SUBJECTIVE:   It feels a little better  Pain In: back 2/10, left neck 4/10    OBJECTIVE DATA SUMMARY:     Pain:  Location:Low back, left neck and shoulder  Quality: burning  Now: Low back 7/10   Left shoulder 7/10  Best:  Worst:  Factors that improve pain: rest, ice, medication: used and beneficial    Posture:   Mild head forward and rounded shoulders    Strength:   Left UE strength WNL    Range of Motion:   Cx ROM WNL    Lumbar ROM  Trunk flexion 50% of normal  SB B/L 50 % of normal  Extension limited and increases pain    Spinal Assessment:   Increased lumbar lordosis      Joint Mobility:   Cx segments: mobility WNL Distraction and SG no pain    Palpation:   TTP Left UT, levator, rhomboids, supraspinatus and subscapularis    Neurologic Assessment:   Tone: normal   Sensation: WNL   Reflexes: not tested    Special Tests: -empty can  - drop test    Mobility:   Transitional Movements: Antalgic    Gait: WNL    Balance: WNL    Functional Measure: In compliance with CMSs Claims Based Outcome Reporting, the following G-code set was chosen for this patient based on their primary functional limitation being treated: The outcome measure chosen to determine the severity of the functional limitation was the Quick DASH with a score of 38/55 which was correlated with the impairment scale.     ? Mobility - Walking and Moving Around:     - CURRENT STATUS: CL - 60%-79% impaired, limited or restricted    - GOAL STATUS: CI - 1%-19% impaired, limited or restricted    - D/C STATUS:  ---------------To be determined---------------      Physical Therapy Evaluation Charge Determination   History Examination Presentation Decision-Making   LOW Complexity : Zero comorbidities / personal factors that will impact the outcome / POC LOW Complexity : 1-2 Standardized tests and measures addressing body structure, function, activity limitation and / or participation in recreation  LOW Complexity : Stable, uncomplicated  LOW Complexity : FOTO score of       Based on the above components, the patient evaluation is determined to be of the following complexity level: LOW     TREATMENT/INTERVENTION:  Modalities (Rationale): MHP post treatment to decrease pain and muscle guarding      Therapeutic Exercises:  HEP   Shoulder blade squeeze, left UT stretch, left levator stretch, shoulder stretch, left shoulder flexion wall slide, KTC, LTR, Scapular retraction ER      Seated  Shoulder blade squeeze  UT stretch  Levator stretch  Upper thoracic/lower cervical stretch  Flexion stretch over blue ball x 10 reps  Trunk SB x 8 reps B/L      Supine  KTC  10 reps  LTR 10 reps  Scapular retraction ER    Standing  Left shoulder flexion and abduction wall slide x 8 reps  Rows with green TB x 10 reps  Wall push ups x 8 reps      Manual Therapy:  IASTM left UT and Levator. Pt expressed understanding of purpose of procedure and visualiized response,    Neuro Re-Education:  Will discuss lumbar support next visit    Balance Training:  none    Ambulation/Gait Training:  none        Activity tolerance and post treatment pain report: Tolerated well  Pain Out:     Education:  Education was provided to the patient on the following topics: Importance of exercises. [x]    No changes were made to the home exercise program.  []    The following changes were made to the home exercise program:   Patient verbalized understanding of the topics presented. ASSESSMENT:   Pt returns today following IE. She reports decreased back and left shoulder pain, however left neck/shoulder continues at 4/10 pain level. Reviewed HEP with good recall and advanced clinic activities as above. TTP left UT and levator, IASTM to this area, assess response. Advance as tolerated  Patients progression toward goals is as follows:  [x]     Improving appropriately and progressing toward goals  []     Improving slowly and progressing toward goals  []     Not making progress toward goals and plan of care will be adjusted    PLAN OF CARE:   Patient continues to benefit from skilled intervention to address the above impairments. [x]    Continue treatment per established plan of care.   []     Recommend the following changes to the plan of care:     Recommendations/Intent for next treatment: UBE/LBE, Assess response to IASTM, advance exercises    Clifton Puentes, PT   Time Calculation: 40 mins  Patient Time in clinic:   Start Time: 1500   Stop Time: 66 91 21

## 2018-10-23 NOTE — PROGRESS NOTES
Darvin Langley is a 42 y.o. female    Chief Complaint   Patient presents with    Toe Pain     on left foot. Injured it 10/5/18. Still hurts. Thinks possible fracture.  Ear Pain     x 1 week. Bilateral.     1. Have you been to the ER, urgent care clinic since your last visit? Hospitalized since your last visit? No    2. Have you seen or consulted any other health care providers outside of the [address] [place] [address] since your last visit? Include any pap smears or colon screening.  No     Visit Vitals  /70 (BP 1 Location: Left arm, BP Patient Position: Sitting)   Pulse 69   Temp 97.8 °F (36.6 °C) (Oral)   Resp 16   Ht 5' 11\" (1.803 m)   Wt 271 lb 6.4 oz (123.1 kg)   SpO2 97%   BMI 37.85 kg/m²     Health Maintenance Due   Topic Date Due    DTaP/Tdap/Td series (1 - Tdap) 12/06/1999     Felicita Vidal LPN

## 2018-10-24 ENCOUNTER — HOSPITAL ENCOUNTER (EMERGENCY)
Age: 38
Discharge: HOME | End: 2018-10-24
Attending: EMERGENCY MEDICINE
Payer: MEDICAID

## 2018-10-24 VITALS
WEIGHT: 271 LBS | TEMPERATURE: 98.4 F | HEART RATE: 87 BPM | BODY MASS INDEX: 37.94 KG/M2 | DIASTOLIC BLOOD PRESSURE: 80 MMHG | SYSTOLIC BLOOD PRESSURE: 131 MMHG | RESPIRATION RATE: 14 BRPM | OXYGEN SATURATION: 96 % | HEIGHT: 71 IN

## 2018-10-24 DIAGNOSIS — M54.50 ACUTE LEFT-SIDED LOW BACK PAIN WITHOUT SCIATICA: Primary | ICD-10-CM

## 2018-10-24 PROCEDURE — 99283 EMERGENCY DEPT VISIT LOW MDM: CPT

## 2018-10-24 PROCEDURE — 74011250636 HC RX REV CODE- 250/636: Performed by: PHYSICIAN ASSISTANT

## 2018-10-24 PROCEDURE — 74011250637 HC RX REV CODE- 250/637: Performed by: PHYSICIAN ASSISTANT

## 2018-10-24 PROCEDURE — 74011000250 HC RX REV CODE- 250: Performed by: PHYSICIAN ASSISTANT

## 2018-10-24 PROCEDURE — 96372 THER/PROPH/DIAG INJ SC/IM: CPT

## 2018-10-24 RX ORDER — LIDOCAINE 4 G/100G
1 PATCH TOPICAL
Status: DISCONTINUED | OUTPATIENT
Start: 2018-10-24 | End: 2018-10-24 | Stop reason: HOSPADM

## 2018-10-24 RX ORDER — NAPROXEN 500 MG/1
500 TABLET ORAL 2 TIMES DAILY WITH MEALS
Qty: 20 TAB | Refills: 0 | Status: SHIPPED | OUTPATIENT
Start: 2018-10-24 | End: 2019-04-16

## 2018-10-24 RX ORDER — KETOROLAC TROMETHAMINE 30 MG/ML
60 INJECTION, SOLUTION INTRAMUSCULAR; INTRAVENOUS
Status: COMPLETED | OUTPATIENT
Start: 2018-10-24 | End: 2018-10-24

## 2018-10-24 RX ORDER — CYCLOBENZAPRINE HCL 10 MG
10 TABLET ORAL
Status: COMPLETED | OUTPATIENT
Start: 2018-10-24 | End: 2018-10-24

## 2018-10-24 RX ORDER — LIDOCAINE 4 G/100G
PATCH TOPICAL
Qty: 10 PATCH | Refills: 0 | Status: SHIPPED | OUTPATIENT
Start: 2018-10-24 | End: 2019-03-22 | Stop reason: ALTCHOICE

## 2018-10-24 RX ORDER — METHOCARBAMOL 500 MG/1
500 TABLET, FILM COATED ORAL 4 TIMES DAILY
Qty: 30 TAB | Refills: 0 | Status: SHIPPED | OUTPATIENT
Start: 2018-10-24 | End: 2018-10-25

## 2018-10-24 RX ADMIN — KETOROLAC TROMETHAMINE 60 MG: 30 INJECTION, SOLUTION INTRAMUSCULAR; INTRAVENOUS at 12:57

## 2018-10-24 RX ADMIN — CYCLOBENZAPRINE 10 MG: 10 TABLET, FILM COATED ORAL at 12:57

## 2018-10-24 NOTE — ED NOTES
Emergency Department Nursing Plan of Care       The Nursing Plan of Care is developed from the Nursing assessment and Emergency Department Attending provider initial evaluation. The plan of care may be reviewed in the ED Provider note.     The Plan of Care was developed with the following considerations:   Patient / Family readiness to learn indicated by:verbalized understanding  Persons(s) to be included in education: patient  Barriers to Learning/Limitations:No    [address] [facility]    10/24/2018   12:48 PM

## 2018-10-24 NOTE — LETTER
[facility] EMERGENCY DEPT 
[address] [facility] [id number] [phone] Work/School Note Date: 10/24/2018 To Whom It May concern: 
 
Sandhya Roblero was seen and treated today in the emergency room by the following provider(s): 
Attending Provider: Amelia Amos MD 
Physician Assistant: BUSHRA Valiente. Sandhya Roblero may return to work on 10/26/18. Sincerely, BUSHRA Law

## 2018-10-24 NOTE — ED NOTES
..Patient has been instructed that they have been given flexeril* which contains opioids, benzodiazepines, or other sedating drugs. Patient is aware that they  will need to refrain from driving or operating heavy machinery after taking this medication. Patient also instructed that they need to avoid drinking alcohol and using other products containing opioids, benzodiazepines, or other sedating drugs. Patient verbalized understanding. .. Geena Wong Discharge summary and discharge medications reviewed with patient and appropriate educational materials and side effects teaching were provided. patient  Given 0 paper prescriptions and 3 electronic prescriptions sent to pt's listed pharmacy. Patient (s) verbalized understanding of the importance of discussing medications with his or her physician or clinic they will be following up with. No si/s of acute distress prior to discharge. Patient offered wheelchair from treatment area to hospital entrance, patient accepted wheelchair. Given work excuse note. C/o 10/10 lower back pain, nonverbal pain of 1/10, tolerable for discharge per pt.
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
## 2018-10-24 NOTE — DISCHARGE INSTRUCTIONS
Learning About Relief for Back Pain  What is back tension and strain? Back strain happens when you overstretch, or pull, a muscle in your back. You may hurt your back in an accident or when you exercise or lift something. Most back pain will get better with rest and time. You can take care of yourself at home to help your back heal.  What can you do first to relieve back pain? When you first feel back pain, try these steps:  · Walk. Take a short walk (10 to 20 minutes) on a level surface (no slopes, hills, or stairs) every 2 to 3 hours. Walk only distances you can manage without pain, especially leg pain. · Relax. Find a comfortable position for rest. Some people are comfortable on the floor or a medium-firm bed with a small pillow under their head and another under their knees. Some people prefer to lie on their side with a pillow between their knees. Don't stay in one position for too long. · Try heat or ice. Try using a heating pad on a low or medium setting, or take a warm shower, for 15 to 20 minutes every 2 to 3 hours. Or you can buy single-use heat wraps that last up to 8 hours. You can also try an ice pack for 10 to 15 minutes every 2 to 3 hours. You can use an ice pack or a bag of frozen vegetables wrapped in a thin towel. There is not strong evidence that either heat or ice will help, but you can try them to see if they help. You may also want to try switching between heat and cold. · Take pain medicine exactly as directed. ¨ If the doctor gave you a prescription medicine for pain, take it as prescribed. ¨ If you are not taking a prescription pain medicine, ask your doctor if you can take an over-the-counter medicine. What else can you do? · Stretch and exercise. Exercises that increase flexibility may relieve your pain and make it easier for your muscles to keep your spine in a good, neutral position. And don't forget to keep walking. · Do self-massage.  You can use self-massage to unwind after work or school or to energize yourself in the morning. You can easily massage your feet, hands, or neck. Self-massage works best if you are in comfortable clothes and are sitting or lying in a comfortable position. Use oil or lotion to massage bare skin. · Reduce stress. Back pain can lead to a vicious [place]: Distress about the pain tenses the muscles in your back, which in turn causes more pain. Learn how to relax your mind and your muscles to lower your stress. Where can you learn more? Go to http://tanisha-verito.info/. Enter G183 in the search box to learn more about \"Learning About Relief for Back Pain. \"  Current as of: March 21, 2017  Content Version: 11.5  © 7002-0427 Healthwise, [employer]. Care instructions adapted under license by [employer] (which disclaims liability or warranty for this information). If you have questions about a medical condition or this instruction, always ask your healthcare professional. [place] [address] any warranty or liability for your use of this information.

## 2018-10-25 ENCOUNTER — HOSPITAL ENCOUNTER (OUTPATIENT)
Dept: PHYSICAL THERAPY | Age: 38
Discharge: HOME | End: 2018-10-25
Payer: MEDICAID

## 2018-10-25 ENCOUNTER — TELEPHONE (OUTPATIENT)
Dept: INTERNAL MEDICINE CLINIC | Age: 38
End: 2018-10-25

## 2018-10-25 RX ORDER — METHOCARBAMOL 500 MG/1
500 TABLET, FILM COATED ORAL 3 TIMES DAILY
Qty: 15 TAB | Refills: 0 | Status: SHIPPED | OUTPATIENT
Start: 2018-10-25 | End: 2019-03-22 | Stop reason: ALTCHOICE

## 2018-10-25 NOTE — PROGRESS NOTES
[facility]  [address], [address] [facility]    OUTPATIENT physical Therapy      10/25/2018:  Vani Del Castillo was not seen on this date for physical therapy for the following reasons:    [x]     Patient called to cancel the visit for the following reasons: increased pain  []     Patient missed the visit and did not call to cancel.     Monique Campa, PT

## 2018-10-25 NOTE — TELEPHONE ENCOUNTER
Called patient, name &  verified, pt stated she injured her back during physical therapy and had to go to ER who advised she to follow up w/PCP. Pt was scheduled an appt for F/U. Discussed w/Jl patients concerns.

## 2018-10-25 NOTE — TELEPHONE ENCOUNTER
Pt went to [facility] [place] ER for back pain yesterday. They wanted her to f/u abdiaziz Bar. Patient thinks she needs a referral to an orthopaedic. Pateint instructed to stop physical therapy- thinks this is what caused pain relapse.

## 2018-11-06 ENCOUNTER — HOSPITAL ENCOUNTER (OUTPATIENT)
Dept: PHYSICAL THERAPY | Age: 38
Discharge: HOME | End: 2018-11-06

## 2018-11-06 NOTE — PROGRESS NOTES
[facility]  [address], [address] [facility]    OUTPATIENT physical Therapy      11/6/2018:  Yesi Bhandari was not seen on this date for physical therapy for the following reasons:    [x]     Patient called to cancel the visit for the following reasons: insurance issue  []     Patient missed the visit and did not call to cancel.     Faith Gasca, PT

## 2018-11-08 ENCOUNTER — HOSPITAL ENCOUNTER (OUTPATIENT)
Dept: PHYSICAL THERAPY | Age: 38
Discharge: HOME | End: 2018-11-08

## 2018-11-08 NOTE — PROGRESS NOTES
[facility]  [address] [facility]    OUTPATIENT physical Therapy      11/8/2018:  Kimberley Juarez was not seen on this date for physical therapy for the following reasons:    [x]     Patient called to cancel the visit for the following reasons: insurance  []     Patient missed the visit and did not call to cancel.     Amy López, PT

## 2018-12-04 NOTE — PROGRESS NOTES
[facility]  [address], [address] [facility]    OUTPATIENT physical Therapy discharge note      12/4/2018:  Patient will be discharged from physical therapy at this time. Criteria for termination of care:    []           Patient has plateaued  [x]           Patient has not returned to therapy  []           Patient has missed three or more visits without prior notification  []           Other:     Patient was seen for 2 visits from 10/16/2018 to 10/23/2018. Please refer to the most recent progress note for the latest PT info available. If you need anything further faxed to you, please contact us at [phone].     Thank you for this referral.  Jameson Harper, PT

## 2019-01-08 ENCOUNTER — TELEPHONE (OUTPATIENT)
Dept: INTERNAL MEDICINE CLINIC | Age: 39
End: 2019-01-08

## 2019-01-08 NOTE — TELEPHONE ENCOUNTER
----- Message from James Head sent at 1/8/2019  8:40 AM EST -----  Regarding: /Telephone  Pt is requesting a copy of her last TB test and flu shot. Pt is requesting a call when information is ready pickup.     Best [phone]

## 2019-01-08 NOTE — TELEPHONE ENCOUNTER
Call placed to pt made aware that I have printed off record of last flu vaccine, but we have no record of TB test, pt requested that flu vac info be faxed to [phone]

## 2019-01-14 RX ORDER — HYDROCHLOROTHIAZIDE 25 MG/1
25 TABLET ORAL DAILY
Qty: 30 TAB | Refills: 5 | Status: SHIPPED | OUTPATIENT
Start: 2019-01-14 | End: 2019-09-04 | Stop reason: SDUPTHER

## 2019-02-08 RX ORDER — AMLODIPINE BESYLATE 10 MG/1
TABLET ORAL
Qty: 90 TAB | Refills: 1 | Status: SHIPPED | OUTPATIENT
Start: 2019-02-08 | End: 2019-02-11 | Stop reason: SDUPTHER

## 2019-02-11 RX ORDER — AMLODIPINE BESYLATE 10 MG/1
10 TABLET ORAL DAILY
Qty: 90 TAB | Refills: 1 | Status: SHIPPED | OUTPATIENT
Start: 2019-02-11 | End: 2019-09-04 | Stop reason: SDUPTHER

## 2019-03-21 ENCOUNTER — OFFICE VISIT (OUTPATIENT)
Dept: INTERNAL MEDICINE CLINIC | Age: 39
End: 2019-03-21

## 2019-03-21 ENCOUNTER — HOSPITAL ENCOUNTER (OUTPATIENT)
Dept: GENERAL RADIOLOGY | Age: 39
Discharge: HOME | End: 2019-03-21
Payer: COMMERCIAL

## 2019-03-21 ENCOUNTER — OFFICE VISIT (OUTPATIENT)
Dept: OBGYN CLINIC | Age: 39
End: 2019-03-21

## 2019-03-21 VITALS
SYSTOLIC BLOOD PRESSURE: 130 MMHG | HEIGHT: 71 IN | DIASTOLIC BLOOD PRESSURE: 80 MMHG | HEART RATE: 72 BPM | WEIGHT: 280 LBS | BODY MASS INDEX: 39.2 KG/M2

## 2019-03-21 VITALS
SYSTOLIC BLOOD PRESSURE: 121 MMHG | RESPIRATION RATE: 16 BRPM | BODY MASS INDEX: 39.2 KG/M2 | HEART RATE: 73 BPM | TEMPERATURE: 97.8 F | HEIGHT: 71 IN | OXYGEN SATURATION: 97 % | DIASTOLIC BLOOD PRESSURE: 85 MMHG | WEIGHT: 280 LBS

## 2019-03-21 DIAGNOSIS — R19.7 DIARRHEA, UNSPECIFIED TYPE: ICD-10-CM

## 2019-03-21 DIAGNOSIS — E66.9 OBESITY (BMI 35.0-39.9 WITHOUT COMORBIDITY): ICD-10-CM

## 2019-03-21 DIAGNOSIS — R82.90 FOUL SMELLING URINE: ICD-10-CM

## 2019-03-21 DIAGNOSIS — E66.9 CLASS 2 OBESITY WITH BODY MASS INDEX (BMI) OF 39.0 TO 39.9 IN ADULT, UNSPECIFIED OBESITY TYPE, UNSPECIFIED WHETHER SERIOUS COMORBIDITY PRESENT: ICD-10-CM

## 2019-03-21 DIAGNOSIS — M25.562 ACUTE PAIN OF BOTH KNEES: ICD-10-CM

## 2019-03-21 DIAGNOSIS — M25.561 ACUTE PAIN OF BOTH KNEES: ICD-10-CM

## 2019-03-21 DIAGNOSIS — N89.8 VAGINAL DISCHARGE: Primary | ICD-10-CM

## 2019-03-21 DIAGNOSIS — M25.561 ACUTE PAIN OF BOTH KNEES: Primary | ICD-10-CM

## 2019-03-21 DIAGNOSIS — M25.562 ACUTE PAIN OF BOTH KNEES: Primary | ICD-10-CM

## 2019-03-21 DIAGNOSIS — L98.9 SKIN PROBLEM: ICD-10-CM

## 2019-03-21 LAB
BILIRUB UR QL STRIP: NORMAL
GLUCOSE UR-MCNC: NEGATIVE MG/DL
KETONES P FAST UR STRIP-MCNC: NEGATIVE MG/DL
PH UR STRIP: 6 [PH] (ref 4.6–8)
PROT UR QL STRIP: NORMAL
SP GR UR STRIP: 1.03 (ref 1–1.03)
UA UROBILINOGEN AMB POC: NORMAL (ref 0.2–1)
URINALYSIS CLARITY POC: CLEAR
URINALYSIS COLOR POC: YELLOW
URINE BLOOD POC: NEGATIVE
URINE LEUKOCYTES POC: NEGATIVE
URINE NITRITES POC: NEGATIVE

## 2019-03-21 PROCEDURE — 73562 X-RAY EXAM OF KNEE 3: CPT

## 2019-03-21 RX ORDER — CLOTRIMAZOLE AND BETAMETHASONE DIPROPIONATE 10; .64 MG/G; MG/G
CREAM TOPICAL 2 TIMES DAILY
Qty: 15 G | Refills: 0 | Status: SHIPPED | OUTPATIENT
Start: 2019-03-21 | End: 2019-03-27 | Stop reason: SDUPTHER

## 2019-03-21 RX ORDER — LOPERAMIDE HYDROCHLORIDE 2 MG/1
2 CAPSULE ORAL
Qty: 20 CAP | Refills: 3 | Status: SHIPPED | OUTPATIENT
Start: 2019-03-21 | End: 2019-09-04 | Stop reason: SDUPTHER

## 2019-03-21 RX ORDER — FLUCONAZOLE 150 MG/1
150 TABLET ORAL DAILY
COMMUNITY
End: 2019-03-22 | Stop reason: ALTCHOICE

## 2019-03-21 RX ORDER — FLUCONAZOLE 150 MG/1
150 TABLET ORAL DAILY
Qty: 1 TAB | Refills: 0 | Status: SHIPPED | OUTPATIENT
Start: 2019-03-21 | End: 2019-03-21 | Stop reason: ALTCHOICE

## 2019-03-21 NOTE — LETTER
NOTIFICATION RETURN TO WORK / SCHOOL 
 
3/21/2019 12:00 PM 
 
Ms. Wilson Morris [address] [place] [address] To Whom It May Concern: 
 
Wilson Morris is currently under the care of [facility]. She will return to work/school on: 3/22/19 If there are questions or concerns please have the patient contact our office. Sincerely, Jacquelynne Riedel, NP

## 2019-03-21 NOTE — PATIENT INSTRUCTIONS

## 2019-03-21 NOTE — PROGRESS NOTES
Subjective:     Chief Complaint   Patient presents with    Leg Pain    Knee Pain       History of Present Illness    Kaiden Haskins is a 34y.o. year old female who is a patient of Dr. Bismark Salmon that presents today with complaints of bilateral knee pain. 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 She was not evaluated after the fall. She is ambulatory. She has not taken anything for her symptoms. NAD. She denies hitting head or LOC. She is also complaining of a rash to her lower and upper back. She states several years ago she was seen by a dermatologist and was told it was fungal and she was given a cream.  She endores having dry skin. No drainage or open areas. No other new complaints at this time. No CP, SOB, GI, or  symptoms. Reviewed medications, recent lab work and imaging with patient. Pt reports compliance with medications. Current Outpatient Medications on File Prior to Visit   Medication Sig Dispense Refill    loperamide (ANTI-DIARRHEAL, LOPERAMIDE,) 2 mg capsule Take 1 Cap by mouth two (2) times daily as needed for Diarrhea. 20 Cap 3    fluconazole (DIFLUCAN) 150 mg tablet Take 150 mg by mouth daily. FDA advises cautious prescribing of oral fluconazole in pregnancy.  amLODIPine (NORVASC) 10 mg tablet Take 1 Tab by mouth daily. 90 Tab 1    hydroCHLOROthiazide (HYDRODIURIL) 25 mg tablet Take 1 Tab by mouth daily. 30 Tab 5    methocarbamol (ROBAXIN) 500 mg tablet Take 1 Tab by mouth three (3) times daily. 15 Tab 0    lidocaine 4 % patch Apply every 12 hours as needed for pain 10 Patch 0    naproxen (NAPROSYN) 500 mg tablet Take 1 Tab by mouth two (2) times daily (with meals). 20 Tab 0    diclofenac EC (VOLTAREN) 75 mg EC tablet Take 1 Tab by mouth two (2) times a day. 30 Tab 0    traMADol (ULTRAM) 50 mg tablet Take 1 Tab by mouth two (2) times daily as needed for Pain.  Max Daily Amount: 100 mg. 20 Tab 0    acetaminophen (TYLENOL) 500 mg tablet Take 2 Tabs by mouth every six (6) hours as needed for Pain. 20 Tab 0    traZODone (DESYREL) 50 mg tablet Take 1 Tab by mouth nightly. 30 Tab 1     No current facility-administered medications on file prior to visit. No Known Allergies   Past Medical History:   Diagnosis Date    Gastritis, Helicobacter pylori     Hypertension     Obesity       Past Surgical History:   Procedure Laterality Date    HX TUBAL LIGATION  2000    LAP,TUBAL CAUTERY  2000      Social History     Tobacco Use    Smoking status: Current Every Day Smoker     Packs/day: 0.25    Smokeless tobacco: Never Used   Substance Use Topics    Alcohol use: Yes     Comment: occ    Drug use: No      Family History   Problem Relation Age of Onset    Hypertension Mother     Hypertension Sister         Objective:     Vitals:    03/21/19 1130   BP: 121/85   Pulse: 73   Resp: 16   Temp: 97.8 °F (36.6 °C)   TempSrc: Oral   SpO2: 97%   Weight: 280 lb (127 kg)   Height: 5' 11\" (1.803 m)       Review of Systems   Constitutional: Negative. HENT: Negative. Eyes: Negative. Respiratory: Negative. Cardiovascular: Negative. Gastrointestinal: Negative. Genitourinary: Negative. Musculoskeletal: Positive for falls and joint pain. Skin: Positive for itching and rash. Neurological: Negative. Psychiatric/Behavioral: Negative. Physical Exam   Constitutional: She is oriented to person, place, and time. She appears well-developed and well-nourished. Pleasant obese AA female  NAD   HENT:   Head: Normocephalic and atraumatic. Eyes: Pupils are equal, round, and reactive to light. Conjunctivae and EOM are normal.   Neck: Normal range of motion. Neck supple. Cardiovascular: Normal rate, regular rhythm and normal heart sounds. Pulmonary/Chest: Effort normal and breath sounds normal. No respiratory distress. She has no wheezes. Abdominal: Soft.  Bowel sounds are normal. She exhibits no distension. There is no tenderness. Musculoskeletal: Normal range of motion. She exhibits no edema, tenderness or deformity. Neurological: She is alert and oriented to person, place, and time. Skin: Skin is warm and dry. Rash noted. Rash is macular. There is erythema. No pallor. Erythematous and pruritic skin lesions to back  +dry fissured skin     Psychiatric: She has a normal mood and affect. Her behavior is normal.   Nursing note and vitals reviewed. Assessment/ Plan:   Diagnoses and all orders for this visit:    1. Acute pain of both knees   Will order  -     REFERRAL TO PHYSICAL THERAPY    2. Skin problem   Will order  -     clotrimazole-betamethasone (LOTRISONE) topical cream; Apply  to affected area two (2) times a day. 3. Obesity (BMI 35.0-39.9 without comorbidity)   Addressed weight, diet and exercise with patient. Decrease carbohydrates (white foods, sweet foods, sweet drinks and alcohol), increase green leafy vegetables and protein (lean meats and beans) with each meal. Avoid fried foods. Eat 3-5 small meals daily. Do not skip meals. Increase water intake. Increase physical activity to 30 minutes daily for health benefit or 60 minutes daily to prevent weight regain, as tolerated. Get 7-8 hours uninterrupted sleep nightly. Patient's plan of care has been reviewed with them. Patient and/or family have verbally conveyed their understanding and agreement of the patient's signs, symptoms, diagnosis, treatment and prognosis and additionally agree to follow up as recommended or return to [facility] should their condition change prior to follow-up. Discharge instructions have also been provided to the patient with some educational information regarding their diagnosis as well a list of reasons why they would want to return to the office prior to their follow-up appointment should their condition change. Follow-up with Dr. Jaret Phelan in 2 months.

## 2019-03-21 NOTE — PROGRESS NOTES
Chief Complaint   Patient presents with    Vaginitis     Pt presents stable for exam; pt reports dark colored urine that started two weeks ago.  Pt reports vaginal odor that started 1-2 weeks ago

## 2019-03-21 NOTE — PROGRESS NOTES
Mabel Ingram is a 34 y.o. female    Chief Complaint   Patient presents with    Leg Pain    Knee Pain     1. Have you been to the ER, urgent care clinic since your last visit? Hospitalized since your last visit? No     2. Have you seen or consulted any other health care providers outside of the [address] [place] [address] since your last visit? Include any pap smears or colon screening.   No     Visit Vitals  /85   Pulse 73   Temp 97.8 °F (36.6 °C) (Oral)   Resp 16   Ht 5' 11\" (1.803 m)   Wt 280 lb (127 kg)   SpO2 97%   BMI 39.05 kg/m²

## 2019-03-21 NOTE — PROGRESS NOTES
Vaginitis evaluation    Chief Complaint   Vaginitis      HPI  34 y.o. female complains of thick whitish vaginal discharge for several days. Patient's last menstrual period was 02/27/2019. Patient took Flagyl for BV, which she thinks she had. Discharge has changed to be similar to a yeast infection. No pain in pelvis. Urine with fishy smell and dark. Has changed her probiotics. Also with loose organish bowels and urine. Stopped taking, and stopped taking them. Bowels have been loose for about 2 weeks (since starting probiotics). Urine smells fishy. Patient has also changed her diet recently. Eating more seafood. She denies additional symptoms at this time. The patient denies aggravating factors. She is not concerned about possible STI exposure at this time. She denies exposure to new chemicals ot hygenic agents  Previous treatment included: none    Past Medical History:   Diagnosis Date    Gastritis, Helicobacter pylori     Hypertension     Obesity      Past Surgical History:   Procedure Laterality Date    HX TUBAL LIGATION  2000    LAP,TUBAL CAUTERY  2000     Social History     Occupational History    Not on file   Tobacco Use    Smoking status: Current Every Day Smoker     Packs/day: 0.25    Smokeless tobacco: Never Used   Substance and Sexual Activity    Alcohol use: Yes     Comment: occ    Drug use: No    Sexual activity: Not Currently     Birth control/protection: Surgical     Family History   Problem Relation Age of Onset    Hypertension Mother     Hypertension Sister         No Known Allergies  Prior to Admission medications    Medication Sig Start Date End Date Taking? Authorizing Provider   amLODIPine (NORVASC) 10 mg tablet Take 1 Tab by mouth daily. 2/11/19  Yes Sal Parikh, DO   hydroCHLOROthiazide (HYDRODIURIL) 25 mg tablet Take 1 Tab by mouth daily. 1/14/19  Yes Sal Parikh, DO   methocarbamol (ROBAXIN) 500 mg tablet Take 1 Tab by mouth three (3) times daily. 10/25/18   BUSHRA Kitchen   lidocaine 4 % patch Apply every 12 hours as needed for pain 10/24/18   BUSHRA Nunes   naproxen (NAPROSYN) 500 mg tablet Take 1 Tab by mouth two (2) times daily (with meals). 10/24/18   BUSHRA Nunes   diclofenac EC (VOLTAREN) 75 mg EC tablet Take 1 Tab by mouth two (2) times a day. 10/1/18   Eugenia Parikh DO   traMADol (ULTRAM) 50 mg tablet Take 1 Tab by mouth two (2) times daily as needed for Pain. Max Daily Amount: 100 mg. 9/24/18   Venkat Lisa DO   acetaminophen (TYLENOL) 500 mg tablet Take 2 Tabs by mouth every six (6) hours as needed for Pain. 7/10/18   Bruce Bumpers, PA-C   traZODone (DESYREL) 50 mg tablet Take 1 Tab by mouth nightly. 6/11/18   Venkat Lisa DO                      Review of Systems - History obtained from the patient  Constitutional: negative for weight loss, fever, night sweats  Breast: negative for breast lumps, nipple discharge, galactorrhea  GI: negative for change in bowel habits, abdominal pain, black or bloody stools  : negative for frequency, dysuria, hematuria  MSK: negative for back pain, joint pain, muscle pain  Skin: negative for itching, rash, hives  Neuro: negative for dizziness, headache, confusion, weakness  Psych: negative for anxiety, depression, change in mood  Heme/lymph: negative for bleeding, bruising, pallor       Objective:    Visit Vitals  /80   Pulse 72   Ht 5' 11\" (1.803 m)   Wt 280 lb (127 kg)   LMP 02/27/2019   BMI 39.05 kg/m²       Physical Exam:   PHYSICAL EXAMINATION    Constitutional  · Appearance: well-nourished, well developed, alert, in no acute distress    HENT  · Head and Face: appears normal    Genitourinary  · External Genitalia: normal appearance for age, no discharge present, no tenderness present, no inflammatory lesions present, no masses present, no atrophy present  · Vagina:   Moderate thick white discharge present, otherwise normal vaginal vault without central or paravaginal defects, no inflammatory lesions present, no masses present  · Bladder: non-tender to palpation  · Urethra: appears normal  · Cervix: normal   · Uterus: normal size, shape and consistency  · Adnexa: no adnexal tenderness present, no adnexal masses present  · Perineum: perineum within normal limits, no evidence of trauma, no rashes or skin lesions present  · Anus: anus within normal limits, no hemorrhoids present  · Inguinal Lymph Nodes: no lymphadenopathy present    Skin  · General Inspection: no rash, no lesions identified    Neurologic/Psychiatric  · Mental Status:  · Orientation: grossly oriented to person, place and time  · Mood and Affect: mood normal, affect appropriate          Results for orders placed or performed in visit on 03/21/19   AMB POC URINALYSIS DIP STICK MANUAL W/O MICRO   Result Value Ref Range    Color (UA POC) Yellow     Clarity (UA POC) Clear     Glucose (UA POC) Negative Negative    Bilirubin (UA POC) 1+ Negative    Ketones (UA POC) Negative Negative    Specific gravity (UA POC) 1.030 1.001 - 1.035    Blood (UA POC) Negative Negative    pH (UA POC) 6.0 4.6 - 8.0    Protein (UA POC) Trace Negative    Urobilinogen (UA POC) 0.2 mg/dL 0.2 - 1    Nitrites (UA POC) Negative Negative    Leukocyte esterase (UA POC) Negative Negative       Assessment:   34 y.o. with vaginal discharge, diarrhea, malodorous urine, and weight gain. Plan:   - nuswab  - will treat empirically for a yeast infection with diflucan  - will try immodium for diarrhea. - will refer to dietician for weight loss strategies. - urine culture    ROV prn if symptoms persist or worsen.

## 2019-03-22 ENCOUNTER — TELEPHONE (OUTPATIENT)
Dept: INTERNAL MEDICINE CLINIC | Age: 39
End: 2019-03-22

## 2019-03-22 NOTE — PATIENT INSTRUCTIONS
How to Get Up Safely After a Fall: Care Instructions  Your Care Instructions    If you have injuries, health problems, or other reasons that may make it easy for you to fall at home, it is a good idea to learn how to get up safely after a fall. Learning how to get up correctly can help you avoid making an injury worse. Also, knowing what to do if you cannot get up can help you stay safe until help arrives. Follow-up care is a key part of your treatment and safety. Be sure to make and go to all appointments, and call your doctor if you are having problems. It's also a good idea to know your test results and keep a list of the medicines you take. How can you care for yourself after a fall? If you think you can get up  First lie still for a few minutes and think about how you feel. If your body feels okay and you think you can get up safely, follow the rest of the steps below:  1. Look for a chair or other piece of furniture that is close to you. 2. Roll onto your side and rest. Roll by turning your head in the direction you want to roll, move your shoulder and arm, then hip and leg in the same direction. 3. Lie still for a moment to let your blood pressure adjust.  4. Slowly push your upper body up, lift your head, and take a moment to rest.  5. Slowly get up on your hands and knees, and crawl to the chair or other stable piece of furniture. 6. Put your hands on the chair. 7. Move one foot forward, and place it flat on the floor. Your other leg should be bent with the knee on the floor. 8. Rise slowly, turn your body, and sit in the chair. Stay seated for a bit and think about how you feel. Call for help. Even if you feel okay, let someone know what happened to you. You might not know that you have a serious injury. If you cannot get up  1. If you think you are injured after a fall or you cannot get up, try not to panic. 2. Call out for help.   3. If you have a phone within reach or you have an emergency call device, use it to call for help. 4. If you do not have a phone within reach, try to slide yourself toward it. If you cannot get to the phone, try to slide toward a door or window or a place where you think you can be heard. 5. Branch or use an object to make noise so someone might hear you. 6. If you can reach something that you can use for a pillow, place it under your head. Try to stay warm by covering yourself with a blanket or clothing while you wait for help. When should you call for help? Call 911 anytime you think you may need emergency care. For example, call if:    · You passed out (lost consciousness).     · You cannot get up after a fall.     · You have severe pain.    Call your doctor now or seek immediate medical care if:    · You have new or worse pain.     · You are dizzy or lightheaded.     · You hit your head.    Watch closely for changes in your health, and be sure to contact your doctor if:    · You do not get better as expected. Where can you learn more? Go to http://tanisha-verito.info/. Enter H044 in the search box to learn more about \"How to Get Up Safely After a Fall: Care Instructions. \"  Current as of: March 15, 2018  Content Version: 11.9  © 1885-3332 [employer]. Care instructions adapted under license by [employer] (which disclaims liability or warranty for this information). If you have questions about a medical condition or this instruction, always ask your healthcare professional. [address] any warranty or liability for your use of this information.

## 2019-03-22 NOTE — PROGRESS NOTES
Spoke with the patient regarding her x-ray results for her kneed. Patient voiced her understanding and stated that she was in excruciating pain at this time. Patient was given a number for an orthopedic doctor at this time for further evaluation. Patient stated that she was going to call and schedule an appointment to be seen.

## 2019-03-22 NOTE — TELEPHONE ENCOUNTER
Patient wants to discuss x- ray results.  She state her leg is still swollen and it hurts to stand on it  Please contact patient at [phone]  Patient would prefer a call back today

## 2019-03-22 NOTE — TELEPHONE ENCOUNTER
Patient states she scheduled an ortho appointment on 3/28/19- she needs a note to return back to work after her appointment on 3/28/19

## 2019-03-23 LAB — BACTERIA UR CULT: NORMAL

## 2019-03-25 NOTE — TELEPHONE ENCOUNTER
Call placed to pt and she stated that she went to [facility] per her employers request and they have released her, no further need from this office

## 2019-03-26 LAB
A VAGINAE DNA VAG QL NAA+PROBE: ABNORMAL SCORE
BVAB2 DNA VAG QL NAA+PROBE: ABNORMAL SCORE
C ALBICANS DNA VAG QL NAA+PROBE: POSITIVE
C GLABRATA DNA VAG QL NAA+PROBE: NEGATIVE
MEGA1 DNA VAG QL NAA+PROBE: ABNORMAL SCORE
T VAGINALIS RRNA SPEC QL NAA+PROBE: NEGATIVE

## 2019-03-27 DIAGNOSIS — L98.9 SKIN PROBLEM: ICD-10-CM

## 2019-03-27 RX ORDER — CLOTRIMAZOLE AND BETAMETHASONE DIPROPIONATE 10; .64 MG/G; MG/G
CREAM TOPICAL
Qty: 15 G | Refills: 0 | Status: SHIPPED | OUTPATIENT
Start: 2019-03-27 | End: 2020-01-14 | Stop reason: ALTCHOICE

## 2019-04-09 ENCOUNTER — HOSPITAL ENCOUNTER (OUTPATIENT)
Dept: DIABETES SERVICES | Age: 39
Discharge: HOME | End: 2019-04-09
Payer: COMMERCIAL

## 2019-04-09 PROCEDURE — 97802 MEDICAL NUTRITION INDIV IN: CPT | Performed by: DIETITIAN, REGISTERED

## 2019-04-09 NOTE — DIABETES MGMT
Diabetes Treatment  Center - Nutrition Evaluation       DATE: 2019      REFERRING PHYSICIAN: Del Machado  NAME: Stephenie Birmingham : 1980 AGE: 34 y.o. GENDER: female  REASON FOR VISIT: weight loss    ASSESSMENT:  Past Medical Hx: HTN     Pt comes to visit today looking for help with weight loss. Pt shared that she is at her heaviest weight of ~ 280 lbs. Pt reports weighing 218 lbs at approx 18 years at age, 44 - 240 lbs at approx 20years of age after having 2 children. Pt reports UBW of 267 lbs since  with \"yo-yo\" losses and gains. Pt shared that she has always been very active often having 2 jobs, \"one of which I may sit but the other one that I am walking around, moving things, etc.\". Pt shared that she currently works as behavior tech at [employer] and also works at another facility that doesn't allow her to be as active. Pt shared that she often skips meals due to business and being short-staffed at work. Pt is not able to bring lunch to work as no outside food is allowed. Pt shared that \" I have never really counted calories or anything because I am just too busy and I don't have time\". \" I just need someone to tell me exactly what to eat and I can do that\". Pt shared that she wants to lose the weight fast, thinking about doing liposuction \"if this doesn't work\". LABS:   No results found for: HBA1C, HGBE8, THD0HQDN  Lab Results   Component Value Date/Time    Creatinine 0.82 05/15/2018 09:32 AM     CrCl cannot be calculated (Patient's most recent lab result is older than the maximum 180 days allowed. ). Lab Results   Component Value Date/Time    Cholesterol, total 186 05/15/2018 09:32 AM    HDL Cholesterol 56 05/15/2018 09:32 AM    LDL, calculated 114 (H) 05/15/2018 09:32 AM    Triglyceride 80 05/15/2018 09:32 AM       MEDICATIONS/SUPPLEMENTS:   [unfilled]  Prior to Admission medications    Medication Sig Start Date End Date Taking?  Authorizing Provider   clotrimazole-betamethasone (LOTRISONE) topical cream APPLY  CREAM TOPICALLY TO AFFECTED AREA TWICE DAILY 3/27/19   Lolita Diaz NP   loperamide (ANTI-DIARRHEAL, LOPERAMIDE,) 2 mg capsule Take 1 Cap by mouth two (2) times daily as needed for Diarrhea. 3/21/19   Kerline Sexton MD   amLODIPine (NORVASC) 10 mg tablet Take 1 Tab by mouth daily. 2/11/19   Jyothi Campuzano DO   hydroCHLOROthiazide (HYDRODIURIL) 25 mg tablet Take 1 Tab by mouth daily. 1/14/19   Ulices Parikh,    naproxen (NAPROSYN) 500 mg tablet Take 1 Tab by mouth two (2) times daily (with meals). 10/24/18   BUSHRA Zaman   diclofenac EC (VOLTAREN) 75 mg EC tablet Take 1 Tab by mouth two (2) times a day. 10/1/18   Jyothi Campuzano DO   acetaminophen (TYLENOL) 500 mg tablet Take 2 Tabs by mouth every six (6) hours as needed for Pain. 7/10/18   Ashia Lazo PA-C   traZODone (DESYREL) 50 mg tablet Take 1 Tab by mouth nightly. 6/11/18   Jyothi Campuzano DO       FOOD ALLERGIES/INTOLERANCES: milk, yogurt - GI upset    ANTHROPOMETRICS:    Ht Readings from Last 1 Encounters:   03/21/19 5' 11\" (1.803 m)      Wt Readings from Last 1 Encounters:   03/21/19 127 kg (280 lb)      IBW:155 # +/- 10%    BMI: 38.81 kg/m2  Wt:  4/9/19 278.3 lbs    Reported Wt Hx: Pt shared that she is at her heaviest weight of ~ 280 lbs. Pt reports weighing 218 lbs at approx 18 years at age, 44 - 240 lbs at approx 20years of age after having 2 children. Pt reports UBW of 267 lbs since 2010 with \"yo-yo\" losses and gains. Estimated Nutritional Needs: 2533 kcal/day for weight maintenance (using adj BW x 30kcal/kg) ; 2093 kcal/day for 1 lb weight loss weekly; ~ 84 g protein daily (using adjust BW x 1g/kg )    Reported Diet Hx: often skips meals, has worries that certain foods will cause her to gain weight such as carbohydrates.      24 Hour Diet Recall  Breakfast   often skips   Lunch 1 am  1 cup lasagna , water   Snack  3:30 pm 1/2 lb spiced shrimp, coors light   Dinner 9:30 pm 1 cup turkey lasagna, water   Snack  1/2 cup peanuts   Beverages  Mostly drinks water, may have 5 alcoholic drinks on the weekend   Pt may have cravings around menstrual cycle such as peanut butter and jelly (\"I never eat more than 2 at a time\"). Pt will also eat some chocolate by taking small pieces of a chocolate bar and eating them, wrapping it up and opening later. Exercise/Physical Actvity: Pt doing little activity currently other than work related activity. Pt with left knee sprain and doing PT. Pt enjoys walking and physical activity in general.    Environmental/Social: Works 2 jobs, often 12-16 hour days/shifts, has 2 children 19+      NUTRITION DIAGNOSIS: 4/9/19 Obese consistent with lack of nutrition knowledge to support weight loss needs supported by BMI of 35.81 m/k2 and diet recall. NUTRITION INTERVENTION:  Educator discussed factors impacting weight loss, focused on 3 pillars to help maintain success with weight loss long term (discussed calorie counting, accountability, and behavior change)  - discussed how these can be used/are used to help with weight loss. Stressed how behavior change is crucial for long-term success and how \"just telling someone what to eat\" does not provide tools to keep weight off or prevent weight gain in future. Discussed some behaviors and barriers that contribute to weight difficulties. Educator and pt completed behavior and barrier checklist together. Pt identified 2 behaviors that she was willing to start. Pt agreed to schedule for weight class on 4/17/19. Discussed how inadequate intake can negatively impact weight loss and make it difficult to lose weight. Encouraged pt to take easy-to -grab foods with when busy to help avoid skipped meals. PATIENT GOALS:  1. Begin consuming 3 meals daily (even if something small)  2.  Make exercise a priority by doing exercises for 10 minutes daily (exercises that do not require movement of legs given knee sprain)          Specific tips and techniques to facilitate compliance with above recommendations were provided and discussed. Pt was strongly encourage to begin making necessary changes now and follow as scheduled   . If you have any questions please feel free to contact me at [phone].        Vicky Mckinnon RD

## 2019-04-16 ENCOUNTER — HOSPITAL ENCOUNTER (EMERGENCY)
Age: 39
Discharge: HOME | End: 2019-04-16
Attending: EMERGENCY MEDICINE
Payer: COMMERCIAL

## 2019-04-16 VITALS
TEMPERATURE: 97.8 F | DIASTOLIC BLOOD PRESSURE: 77 MMHG | HEIGHT: 71 IN | BODY MASS INDEX: 39.41 KG/M2 | HEART RATE: 72 BPM | WEIGHT: 281.5 LBS | SYSTOLIC BLOOD PRESSURE: 122 MMHG | RESPIRATION RATE: 17 BRPM | OXYGEN SATURATION: 100 %

## 2019-04-16 DIAGNOSIS — M76.891 TENDONITIS OF KNEE, RIGHT: Primary | ICD-10-CM

## 2019-04-16 PROCEDURE — 74011250636 HC RX REV CODE- 250/636: Performed by: PHYSICIAN ASSISTANT

## 2019-04-16 PROCEDURE — 99283 EMERGENCY DEPT VISIT LOW MDM: CPT

## 2019-04-16 PROCEDURE — 96372 THER/PROPH/DIAG INJ SC/IM: CPT

## 2019-04-16 RX ORDER — KETOROLAC TROMETHAMINE 30 MG/ML
30 INJECTION, SOLUTION INTRAMUSCULAR; INTRAVENOUS
Status: COMPLETED | OUTPATIENT
Start: 2019-04-16 | End: 2019-04-16

## 2019-04-16 RX ORDER — IBUPROFEN 800 MG/1
800 TABLET ORAL
Qty: 20 TAB | Refills: 0 | Status: SHIPPED | OUTPATIENT
Start: 2019-04-16 | End: 2019-04-23

## 2019-04-16 RX ORDER — NAPROXEN 500 MG/1
500 TABLET ORAL 2 TIMES DAILY WITH MEALS
Qty: 20 TAB | Refills: 0 | Status: SHIPPED | OUTPATIENT
Start: 2019-04-16 | End: 2019-04-16

## 2019-04-16 RX ORDER — ACETAMINOPHEN 500 MG
1000 TABLET ORAL
Qty: 20 TAB | Refills: 0 | Status: SHIPPED | OUTPATIENT
Start: 2019-04-16 | End: 2019-04-16

## 2019-04-16 RX ADMIN — KETOROLAC TROMETHAMINE 30 MG: 30 INJECTION, SOLUTION INTRAMUSCULAR at 09:30

## 2019-04-16 NOTE — ED NOTES
Pt given printed discharge instructions and 1 script(s). Pt verbalized understanding of instructions and script(s). Pt verbalized importance of following up with ortho. Pt alert and oriented, in no acute distress, ambulatory with self. Emergency Department Nursing Plan of Care       The Nursing Plan of Care is developed from the Nursing assessment and Emergency Department Attending provider initial evaluation. The plan of care may be reviewed in the ED Provider note.     The Plan of Care was developed with the following considerations:   Patient / Family readiness to learn indicated by:verbalized understanding  Persons(s) to be included in education: patient  Barriers to Learning/Limitations: no    Signed     Patricio Dodson RN    4/16/2019   9:45 AM

## 2019-04-16 NOTE — ED PROVIDER NOTES
EMERGENCY DEPARTMENT HISTORY AND PHYSICAL EXAM      Date: 4/16/2019  Patient Name: Warden Thornton    History of Presenting Illness     Chief Complaint   Patient presents with    Knee Pain     pt c/o rt knee pain x 3 days without known injury/fall. History Provided By: Patient    HPI: Warden Thornton, 34 y.o. female with PMHx significant for hypertension, obesity, gastritis, tubal ligation, tobacco abuse, presents ambulatory to the ED with cc of acute aching moderate right knee pain X 3 days. No known injuries or falls. Patient endorses she had a left knee injury at work 1 month ago. Normal x-rays in March 2019 of bilateral knees. States she was given a brace for her left knee and tended to lean to the right to compensate for pain. Topical icy hot without relief. No other medications. Denies fever, chills, nausea, vomiting, redness, swelling, warmth, rash. There are no other complaints, changes, or physical findings at this time. PCP: Joe Day, DO    No current facility-administered medications on file prior to encounter. Current Outpatient Medications on File Prior to Encounter   Medication Sig Dispense Refill    clotrimazole-betamethasone (LOTRISONE) topical cream APPLY  CREAM TOPICALLY TO AFFECTED AREA TWICE DAILY 15 g 0    loperamide (ANTI-DIARRHEAL, LOPERAMIDE,) 2 mg capsule Take 1 Cap by mouth two (2) times daily as needed for Diarrhea. 20 Cap 3    amLODIPine (NORVASC) 10 mg tablet Take 1 Tab by mouth daily. 90 Tab 1    hydroCHLOROthiazide (HYDRODIURIL) 25 mg tablet Take 1 Tab by mouth daily. 30 Tab 5    diclofenac EC (VOLTAREN) 75 mg EC tablet Take 1 Tab by mouth two (2) times a day. 30 Tab 0    traZODone (DESYREL) 50 mg tablet Take 1 Tab by mouth nightly.  30 Tab 1       Past History     Past Medical History:  Past Medical History:   Diagnosis Date    Gastritis, Helicobacter pylori     Hypertension     Obesity        Past Surgical History:  Past Surgical History:   Procedure Laterality Date    HX TUBAL LIGATION  2000    LAP,TUBAL CAUTERY  2000       Family History:  Family History   Problem Relation Age of Onset   Omkar Ganjani Hypertension Mother     Hypertension Sister        Social History:  Social History     Tobacco Use    Smoking status: Current Every Day Smoker     Packs/day: 0.25    Smokeless tobacco: Never Used   Substance Use Topics    Alcohol use: Yes     Comment: occ    Drug use: No       Allergies:  No Known Allergies      Review of Systems   Review of Systems   Constitutional: Negative for activity change, appetite change, chills, diaphoresis, fatigue and fever. HENT: Negative. Eyes: Negative. Respiratory: Negative. Negative for cough and shortness of breath. Cardiovascular: Negative. Negative for chest pain and leg swelling. Gastrointestinal: Negative. Negative for abdominal pain, diarrhea, nausea and vomiting. Genitourinary: Negative. Musculoskeletal: Positive for arthralgias. Negative for back pain, joint swelling and neck pain. Skin: Negative. Negative for color change, pallor and wound. Neurological: Negative. Negative for numbness. Psychiatric/Behavioral: Negative. Physical Exam   Physical Exam   Constitutional: She is oriented to person, place, and time. She appears well-developed and well-nourished. No distress. HENT:   Head: Normocephalic and atraumatic. Right Ear: Hearing and external ear normal.   Left Ear: Hearing and external ear normal.   Nose: Nose normal.   Eyes: Pupils are equal, round, and reactive to light. Conjunctivae and EOM are normal.   Neck: Normal range of motion. Cardiovascular: Intact distal pulses. Pulses:       Posterior tibial pulses are 2+ on the right side, and 2+ on the left side. Pulmonary/Chest: Effort normal. No respiratory distress. Musculoskeletal: Normal range of motion.         Right hip: Normal.        Right knee: She exhibits normal range of motion, no swelling, no effusion, no ecchymosis, no deformity, no laceration, no erythema, normal alignment, no LCL laxity, no bony tenderness, normal meniscus and no MCL laxity. Tenderness found. Patellar tendon tenderness noted. Left knee: Normal.        Right ankle: Normal.        Right lower leg: She exhibits no tenderness, no bony tenderness, no swelling, no edema, no deformity and no laceration. No warmth, swelling, pitting edema, limited range of motion, crepitus, deformity, erythema. Neurovascular intact. Neurological: She is alert and oriented to person, place, and time. Skin: Skin is warm and dry. She is not diaphoretic. Psychiatric: She has a normal mood and affect. Her behavior is normal. Judgment and thought content normal.   Nursing note and vitals reviewed. Diagnostic Study Results     Labs -   No results found for this or any previous visit (from the past 12 hour(s)). Radiologic Studies -   No orders to display     CT Results  (Last 48 hours)    None        CXR Results  (Last 48 hours)    None            Medical Decision Making   I am the first provider for this patient. I reviewed the vital signs, available nursing notes, past medical history, past surgical history, family history and social history. Vital Signs-Reviewed the patient's vital signs. Patient Vitals for the past 12 hrs:   Temp Pulse Resp BP SpO2   04/16/19 0857 97.8 °F (36.6 °C) 72 17 122/77 100 %       Pulse Oximetry Analysis - 100% on RA    Records Reviewed: Nursing Notes, Old Medical Records, Previous Radiology Studies and Previous Laboratory Studies    Provider Notes (Medical Decision Making):   43-year-old female presents with acute right knee pain. No known injuries or falls. Benign exam.  Patient endorses she injured her left knee a month ago had x-rays of bilateral knees which were benign in March 2019. Patient endorses she had a brace to her left knee and was compensating for left knee pain by leaning to the right side.   Suspect right knee tendinitis. Differential includes bursitis, sprain, strain, arthritis. No new imaging indicated at this time. Plan to treat with RICE and NSAIDs. ED Course:   Initial assessment performed. The patients presenting problems have been discussed, and they are in agreement with the care plan formulated and outlined with them. I have encouraged them to ask questions as they arise throughout their visit. Critical Care Time:   0    Disposition:  9:19 AM  I have discussed with patient their diagnosis, treatment, and follow up plan. The patient agrees to follow up as outlined in discharge paperwork and also to return to the ED with any worsening. Jamia Grant PA-C    PLAN:  1. Current Discharge Medication List      CONTINUE these medications which have CHANGED    Details   naproxen (NAPROSYN) 500 mg tablet Take 1 Tab by mouth two (2) times daily (with meals). Qty: 20 Tab, Refills: 0      acetaminophen (TYLENOL) 500 mg tablet Take 2 Tabs by mouth every six (6) hours as needed for Pain. Qty: 20 Tab, Refills: 0         CONTINUE these medications which have NOT CHANGED    Details   clotrimazole-betamethasone (LOTRISONE) topical cream APPLY  CREAM TOPICALLY TO AFFECTED AREA TWICE DAILY  Qty: 15 g, Refills: 0    Comments: Please consider 90 day supplies to promote better adherence  Associated Diagnoses: Skin problem      loperamide (ANTI-DIARRHEAL, LOPERAMIDE,) 2 mg capsule Take 1 Cap by mouth two (2) times daily as needed for Diarrhea. Qty: 20 Cap, Refills: 3      amLODIPine (NORVASC) 10 mg tablet Take 1 Tab by mouth daily. Qty: 90 Tab, Refills: 1      hydroCHLOROthiazide (HYDRODIURIL) 25 mg tablet Take 1 Tab by mouth daily. Qty: 30 Tab, Refills: 5      diclofenac EC (VOLTAREN) 75 mg EC tablet Take 1 Tab by mouth two (2) times a day. Qty: 30 Tab, Refills: 0      traZODone (DESYREL) 50 mg tablet Take 1 Tab by mouth nightly. Qty: 30 Tab, Refills: 1           2.    Follow-up Information     Follow up With Specialties Details Why Contact Info    [facility]  Schedule an appointment as soon as possible for a visit in 2 days As needed [address]  [place] [address] [address]  Schedule an appointment as soon as possible for a visit in 2 days As needed [address] [place] [id number] [facility]  Schedule an appointment as soon as possible for a visit in 2 days As needed [phone]        Return to ED if worse     Diagnosis     Clinical Impression:   1.  Tendonitis of knee, right        Attestations:

## 2019-04-16 NOTE — LETTER
[facility] EMERGENCY DEPT 
[address] [facility] [address] [id number] 
[phone] Work/School Note Date: 4/16/2019 To Whom It May concern: 
 
Bharti Dodd was seen and treated today in the emergency room by the following provider(s): 
Attending Provider: Job Menezes MD 
Physician Assistant: Clint Espinoza PA-C. Bharti Allegra may return to work on 4/18/19. Sincerely, Tawny Gillespie

## 2019-04-16 NOTE — DISCHARGE INSTRUCTIONS
Patient Education        Learning About RICE (Rest, Ice, Compression, and Elevation)  What is RICE? RICE is a way to care for an injury. RICE helps relieve pain and swelling. It may also help with healing and flexibility. RICE stands for:  · Rest and protect the injured or sore area. · Ice or a cold pack used as soon as possible. · Compression, or wrapping the injured or sore area with an elastic bandage. · Elevation (propping up) the injured or sore area. How do you do RICE? You can use RICE for home treatment when you have general aches and pains or after an injury or surgery. Rest  · Do not put weight on the injury for at least 24 to 48 hours. · Use crutches for a badly sprained knee or ankle. · Support a sprained wrist, elbow, or shoulder with a sling. Ice  · Put ice or a cold pack on the injury right away to reduce pain and swelling. Frozen vegetables will also work as an ice pack. Put a thin cloth between the ice or cold pack and your skin. The cloth protects the injured area from getting too cold. · Use ice for 10 to 15 minutes at a time for the first 48 to 72 hours. Compression  · Use compression for sprains, strains, and surgeries of the arms and legs. · Wrap the injured area with an elastic bandage or compression sleeve to reduce swelling. · Don't wrap it too tightly. If the area below it feels numb, tingles, or feels cool, loosen the wrap. Elevation  · Use elevation for areas of the body that can be propped up, such as arms and legs. · Prop up the injured area on pillows whenever you use ice. Keep it propped up anytime you sit or lie down. · Try to keep the injured area at or above the level of your heart. This will help reduce swelling and bruising. Where can you learn more? Go to http://tanisha-verito.info/. Enter Q146 in the search box to learn more about \"Learning About RICE (Rest, Ice, Compression, and Elevation). \"  Current as of: September 20, 2018  Content Version: 11.9  © 4691-6694 [employer]. Care instructions adapted under license by [employer] (which disclaims liability or warranty for this information). If you have questions about a medical condition or this instruction, always ask your healthcare professional. [address] any warranty or liability for your use of this information.

## 2019-04-17 ENCOUNTER — TELEPHONE (OUTPATIENT)
Dept: INTERNAL MEDICINE CLINIC | Age: 39
End: 2019-04-17

## 2019-04-17 DIAGNOSIS — M76.891 TENDINITIS OF RIGHT KNEE: Primary | ICD-10-CM

## 2019-04-17 NOTE — TELEPHONE ENCOUNTER
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 ER on 4/16/19 and was  Diagnose with Tendinitis on R Knee. Er recommended Physical therapy. Pt would like to know if she would be able to use the referral she received back on March 21 for her R knee?  Pt would like a call back about referral request. Callback: [phone]

## 2019-04-17 NOTE — TELEPHONE ENCOUNTER
Returned the pt's call and after verifying HIPAA, advised her that a new referral will be sent to her for PT. The pt requested that the order be sent to [address] [place] [address]. Order faxed to [phone]. A copy of the order was also mailed to the pt.

## 2019-04-24 ENCOUNTER — HOSPITAL ENCOUNTER (EMERGENCY)
Age: 39
Discharge: HOME | End: 2019-04-24
Attending: EMERGENCY MEDICINE
Payer: COMMERCIAL

## 2019-04-24 VITALS
HEART RATE: 72 BPM | WEIGHT: 279.54 LBS | HEIGHT: 71 IN | SYSTOLIC BLOOD PRESSURE: 110 MMHG | DIASTOLIC BLOOD PRESSURE: 67 MMHG | OXYGEN SATURATION: 100 % | RESPIRATION RATE: 14 BRPM | TEMPERATURE: 98.4 F | BODY MASS INDEX: 39.14 KG/M2

## 2019-04-24 DIAGNOSIS — K21.9 GASTROESOPHAGEAL REFLUX DISEASE, ESOPHAGITIS PRESENCE NOT SPECIFIED: Primary | ICD-10-CM

## 2019-04-24 LAB
ALBUMIN SERPL-MCNC: 3.6 G/DL (ref 3.5–5)
ALBUMIN/GLOB SERPL: 0.9 {RATIO} (ref 1.1–2.2)
ALP SERPL-CCNC: 68 U/L (ref 45–117)
ALT SERPL-CCNC: 22 U/L (ref 12–78)
ANION GAP SERPL CALC-SCNC: 9 MMOL/L (ref 5–15)
AST SERPL-CCNC: 15 U/L (ref 15–37)
BASOPHILS # BLD: 0.1 K/UL (ref 0–0.1)
BASOPHILS NFR BLD: 1 % (ref 0–1)
BILIRUB SERPL-MCNC: 0.4 MG/DL (ref 0.2–1)
BUN SERPL-MCNC: 13 MG/DL (ref 6–20)
BUN/CREAT SERPL: 13 (ref 12–20)
CALCIUM SERPL-MCNC: 8.3 MG/DL (ref 8.5–10.1)
CHLORIDE SERPL-SCNC: 101 MMOL/L (ref 97–108)
CO2 SERPL-SCNC: 28 MMOL/L (ref 21–32)
COMMENT, HOLDF: NORMAL
CREAT SERPL-MCNC: 1.02 MG/DL (ref 0.55–1.02)
DIFFERENTIAL METHOD BLD: NORMAL
EOSINOPHIL # BLD: 0.2 K/UL (ref 0–0.4)
EOSINOPHIL NFR BLD: 3 % (ref 0–7)
ERYTHROCYTE [DISTWIDTH] IN BLOOD BY AUTOMATED COUNT: 13 % (ref 11.5–14.5)
GLOBULIN SER CALC-MCNC: 3.8 G/DL (ref 2–4)
GLUCOSE SERPL-MCNC: 91 MG/DL (ref 65–100)
HCT VFR BLD AUTO: 37.7 % (ref 35–47)
HGB BLD-MCNC: 12.2 G/DL (ref 11.5–16)
IMM GRANULOCYTES # BLD AUTO: 0 K/UL (ref 0–0.04)
IMM GRANULOCYTES NFR BLD AUTO: 0 % (ref 0–0.5)
LIPASE SERPL-CCNC: 149 U/L (ref 73–393)
LYMPHOCYTES # BLD: 2.1 K/UL (ref 0.8–3.5)
LYMPHOCYTES NFR BLD: 37 % (ref 12–49)
MCH RBC QN AUTO: 26.1 PG (ref 26–34)
MCHC RBC AUTO-ENTMCNC: 32.4 G/DL (ref 30–36.5)
MCV RBC AUTO: 80.6 FL (ref 80–99)
MONOCYTES # BLD: 0.7 K/UL (ref 0–1)
MONOCYTES NFR BLD: 11 % (ref 5–13)
NEUTS SEG # BLD: 2.7 K/UL (ref 1.8–8)
NEUTS SEG NFR BLD: 48 % (ref 32–75)
NRBC # BLD: 0 K/UL (ref 0–0.01)
NRBC BLD-RTO: 0 PER 100 WBC
PLATELET # BLD AUTO: 380 K/UL (ref 150–400)
PMV BLD AUTO: 9.9 FL (ref 8.9–12.9)
POTASSIUM SERPL-SCNC: 3.6 MMOL/L (ref 3.5–5.1)
PROT SERPL-MCNC: 7.4 G/DL (ref 6.4–8.2)
RBC # BLD AUTO: 4.68 M/UL (ref 3.8–5.2)
SAMPLES BEING HELD,HOLD: NORMAL
SODIUM SERPL-SCNC: 138 MMOL/L (ref 136–145)
TROPONIN I SERPL-MCNC: <0.05 NG/ML
WBC # BLD AUTO: 5.7 K/UL (ref 3.6–11)

## 2019-04-24 PROCEDURE — 85025 COMPLETE CBC W/AUTO DIFF WBC: CPT

## 2019-04-24 PROCEDURE — 74011250637 HC RX REV CODE- 250/637: Performed by: EMERGENCY MEDICINE

## 2019-04-24 PROCEDURE — 96374 THER/PROPH/DIAG INJ IV PUSH: CPT

## 2019-04-24 PROCEDURE — 83690 ASSAY OF LIPASE: CPT

## 2019-04-24 PROCEDURE — 36415 COLL VENOUS BLD VENIPUNCTURE: CPT

## 2019-04-24 PROCEDURE — 80053 COMPREHEN METABOLIC PANEL: CPT

## 2019-04-24 PROCEDURE — 74011000250 HC RX REV CODE- 250: Performed by: EMERGENCY MEDICINE

## 2019-04-24 PROCEDURE — 74011250636 HC RX REV CODE- 250/636: Performed by: EMERGENCY MEDICINE

## 2019-04-24 PROCEDURE — 84484 ASSAY OF TROPONIN QUANT: CPT

## 2019-04-24 PROCEDURE — 99284 EMERGENCY DEPT VISIT MOD MDM: CPT

## 2019-04-24 PROCEDURE — C9113 INJ PANTOPRAZOLE SODIUM, VIA: HCPCS | Performed by: EMERGENCY MEDICINE

## 2019-04-24 PROCEDURE — 93005 ELECTROCARDIOGRAM TRACING: CPT

## 2019-04-24 RX ORDER — PANTOPRAZOLE SODIUM 40 MG/10ML
40 INJECTION, POWDER, LYOPHILIZED, FOR SOLUTION INTRAVENOUS
Status: COMPLETED | OUTPATIENT
Start: 2019-04-24 | End: 2019-04-24

## 2019-04-24 RX ORDER — NAPROXEN 250 MG/1
250 TABLET ORAL 2 TIMES DAILY WITH MEALS
COMMUNITY
End: 2020-01-14 | Stop reason: DRUGHIGH

## 2019-04-24 RX ADMIN — LIDOCAINE HYDROCHLORIDE 40 ML: 20 SOLUTION ORAL; TOPICAL at 16:23

## 2019-04-24 RX ADMIN — PANTOPRAZOLE SODIUM 40 MG: 40 INJECTION, POWDER, FOR SOLUTION INTRAVENOUS at 16:23

## 2019-04-24 NOTE — DISCHARGE INSTRUCTIONS

## 2019-04-24 NOTE — ED PROVIDER NOTES
'I tyhink my h pylori is back/ I was reading on the internet and I got scared / I called my PCP/ they told me to come here';     pt denies HA, vison changes, diff swallowing, SOB,  F/Ch, N/V, D/Cons or other current systemic complaints    The history is provided by the patient. Epigastric Pain    This is a new problem. The current episode started more than 1 week ago. The problem has not changed since onset. The pain is associated with an unknown factor. The pain is located in the epigastric region. The quality of the pain is dull, aching and cramping. Associated symptoms include anorexia and nausea. Pertinent negatives include no fever, no belching, no diarrhea, no flatus, no hematochezia, no melena, no vomiting, no constipation, no dysuria, no frequency, no hematuria, no headaches, no arthralgias, no myalgias, no trauma, no chest pain and no back pain. The pain is worsened by palpation. The pain is relieved by nothing. Her past medical history is significant for GERD. Past medical history comments: 'had h pylori'. The patient's surgical history non-contributory.        Past Medical History:   Diagnosis Date    Gastritis, Helicobacter pylori     Hypertension     Obesity        Past Surgical History:   Procedure Laterality Date    HX TUBAL LIGATION  2000    LAP,TUBAL CAUTERY  2000         Family History:   Problem Relation Age of Onset    Hypertension Mother     Hypertension Sister        Social History     Socioeconomic History    Marital status: SINGLE     Spouse name: Not on file    Number of children: Not on file    Years of education: Not on file    Highest education level: Not on file   Occupational History    Not on file   Social Needs    Financial resource strain: Not on file    Food insecurity:     Worry: Not on file     Inability: Not on file    Transportation needs:     Medical: Not on file     Non-medical: Not on file   Tobacco Use    Smoking status: Former Smoker     Packs/day: 0.25 Last attempt to quit: 3/16/2018     Years since quittin.1    Smokeless tobacco: Never Used   Substance and Sexual Activity    Alcohol use: Yes     Comment: occ    Drug use: No    Sexual activity: Not Currently     Birth control/protection: Surgical   Lifestyle    Physical activity:     Days per week: Not on file     Minutes per session: Not on file    Stress: Not on file   Relationships    Social connections:     Talks on phone: Not on file     Gets together: Not on file     Attends Nondenominational service: Not on file     Active member of club or organization: Not on file     Attends meetings of clubs or organizations: Not on file     Relationship status: Not on file    Intimate partner violence:     Fear of current or ex partner: Not on file     Emotionally abused: Not on file     Physically abused: Not on file     Forced sexual activity: Not on file   Other Topics Concern    Not on file   Social History Narrative    Not on file         ALLERGIES: Patient has no known allergies. Review of Systems   Constitutional: Negative for fever. Cardiovascular: Negative for chest pain. Gastrointestinal: Positive for anorexia and nausea. Negative for constipation, diarrhea, flatus, hematochezia, melena and vomiting. Genitourinary: Negative for dysuria, frequency and hematuria. Musculoskeletal: Negative for arthralgias, back pain and myalgias. Neurological: Negative for headaches. All other systems reviewed and are negative. There were no vitals filed for this visit. Physical Exam   Constitutional: She is oriented to person, place, and time. She appears well-developed and well-nourished.   m obese, NAD, AxOx4, speaking in complete sentences     HENT:   Head: Normocephalic and atraumatic. Nose: Nose normal.   Mouth/Throat: Oropharynx is clear and moist. No oropharyngeal exudate. Eyes: Pupils are equal, round, and reactive to light.  Conjunctivae and EOM are normal. Right eye exhibits no discharge. Left eye exhibits no discharge. No scleral icterus. Neck: Normal range of motion. Neck supple. No JVD present. No tracheal deviation present. Cardiovascular: Normal rate, regular rhythm and normal heart sounds. Exam reveals no gallop and no friction rub. No murmur heard. Pulmonary/Chest: Effort normal and breath sounds normal. No stridor. No respiratory distress. She has no wheezes. She has no rales. She exhibits no tenderness. Abdominal: Soft. Bowel sounds are normal. She exhibits no distension and no mass. There is no tenderness. There is no rebound and no guarding. No hernia. Genitourinary: No vaginal discharge found. Genitourinary Comments: Pt denies urinary/ vaginal complaints   Musculoskeletal: Normal range of motion. She exhibits no edema or tenderness. Neurological: She is alert and oriented to person, place, and time. She displays normal reflexes. No cranial nerve deficit or sensory deficit. She exhibits normal muscle tone. Coordination normal.   pt has motor/ CV/ Sensation grossly intact to all extremities, R = L in strength;   Skin: Skin is warm and dry. Capillary refill takes less than 2 seconds. No rash noted. No erythema. No pallor. Psychiatric: She has a normal mood and affect. Her behavior is normal. Thought content normal.   Nursing note and vitals reviewed. MDM       Procedures    No chief complaint on file. 2:44 PM  The patients presenting problems have been discussed, and they are in agreement with the care plan formulated and outlined with them. I have encouraged them to ask questions as they arise throughout their visit.     MEDICATIONS GIVEN:  Medications - No data to display    LABS REVIEWED:  Labs Reviewed - No data to display    RADIOLOGY RESULTS:  The following have been ordered and reviewed:  _____________________________________________________________________  _____________________________________________________________________    EKG interpretation:   Rhythm: n sinus bradycardia  rhythm; and regular . Rate (approx.): 60; Axis: normal; P wave: normal; QRS interval: normal ; ST/T wave: normal; Negative acute significant segmental elevations/ no old study for comparison    PROCEDURES:        CONSULTATIONS:       PROGRESS NOTES:      DIAGNOSIS:    1. Gastroesophageal reflux disease, esophagitis presence not specified        PLAN:  1- neg ed evaluation/ PCP follow-up/ Pt agrees;       ED COURSE: The patients hospital course has been uncomplicated. 4:55 PM  Mirian Simon's  results have been reviewed with her. She has been counseled regarding her diagnosis. She verbally conveys understanding and agreement of the signs, symptoms, diagnosis, treatment and prognosis and additionally agrees to Call/ Arrange follow up as recommended with Dr. Megan Bhandari, DO in 24 - 48 hours. She also agrees with the care-plan and conveys that all of her questions have been answered. I have also put together some discharge instructions for her that include: 1) educational information regarding their diagnosis, 2) how to care for their diagnosis at home, as well a 3) list of reasons why they would want to return to the ED prior to their follow-up appointment, should their condition change or for concerns.

## 2019-04-24 NOTE — ED TRIAGE NOTES
TRIAGE NOTE: Patient arrived from home with c/o generalized abdominal pain. Hx of H.pylori. \"it feels like when I had the h.pylori. \" c/o Burning in the stomach and constantly hungry.

## 2019-04-25 LAB
ATRIAL RATE: 51 BPM
CALCULATED P AXIS, ECG09: 41 DEGREES
CALCULATED R AXIS, ECG10: 22 DEGREES
CALCULATED T AXIS, ECG11: 14 DEGREES
DIAGNOSIS, 93000: NORMAL
P-R INTERVAL, ECG05: 164 MS
Q-T INTERVAL, ECG07: 466 MS
QRS DURATION, ECG06: 90 MS
QTC CALCULATION (BEZET), ECG08: 429 MS
VENTRICULAR RATE, ECG03: 51 BPM

## 2019-04-29 ENCOUNTER — HOSPITAL ENCOUNTER (OUTPATIENT)
Dept: PHYSICAL THERAPY | Age: 39
Discharge: HOME | End: 2019-04-29

## 2019-04-29 NOTE — PROGRESS NOTES
[facility]  [address] [facility]    OUTPATIENT physical Therapy      4/29/2019:  Yin Teresa was not seen on this date for physical therapy for the following reasons:    [x]     Patient called to cancel the visit for the following reasons: job    []     Patient missed the visit and did not call to cancel.     Vanesa Brito, PT

## 2019-06-27 ENCOUNTER — OFFICE VISIT (OUTPATIENT)
Dept: OBGYN CLINIC | Age: 39
End: 2019-06-27

## 2019-06-27 VITALS
HEIGHT: 71 IN | WEIGHT: 278 LBS | OXYGEN SATURATION: 99 % | SYSTOLIC BLOOD PRESSURE: 139 MMHG | BODY MASS INDEX: 38.92 KG/M2 | TEMPERATURE: 97.3 F | DIASTOLIC BLOOD PRESSURE: 90 MMHG | HEART RATE: 72 BPM | RESPIRATION RATE: 18 BRPM

## 2019-06-27 DIAGNOSIS — N89.8 VAGINAL DISCHARGE: Primary | ICD-10-CM

## 2019-06-27 DIAGNOSIS — E66.01 SEVERE OBESITY (HCC): ICD-10-CM

## 2019-06-27 DIAGNOSIS — R10.2 PELVIC PAIN: ICD-10-CM

## 2019-06-27 DIAGNOSIS — N89.8 VAGINAL ODOR: ICD-10-CM

## 2019-06-27 DIAGNOSIS — D17.9 MULTIPLE LIPOMAS: ICD-10-CM

## 2019-06-27 DIAGNOSIS — Z20.2 POSSIBLE EXPOSURE TO STD: ICD-10-CM

## 2019-06-27 DIAGNOSIS — Z12.31 VISIT FOR SCREENING MAMMOGRAM: ICD-10-CM

## 2019-06-27 RX ORDER — FLUCONAZOLE 150 MG/1
150 TABLET ORAL DAILY
Qty: 1 TAB | Refills: 0 | Status: SHIPPED | OUTPATIENT
Start: 2019-06-27 | End: 2019-06-28

## 2019-06-27 NOTE — PROGRESS NOTES
Vaginitis evaluation    Chief Complaint   Vaginitis      HPI  34 y.o. female complains of minimal vaginal discharge for 3 weeks. No LMP recorded. Patient with vaginal irritation. Mild odor. Discomfort vaginally for about 3 weeks    Also pain in panty line for about 3 weeks. Pain off and on since. No swelling. Feels deeper pain    She denies additional symptoms at this time. The patient denies aggravating factors. She is concerned about possible STI exposure at this time. She denies exposure to new chemicals or hygenic agents  Previous treatment included: none    Does have some discomfort from mild stress incontinence. Patient is due for mammogram    Past Medical History:   Diagnosis Date    Gastritis, Helicobacter pylori     Hypertension     Obesity      Past Surgical History:   Procedure Laterality Date    [facility]       Social History     Occupational History    Not on file   Tobacco Use    Smoking status: Former Smoker     Packs/day: 0.25     Last attempt to quit: 3/16/2018     Years since quittin.2    Smokeless tobacco: Never Used   Substance and Sexual Activity    Alcohol use: Yes     Comment: occ    Drug use: No    Sexual activity: Not Currently     Birth control/protection: Surgical     Family History   Problem Relation Age of Onset    Hypertension Mother     Hypertension Sister         No Known Allergies  Prior to Admission medications    Medication Sig Start Date End Date Taking? Authorizing Provider   naproxen (NAPROSYN) 250 mg tablet Take 250 mg by mouth two (2) times daily (with meals).    Yes Yair, MD Stas   clotrimazole-betamethasone (LOTRISONE) topical cream APPLY  CREAM TOPICALLY TO AFFECTED AREA TWICE DAILY 3/27/19  Yes Lolita Diaz NP   loperamide (ANTI-DIARRHEAL, LOPERAMIDE,) 2 mg capsule Take 1 Cap by mouth two (2) times daily as needed for Diarrhea. 3/21/19  Yes Audra Ram MD   amLODIPine (NORVASC) 10 mg tablet Take 1 Tab by mouth daily. 2/11/19  Yes Gaston Mahmood DO   traZODone (DESYREL) 50 mg tablet Take 1 Tab by mouth nightly. 6/11/18  Yes Sal Parikh DO   hydroCHLOROthiazide (HYDRODIURIL) 25 mg tablet Take 1 Tab by mouth daily. 1/14/19   Gaston Mahmood DO                      Review of Systems - History obtained from the patient  Constitutional: negative for weight loss, fever, night sweats  Breast: negative for breast lumps, nipple discharge, galactorrhea  GI: negative for change in bowel habits, abdominal pain, black or bloody stools  : negative for frequency, dysuria, hematuria  MSK: negative for back pain, joint pain, muscle pain  Skin: negative for itching, rash, hives  Neuro: negative for dizziness, headache, confusion, weakness  Psych: negative for anxiety, depression, change in mood  Heme/lymph: negative for bleeding, bruising, pallor       Objective:    Visit Vitals  /90 (BP 1 Location: Left arm, BP Patient Position: Sitting)   Pulse 72   Temp 97.3 °F (36.3 °C) (Oral)   Resp 18   Ht 5' 11\" (1.803 m)   Wt 278 lb (126.1 kg)   SpO2 99%   BMI 38.77 kg/m²       Physical Exam:   PHYSICAL EXAMINATION    Constitutional  · Appearance: well-nourished, well developed, alert, in no acute distress    HENT  · Head and Face: appears normal    Genitourinary  · External Genitalia: normal appearance for age, no discharge present, no tenderness present, no inflammatory lesions present, no masses present, no atrophy present  · Vagina:   Moderate white discharge present, otherwise normal vaginal vault without central or paravaginal defects, no inflammatory lesions present, no masses present  · Bladder: non-tender to palpation  · Urethra: appears normal  · Cervix: normal   · Uterus: normal size, shape and consistency  · Adnexa: no adnexal tenderness present, no adnexal masses present  · Perineum: perineum within normal limits, no evidence of trauma, no rashes or skin lesions present  · Anus: anus within normal limits, no hemorrhoids present  · Inguinal Lymph Nodes: no lymphadenopathy present    Skin  · General Inspection: no rash, no lesions identified  · 1 x 3 cm mobile mildly tender mass consistent with lipoma in left inguinal crease  · Large lipoma on left posterior neck    Neurologic/Psychiatric  · Mental Status:  · Orientation: grossly oriented to person, place and time  · Mood and Affect: mood normal, affect appropriate        No results found for any visits on 06/27/19. Assessment:   34 y.o. with vaginal discharge. Also needs    Plan:   - nuswab plus today  - mammogram   - will treat with diflucan  - referral to Gen Surg for lipomas    ROV prn if symptoms persist or worsen.

## 2019-06-27 NOTE — PROGRESS NOTES
Chief Complaint   Patient presents with    Vaginitis     Pt c/o vaginal irritation x 2 weeks and LLQ pain x 1 month. 3 most recent PHQ Screens 6/27/2019   Little interest or pleasure in doing things Not at all   Feeling down, depressed, irritable, or hopeless Not at all   Total Score PHQ 2 0     1. Have you been to the ER, urgent care clinic since your last visit? Hospitalized since your last visit? No    2. Have you seen or consulted any other health care providers outside of the [address] [place] [address] since your last visit? Include any pap smears or colon screening.  No

## 2019-06-27 NOTE — PATIENT INSTRUCTIONS
Learning About Breast Cancer Screening  What is breast cancer screening? Breast cancer occurs when cells that are not normal grow in one or both of your breasts. Screening tests can help find breast cancer early. Cancer is easier to treat when it's found early. Having concerns about breast cancer is common. That's why it's important to talk with your doctor about when to start and how often to get screened for breast cancer. How is breast cancer screening done? Several screening tests can be used to check for breast cancer. · Mammograms check for signs of cancer using X-rays. They can show tumors that are too small for you or your doctor to feel. During a mammogram, a machine squeezes your breasts to make them flatter and easier to X-ray. At least two pictures are taken of each breast. One is taken from the top and one from the side. · 3-D mammograms are also called digital breast tomosynthesis. Your breast is positioned on a flat plate. A top plate is pressed against your breast to keep it in position. The X-ray arm then moves in an arc above the breast and takes many pictures. A computer uses these X-rays to create a three-dimensional image. · Clinical breast exams are a doctor's exam. Your doctor carefully feels your breasts and under your arms to check for lumps or other changes. After the screening, your doctor will tell you the results. You will also be told if you need any follow-up tests. When should you get screened? Talk with your doctor about when you should start being tested for breast cancer. How often you get tested and the kind of tests you get will depend on your age and your risk. The guidelines that follow are for women who have an average risk for breast cancer. If you have a higher risk for breast cancer, such as having a family history of breast cancer in multiple relatives or at a young age, your doctor may recommend different screening for you.   · Ages 21 to 44: Some experts recommend that women have a clinical breast exam every 3 years, starting at age 21. Ask your doctor how often you should have this test. If you have a high risk for breast cancer, talk with your doctor about when to start yearly mammograms and other screening tests. · Ages 36 and older: Talk with your doctor about how often you should have mammograms and clinical breast exams. What is your risk for breast cancer? If you don't already know your risk of breast cancer, you can ask your doctor about it. You can also look it up at www.cancer.gov/bcrisktool/. If your doctor says that you have a high or very high risk, ask about ways to reduce your risk. These could include getting extra screening, taking medicine, or having surgery. If you have a strong family history of breast cancer, ask your doctor about genetic testing. What steps can you take to stay healthy? Some things that increase your risk of breast cancer, such as your age and being female, cannot be controlled. But you can do some things to stay as healthy as you can. · Learn what your breasts normally look and feel like. If you notice any changes, tell your doctor. · Drink alcohol wisely. Your risk goes up the more you drink. For the best health, women should have no more than 1 drink a day or 7 drinks a week. · If you smoke, quit. When you quit smoking, you lower your chances of getting many types of cancer. You can also do your best to eat well, be active, and stay at a healthy weight. Eating healthy foods and being active every day, as well as staying at a healthy weight, may help prevent cancer. Where can you learn more? Go to http://tanisha-verito.info/. Enter V442 in the search box to learn more about \"Learning About Breast Cancer Screening. \"  Current as of: March 27, 2018  Content Version: 11.9  © 7553-2164 [employer].  Care instructions adapted under license by [employer] (which disclaims liability or warranty for this information). If you have questions about a medical condition or this instruction, always ask your healthcare professional. [address] any warranty or liability for your use of this information. Lipoma: Care Instructions  Your Care Instructions  A lipoma is a growth of fat just below the skin. It may feel soft and rubbery. Lipomas can occur anywhere on the body. But they are most common on the torso, neck, upper thighs, upper arms, and armpits. A lipoma does not turn into cancer. Lipomas usually are not treated, because most of them don't hurt or cause problems. But your doctor may remove a lipoma if it is painful, gets infected, or bothers you. Follow-up care is a key part of your treatment and safety. Be sure to make and go to all appointments, and call your doctor if you are having problems. It's also a good idea to know your test results and keep a list of the medicines you take. How can you care for yourself at home? · A lipoma usually needs no care at home unless your doctor made a cut (incision) to remove it. · If your doctor told you how to care for your incision, follow your doctor's instructions. If you did not get instructions, follow this general advice:  ? Wash around the incision with clean water 2 times a day. Don't use hydrogen peroxide or alcohol. These can slow healing. ? You may cover the incision with a thin layer of petroleum jelly, such as Vaseline, and a nonstick bandage. ? Apply more petroleum jelly and replace the bandage as needed. When should you call for help? Call your doctor now or seek immediate medical care if:    · You have signs of infection, such as:  ? Increased pain, swelling, warmth, or redness. ? Red streaks leading from the lipoma. ? Pus draining from the lipoma. ?  A fever.    Watch closely for changes in your health, and be sure to contact your doctor if:    · The lipoma is growing or changing.     · You do not get better as expected. Where can you learn more? Go to http://tanisha-verito.info/. Enter B313 in the search box to learn more about \"Lipoma: Care Instructions. \"  Current as of: April 17, 2018  Content Version: 11.9  © 7380-9257 [employer]. Care instructions adapted under license by [employer] (which disclaims liability or warranty for this information). If you have questions about a medical condition or this instruction, always ask your healthcare professional. [address] any warranty or liability for your use of this information.

## 2019-07-02 LAB
A VAGINAE DNA VAG QL NAA+PROBE: ABNORMAL SCORE
BVAB2 DNA VAG QL NAA+PROBE: ABNORMAL SCORE
C ALBICANS DNA VAG QL NAA+PROBE: NEGATIVE
C GLABRATA DNA VAG QL NAA+PROBE: NEGATIVE
C TRACH RRNA SPEC QL NAA+PROBE: NEGATIVE
MEGA1 DNA VAG QL NAA+PROBE: ABNORMAL SCORE
N GONORRHOEA RRNA SPEC QL NAA+PROBE: NEGATIVE
T VAGINALIS RRNA SPEC QL NAA+PROBE: NEGATIVE

## 2019-07-03 RX ORDER — METRONIDAZOLE 500 MG/1
500 TABLET ORAL 2 TIMES DAILY
Qty: 14 TAB | Refills: 0 | Status: SHIPPED | OUTPATIENT
Start: 2019-07-03 | End: 2019-07-10

## 2019-07-03 NOTE — PROGRESS NOTES
Called pt advised her of all results and Rx sent to pharmacy on file pt was apprecative and verbalized understanding.

## 2019-07-15 ENCOUNTER — TELEPHONE (OUTPATIENT)
Dept: OBGYN CLINIC | Age: 39
End: 2019-07-15

## 2019-07-15 ENCOUNTER — PATIENT MESSAGE (OUTPATIENT)
Dept: OBGYN CLINIC | Age: 39
End: 2019-07-15

## 2019-07-15 RX ORDER — FLUCONAZOLE 150 MG/1
150 TABLET ORAL DAILY
Qty: 1 TAB | Refills: 0 | Status: SHIPPED | OUTPATIENT
Start: 2019-07-15 | End: 2019-08-15 | Stop reason: SDUPTHER

## 2019-07-15 NOTE — TELEPHONE ENCOUNTER
From: [facility]  To:  Ana Maria Cheney MD  Sent: 7/15/2019 8:20 AM EDT  Subject: Prescription Question    I have a question about NUSWAB VAGINITIS PLUS resulted on 7/2/19, 8:36 AM.  Can you please prescribe the pill for yeast infection as I always require it after taking this medication I can see the symptoms already I finished the medication yesterday so today would be great thanks,

## 2019-08-08 ENCOUNTER — DOCUMENTATION ONLY (OUTPATIENT)
Dept: OBGYN CLINIC | Age: 39
End: 2019-08-08

## 2019-08-08 NOTE — PROGRESS NOTES
Pt seen on 6/27/19 and offered Mirena as it was ordered in 2018. Pt refused. Cannot be returned as it has been over 7 mos since dispense.

## 2019-08-16 RX ORDER — FLUCONAZOLE 150 MG/1
TABLET ORAL
Qty: 1 TAB | Refills: 0 | Status: SHIPPED | OUTPATIENT
Start: 2019-08-16 | End: 2020-01-14 | Stop reason: ALTCHOICE

## 2019-09-10 RX ORDER — LOPERAMIDE HYDROCHLORIDE 2 MG/1
2 CAPSULE ORAL
Qty: 20 CAP | Refills: 3 | Status: SHIPPED | OUTPATIENT
Start: 2019-09-10 | End: 2020-01-29 | Stop reason: SDUPTHER

## 2020-01-14 ENCOUNTER — OFFICE VISIT (OUTPATIENT)
Dept: INTERNAL MEDICINE CLINIC | Age: 40
End: 2020-01-14

## 2020-01-14 VITALS
DIASTOLIC BLOOD PRESSURE: 62 MMHG | OXYGEN SATURATION: 99 % | HEIGHT: 71 IN | WEIGHT: 269.4 LBS | RESPIRATION RATE: 18 BRPM | BODY MASS INDEX: 37.72 KG/M2 | TEMPERATURE: 97.8 F | SYSTOLIC BLOOD PRESSURE: 92 MMHG | HEART RATE: 66 BPM

## 2020-01-14 DIAGNOSIS — G47.00 INSOMNIA, UNSPECIFIED TYPE: ICD-10-CM

## 2020-01-14 DIAGNOSIS — M54.9 UPPER BACK PAIN ON LEFT SIDE: Primary | ICD-10-CM

## 2020-01-14 DIAGNOSIS — E66.9 OBESITY (BMI 35.0-39.9 WITHOUT COMORBIDITY): ICD-10-CM

## 2020-01-14 DIAGNOSIS — I10 ESSENTIAL HYPERTENSION: ICD-10-CM

## 2020-01-14 DIAGNOSIS — Z12.31 ENCOUNTER FOR SCREENING MAMMOGRAM FOR BREAST CANCER: ICD-10-CM

## 2020-01-14 RX ORDER — TRAZODONE HYDROCHLORIDE 50 MG/1
50 TABLET ORAL
Qty: 90 TAB | Refills: 1 | Status: SHIPPED | OUTPATIENT
Start: 2020-01-14 | End: 2022-06-04

## 2020-01-14 RX ORDER — NAPROXEN 500 MG/1
500 TABLET ORAL 2 TIMES DAILY WITH MEALS
Qty: 30 TAB | Refills: 0 | Status: SHIPPED | OUTPATIENT
Start: 2020-01-14 | End: 2020-01-29

## 2020-01-14 RX ORDER — METHYLPREDNISOLONE 4 MG/1
TABLET ORAL
Qty: 1 DOSE PACK | Refills: 0 | Status: SHIPPED | OUTPATIENT
Start: 2020-01-14 | End: 2020-05-13 | Stop reason: ALTCHOICE

## 2020-01-14 NOTE — PROGRESS NOTES
Health Maintenance Due   Topic Date Due    DTaP/Tdap/Td series (1 - Tdap) 12/06/1989    Influenza Age 5 to Adult  08/01/2019       Chief Complaint   Patient presents with    Back Pain     x2 wks in upper back    Shoulder Pain     Left shoulder    Abdominal Cramping     Possible lactose intolerence     Diarrhea       1. Have you been to the ER, urgent care clinic since your last visit? Hospitalized since your last visit? No    2. Have you seen or consulted any other health care providers outside of the [address] [place] [address] since your last visit? Include any pap smears or colon screening. No    3) Do you have an Advance Directive on file? no    4) Are you interested in receiving information on Advance Directives? NO      Patient is accompanied by self I have received verbal consent from Nanci Piña to discuss any/all medical information while they are present in the room.

## 2020-01-14 NOTE — PROGRESS NOTES
HISTORY OF PRESENT ILLNESS  Олег Cooper is a 37 y.o. female. Pt. comes in for f/u. Has a few chronic medical issues as documented. BP is on the low side today but denies any related symptoms. She is obese but has lost weight over the last few months. Reports a few weeks of left upper back pain. No obvious trauma. 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 Also had a bumper-to-bumper accident recently with no significant injury. Continues to have issues with insomnia. Has been taking Advil PM.  Trazodone helped in the past.    PMH/PSH/Allergies/Social History/medication list and most recent studies reviewed with patient. Tobacco use: No.  Quit a few months ago  Alcohol use: Social    Reports compliance with medications and diet. Trying to be active physically to control weight. Reports no other new c/o. HPI    Review of Systems   Constitutional: Negative. HENT: Negative. Eyes: Negative. Respiratory: Negative for shortness of breath. Cardiovascular: Negative for chest pain and leg swelling. Gastrointestinal: Negative. Genitourinary: Negative for dysuria. Musculoskeletal: Positive for back pain. Negative for falls, joint pain, myalgias and neck pain. Skin: Negative. Neurological: Negative for dizziness and headaches. Endo/Heme/Allergies: Negative. Psychiatric/Behavioral: Negative for depression. The patient has insomnia. The patient is not nervous/anxious. All other systems reviewed and are negative. Physical Exam  Vitals signs and nursing note reviewed. Constitutional:       General: She is not in acute distress. Appearance: She is well-developed. Comments: Pleasant lady  Obese   HENT:      Head: Normocephalic and atraumatic.       Nose: Nose normal.      Mouth/Throat:      Mouth: Mucous membranes are moist.   Eyes:      Conjunctiva/sclera: Conjunctivae normal.   Neck:      Musculoskeletal: Normal range of motion and neck supple. Comments: Left neck/supraclavicular mass/lipoma  Cardiovascular:      Rate and Rhythm: Normal rate and regular rhythm. Heart sounds: Normal heart sounds. No murmur. Pulmonary:      Effort: Pulmonary effort is normal. No respiratory distress. Breath sounds: Normal breath sounds. Abdominal:      General: Bowel sounds are normal. There is no distension. Palpations: Abdomen is soft. Musculoskeletal: Normal range of motion. General: Tenderness (Left upper back/paraspinal muscles) present. No swelling or signs of injury. Skin:     General: Skin is warm and dry. Findings: No rash. Neurological:      Mental Status: She is alert and oriented to person, place, and time. Coordination: Coordination normal.   Psychiatric:         Behavior: Behavior normal.         ASSESSMENT and PLAN  Diagnoses and all orders for this visit:    1. Upper back pain on left side    2. Essential hypertension    3. Obesity (BMI 35.0-39.9 without comorbidity)    4. Insomnia, unspecified type    5. Encounter for screening mammogram for breast cancer  -     [facility] MAMMO BI SCREENING INCL CAD; Future    Other orders  -     naproxen (NAPROSYN) 500 mg tablet; Take 1 Tab by mouth two (2) times daily (with meals) for 15 days. -     methylPREDNISolone (MEDROL DOSEPACK) 4 mg tablet; As directed  -     traZODone (DESYREL) 50 mg tablet; Take 1 Tab by mouth nightly. Follow-up and Dispositions    · Return in about 6 months (around 7/14/2020).      lab results and schedule of future lab studies reviewed with patient  reviewed diet, exercise and weight control  reviewed medications and side effects in detail  Advised patient to lose weight by watching diet (decreasing sugars/carbs/fat, increasing fruits/vegetables), exercising at least 30 minutes daily, getting 7-8 hours of sleep daily, drinking plenty of water, and decreasing stress  Monitor BP at home with goal of 140/90 or less  Showed patient neck and upper back exercises to be done a few times during the day  Advised patient to apply heat to area of the pain

## 2020-01-21 ENCOUNTER — TELEPHONE (OUTPATIENT)
Dept: INTERNAL MEDICINE CLINIC | Age: 40
End: 2020-01-21

## 2020-01-21 DIAGNOSIS — M54.9 UPPER BACK PAIN ON LEFT SIDE: Primary | ICD-10-CM

## 2020-01-21 NOTE — TELEPHONE ENCOUNTER
----- Message from Ana rCuz sent at 1/21/2020 12:38 PM EST -----  Regarding: Dr. Cecelia Melo: [phone]  Caller's first and last name: Roby Ibarra  Reason for call: Pt wants instructions on pain management. Callback required yes/no and why: Yes  Best contact number(s): [phone]  Details to clarify the request: The pt has finished meds and still is experiencing pain in her back.

## 2020-01-23 NOTE — TELEPHONE ENCOUNTER
Called and verified pt with name and . Informed pt that Dr. Dwayne Conway is referring her to PT for her upper left back pain and that she can see ortho as well if she wishes. Per pt she'd like to start with PT. Order entered for [facility] facility. Pt was given phone number to schedule appt and the address as well. Pt verbalized understanding with no further questions at this time. Faxed referral to facility.

## 2020-01-29 ENCOUNTER — OFFICE VISIT (OUTPATIENT)
Dept: OBGYN CLINIC | Age: 40
End: 2020-01-29

## 2020-01-29 VITALS
WEIGHT: 266 LBS | DIASTOLIC BLOOD PRESSURE: 63 MMHG | BODY MASS INDEX: 37.24 KG/M2 | SYSTOLIC BLOOD PRESSURE: 109 MMHG | HEART RATE: 74 BPM | RESPIRATION RATE: 18 BRPM | HEIGHT: 71 IN

## 2020-01-29 DIAGNOSIS — R32 INCONTINENCE IN FEMALE: ICD-10-CM

## 2020-01-29 DIAGNOSIS — Z01.419 ENCOUNTER FOR GYNECOLOGICAL EXAMINATION WITHOUT ABNORMAL FINDING: Primary | ICD-10-CM

## 2020-01-29 DIAGNOSIS — R19.5 LOOSE STOOLS: ICD-10-CM

## 2020-01-29 DIAGNOSIS — Z30.09 FAMILY PLANNING COUNSELING: ICD-10-CM

## 2020-01-29 RX ORDER — LOPERAMIDE HYDROCHLORIDE 2 MG/1
2 CAPSULE ORAL
Qty: 20 CAP | Refills: 3 | Status: SHIPPED | OUTPATIENT
Start: 2020-01-29 | End: 2020-10-28 | Stop reason: SDUPTHER

## 2020-01-29 NOTE — PROGRESS NOTES
Annual exam ages 40-58    Warden Thornton is a No obstetric history on file. ,  37 y.o. female [address] Patient's last menstrual period was 01/15/2020. Barry Junk She presents for her annual checkup. She is having issues with bowels (very malodorous with dairy). Very loose stools. Improved with immodium. Has recently been trying to lose weight. Has just lost 10 lbs since last PCP visit. Went to see Dietician.       Patient with intermittent leakage of urine with coughing or laughing or exercise. Has been to pelvic PT. Currently tolerable    Patient with no increased discharge recently. With regard to the Gardasil vaccine, she is older than the age for which it is FDA approved. Menstrual status:    Periods last 5 days. Around 3.5-4 weeks between periods. They are very heavy. Very painful. Has to use tampons and pad to make sure that she does not bleed through. Sometimes has large clots. No bleeding between periods. Currently managable. She denies dysmenorrhea. She reports no premenstrual symptoms. Contraception:    The current method of family planning is tubal ligation. Sexual history:    She  reports previously being sexually active. She reports using the following method of birth control/protection: Surgical.    Medical conditions:    Since her last annual GYN exam about one year ago, she has not the following changes in her health history: none. Pap and Mammogram History:    Her most recent Pap smear was 2018 - normal.  Hx of abnormal pap smear with cryo around 2011     The patient with mammogram in 2018. Scheduled for mammogram 2/12/20. Breast Cancer History/Substance Abuse: negative    Osteoporosis History:    Family history does not include a first or second degree relative with osteopenia or osteoporosis. She is currently taking calcium and vit D.     Past Medical History:   Diagnosis Date    Gastritis, Helicobacter pylori     Hypertension     Obesity Past Surgical History:   Procedure Laterality Date    HX TUBAL LIGATION  2000    LAP,TUBAL CAUTERY  2000       Current Outpatient Medications   Medication Sig Dispense Refill    loperamide (ANTI-DIARRHEAL, LOPERAMIDE,) 2 mg capsule Take 1 Cap by mouth two (2) times daily as needed for Diarrhea. 20 Cap 3    naproxen (NAPROSYN) 500 mg tablet Take 1 Tab by mouth two (2) times daily (with meals) for 15 days. 30 Tab 0    traZODone (DESYREL) 50 mg tablet Take 1 Tab by mouth nightly. 90 Tab 1    hydroCHLOROthiazide (HYDRODIURIL) 25 mg tablet TAKE 1 TABLET BY MOUTH ONCE DAILY 30 Tab 0    amLODIPine (NORVASC) 10 mg tablet Take 1 Tab by mouth daily. 30 Tab 0    methylPREDNISolone (MEDROL DOSEPACK) 4 mg tablet As directed (Patient not taking: Reported on 1/29/2020) 1 Dose Pack 0     Allergies: Patient has no known allergies. Tobacco History:  reports that she quit smoking about 22 months ago. She smoked 0.25 packs per day. She has never used smokeless tobacco.  No smoking for 2 years. Alcohol Abuse:  reports current alcohol use. Drug Abuse:  reports no history of drug use.     Patient feels safe at home    Family Medical/Cancer History:   Family History   Problem Relation Age of Onset    Hypertension Mother     Hypertension Sister         Review of Systems - History obtained from the patient  Constitutional: negative for weight loss, fever, night sweats  HEENT: negative for hearing loss, earache, congestion, snoring, sorethroat  CV: negative for chest pain, palpitations, edema  Resp: negative for cough, shortness of breath, wheezing  GI: negative for change in bowel habits, abdominal pain, black or bloody stools  : negative for frequency, dysuria, hematuria, vaginal discharge  MSK: negative for back pain, joint pain, muscle pain  Breast: negative for breast lumps, nipple discharge, galactorrhea  Skin :negative for itching, rash, hives  Neuro: negative for dizziness, headache, confusion, weakness  Psych: negative for anxiety, depression, change in mood  Heme/lymph: negative for bleeding, bruising, pallor    Physical Exam    Visit Vitals  /63 (BP 1 Location: Right arm, BP Patient Position: Sitting)   Pulse 74   Resp 18   Ht 5' 11\" (1.803 m)   Wt 266 lb (120.7 kg)   LMP 01/15/2020   BMI 37.10 kg/m²       Constitutional  · Appearance: well-nourished, well developed, alert, in no acute distress    HENT  · Head and Face: appears normal    Neck  · Inspection/Palpation: normal appearance, no masses or tenderness  · Lymph Nodes: no lymphadenopathy present  · Thyroid: gland size normal, nontender, no nodules or masses present on palpation    Chest  · Respiratory Effort: breathing unlabored  · Auscultation: normal breath sounds    Cardiovascular  · Heart:  · Auscultation: regular rate and rhythm without murmur    Breasts  · Inspection of Breasts: breasts symmetrical, no skin changes, no discharge present, nipple appearance normal, no skin retraction present  · Palpation of Breasts and Axillae: no masses present on palpation, no breast tenderness  · Axillary Lymph Nodes: no lymphadenopathy present    Gastrointestinal  · Abdominal Examination: abdomen non-tender to palpation, normal bowel sounds, no masses present  · Liver and spleen: no hepatomegaly present, spleen not palpable  · Hernias: no hernias identified    Genitourinary  · External Genitalia: normal appearance for age, no discharge present, no tenderness present, no inflammatory lesions present, no masses present, no atrophy present  · Vagina: normal vaginal vault without central or paravaginal defects, no discharge present, no inflammatory lesions present, no masses present  · Bladder: non-tender to palpation  · Urethra: appears normal  · Cervix: normal   · Uterus: normal size, shape and consistency  · Adnexa: no adnexal tenderness present, no adnexal masses present  · Perineum: perineum within normal limits, no evidence of trauma, no rashes or skin lesions present  · Anus: anus within normal limits, no hemorrhoids present  · Inguinal Lymph Nodes: no lymphadenopathy present    Skin  · General Inspection: no rash, no lesions identified    Neurologic/Psychiatric  · Mental Status:  · Orientation: grossly oriented to person, place and time  · Mood and Affect: mood normal, affect appropriate    Assessment:  Routine gynecologic examination  Her current medical status is satisfactory with no evidence of significant gynecologic issues. Plan:  - pap smear up to date  - mammogram scheduled  - immodium for loose stools.   Will refer to GI to discuss problem  - will monitor bleeding  - will monitor urination  - tubal ligation for contraception    Counseled re: diet, exercise, healthy lifestyle  Return for yearly wellness visits  Rec annual mammogram

## 2020-01-29 NOTE — PATIENT INSTRUCTIONS
Well Visit, Ages 25 to 48: Care Instructions  Your Care Instructions    Physical exams can help you stay healthy. Your doctor has checked your overall health and may have suggested ways to take good care of yourself. He or she also may have recommended tests. At home, you can help prevent illness with healthy eating, regular exercise, and other steps. Follow-up care is a key part of your treatment and safety. Be sure to make and go to all appointments, and call your doctor if you are having problems. It's also a good idea to know your test results and keep a list of the medicines you take. How can you care for yourself at home? · Reach and stay at a healthy weight. This will lower your risk for many problems, such as obesity, diabetes, heart disease, and high blood pressure. · Get at least 30 minutes of physical activity on most days of the week. Walking is a good choice. You also may want to do other activities, such as running, swimming, cycling, or playing tennis or team sports. Discuss any changes in your exercise program with your doctor. · Do not smoke or allow others to smoke around you. If you need help quitting, talk to your doctor about stop-smoking programs and medicines. These can increase your chances of quitting for good. · Talk to your doctor about whether you have any risk factors for sexually transmitted infections (STIs). Having one sex partner (who does not have STIs and does not have sex with anyone else) is a good way to avoid these infections. · Use birth control if you do not want to have children at this time. Talk with your doctor about the choices available and what might be best for you. · Protect your skin from too much sun. When you're outdoors from 10 a.m. to 4 p.m., stay in the shade or cover up with clothing and a hat with a wide brim. Wear sunglasses that block UV rays. Even when it's cloudy, put broad-spectrum sunscreen (SPF 30 or higher) on any exposed skin.   · See a dentist one or two times a year for checkups and to have your teeth cleaned. · Wear a seat belt in the car. Follow your doctor's advice about when to have certain tests. These tests can spot problems early. For everyone  · Cholesterol. Have the fat (cholesterol) in your blood tested after age 21. Your doctor will tell you how often to have this done based on your age, family history, or other things that can increase your risk for heart disease. · Blood pressure. Have your blood pressure checked during a routine doctor visit. Your doctor will tell you how often to check your blood pressure based on your age, your blood pressure results, and other factors. · Vision. Talk with your doctor about how often to have a glaucoma test.  · Diabetes. Ask your doctor whether you should have tests for diabetes. · Colon cancer. Your risk for colorectal cancer gets higher as you get older. Some experts say that adults should start regular screening at age 48 and stop at age 76. Others say to start before age 48 or continue after age 76. Talk with your doctor about your risk and when to start and stop screening. For women  · Breast exam and mammogram. Talk to your doctor about when you should have a clinical breast exam and a mammogram. Medical experts differ on whether and how often women under 50 should have these tests. Your doctor can help you decide what is right for you. · Cervical cancer screening test and pelvic exam. Begin with a Pap test at age 24. The test often is part of a pelvic exam. Starting at age 27, you may choose to have a Pap test, an HPV test, or both tests at the same time (called co-testing). Talk with your doctor about how often to have testing. · Tests for sexually transmitted infections (STIs). Ask whether you should have tests for STIs. You may be at risk if you have sex with more than one person, especially if your partners do not wear condoms.   For men  · Tests for sexually transmitted infections (STIs). Ask whether you should have tests for STIs. You may be at risk if you have sex with more than one person, especially if you do not wear a condom. · Testicular cancer exam. Ask your doctor whether you should check your testicles regularly. · Prostate exam. Talk to your doctor about whether you should have a blood test (called a PSA test) for prostate cancer. Experts differ on whether and when men should have this test. Some experts suggest it if you are older than 39 and are -American or have a father or brother who got prostate cancer when he was younger than 72. When should you call for help? Watch closely for changes in your health, and be sure to contact your doctor if you have any problems or symptoms that concern you. Where can you learn more? Go to http://tanisha-verito.info/. Enter P072 in the search box to learn more about \"Well Visit, Ages 25 to 48: Care Instructions. \"  Current as of: December 13, 2018  Content Version: 12.2  © 7899-6393 [employer]. Care instructions adapted under license by [employer] (which disclaims liability or warranty for this information). If you have questions about a medical condition or this instruction, always ask your healthcare professional. [place] [address] any warranty or liability for your use of this information.

## 2020-01-29 NOTE — PROGRESS NOTES
Chief Complaint   Patient presents with    Well Woman     Pap 4/2018.  Mammogram scheduled 2/2020.    3 most recent PHQ Screens 1/14/2020   Little interest or pleasure in doing things Not at all   Feeling down, depressed, irritable, or hopeless Not at all   Total Score PHQ 2 0

## 2020-02-12 ENCOUNTER — TELEPHONE (OUTPATIENT)
Dept: INTERNAL MEDICINE CLINIC | Age: 40
End: 2020-02-12

## 2020-02-12 ENCOUNTER — HOSPITAL ENCOUNTER (OUTPATIENT)
Dept: MAMMOGRAPHY | Age: 40
Discharge: HOME | End: 2020-02-12
Attending: INTERNAL MEDICINE
Payer: SELF-PAY

## 2020-02-12 DIAGNOSIS — Z12.31 ENCOUNTER FOR SCREENING MAMMOGRAM FOR BREAST CANCER: ICD-10-CM

## 2020-02-12 DIAGNOSIS — M54.9 PAIN, UPPER BACK: Primary | ICD-10-CM

## 2020-02-12 PROCEDURE — 77067 SCR MAMMO BI INCL CAD: CPT

## 2020-02-12 NOTE — TELEPHONE ENCOUNTER
Patient would like an order for an xray of her back. She wants to do this before she spends any money on going to physical therapy. Please call her back at [phone]

## 2020-02-17 ENCOUNTER — HOSPITAL ENCOUNTER (OUTPATIENT)
Dept: GENERAL RADIOLOGY | Age: 40
Discharge: HOME | End: 2020-02-17
Payer: SELF-PAY

## 2020-02-17 ENCOUNTER — TELEPHONE (OUTPATIENT)
Dept: INTERNAL MEDICINE CLINIC | Age: 40
End: 2020-02-17

## 2020-02-17 DIAGNOSIS — M54.9 PAIN, UPPER BACK: ICD-10-CM

## 2020-02-17 PROCEDURE — 72072 X-RAY EXAM THORAC SPINE 3VWS: CPT

## 2020-02-17 NOTE — TELEPHONE ENCOUNTER
Conferred with provider:  Is her pain still in Left upper back?    Get Thoracic spine xray but most likely pain is muscular   Nees to get PT if still has pain despite meds

## 2020-02-17 NOTE — TELEPHONE ENCOUNTER
Pt states she had an Xray today. Requesting a note for work stating her back issues, she's been having.   Pls call

## 2020-02-18 ENCOUNTER — TELEPHONE (OUTPATIENT)
Dept: INTERNAL MEDICINE CLINIC | Age: 40
End: 2020-02-18

## 2020-02-18 NOTE — LETTER
NOTIFICATION RETURN TO WORK / SCHOOL 
 
2/19/2020 11:17 AM 
 
Ms. Apryl Thakur [address] [phone] To Whom It May Concern: 
 
Apryl Thakur is currently under the care of [address]. She will return to work/school on: 02/20/2020, please excuse her from work from 02/02/2020 thru 02/19/2020 may return to work 02/20/2020 If there are questions or concerns please have the patient contact our office.  
 
 
 
Sincerely, 
 
 
Evangelista Gonzalez, DO

## 2020-02-18 NOTE — TELEPHONE ENCOUNTER
Pt called and asked that nurse f/u with her re: xray results. Pt also wants letter for work. Asked if we could put a rush on it as she has been out of work unexcused and its been a week and she has gotten no response from us.

## 2020-02-19 NOTE — TELEPHONE ENCOUNTER
Call placed to patient and made aware tof recent imaging results and that her work letter is available, requested to have note scanned to her chart and sent in a my chart message

## 2020-02-25 NOTE — PROGRESS NOTES
Stable labs, letter mailed to patient  Spoke with patient after verifying name and . Notified patient of lab results and recommendation from provider. Patient verbalized understanding and given a chance to ask questions.

## 2020-02-27 RX ORDER — METRONIDAZOLE 500 MG/1
500 TABLET ORAL 2 TIMES DAILY
Qty: 14 TAB | Refills: 0 | Status: SHIPPED | OUTPATIENT
Start: 2020-02-27 | End: 2020-03-05

## 2020-03-02 ENCOUNTER — HOSPITAL ENCOUNTER (OUTPATIENT)
Dept: MAMMOGRAPHY | Age: 40
Discharge: HOME | End: 2020-03-02
Attending: OBSTETRICS & GYNECOLOGY
Payer: MEDICAID

## 2020-03-02 ENCOUNTER — HOSPITAL ENCOUNTER (OUTPATIENT)
Dept: MAMMOGRAPHY | Age: 40
Discharge: HOME | End: 2020-03-02
Attending: INTERNAL MEDICINE
Payer: MEDICAID

## 2020-03-02 DIAGNOSIS — R92.8 ABNORMAL MAMMOGRAM: ICD-10-CM

## 2020-03-02 PROCEDURE — 76642 ULTRASOUND BREAST LIMITED: CPT

## 2020-03-02 PROCEDURE — 77065 DX MAMMO INCL CAD UNI: CPT

## 2020-03-18 RX ORDER — AMLODIPINE BESYLATE 10 MG/1
TABLET ORAL
Qty: 30 TAB | Refills: 0 | Status: CANCELLED | OUTPATIENT
Start: 2020-03-18

## 2020-03-18 NOTE — TELEPHONE ENCOUNTER
Pt wants to know if and when she needs her next TB test.  Pt referred to local pharmacy for flu shot

## 2020-03-19 RX ORDER — AMLODIPINE BESYLATE 10 MG/1
TABLET ORAL
Qty: 90 TAB | Refills: 0 | Status: SHIPPED | OUTPATIENT
Start: 2020-03-19 | End: 2020-07-20

## 2020-03-19 RX ORDER — HYDROCHLOROTHIAZIDE 25 MG/1
TABLET ORAL
Qty: 30 TAB | Refills: 0 | Status: SHIPPED | OUTPATIENT
Start: 2020-03-19 | End: 2020-04-17

## 2020-03-24 ENCOUNTER — TELEPHONE (OUTPATIENT)
Dept: OBGYN CLINIC | Age: 40
End: 2020-03-24

## 2020-03-24 RX ORDER — FLUCONAZOLE 150 MG/1
150 TABLET ORAL
Qty: 2 TAB | Refills: 0 | Status: SHIPPED | OUTPATIENT
Start: 2020-03-24 | End: 2020-03-28

## 2020-04-17 RX ORDER — HYDROCHLOROTHIAZIDE 25 MG/1
TABLET ORAL
Qty: 30 TAB | Refills: 0 | Status: SHIPPED | OUTPATIENT
Start: 2020-04-17 | End: 2020-05-13 | Stop reason: SDUPTHER

## 2020-05-13 ENCOUNTER — VIRTUAL VISIT (OUTPATIENT)
Dept: INTERNAL MEDICINE CLINIC | Age: 40
End: 2020-05-13

## 2020-05-13 VITALS — BODY MASS INDEX: 37.8 KG/M2 | HEIGHT: 71 IN | WEIGHT: 270 LBS

## 2020-05-13 DIAGNOSIS — M76.891 TENDINITIS OF RIGHT KNEE: ICD-10-CM

## 2020-05-13 DIAGNOSIS — E66.9 OBESITY (BMI 35.0-39.9 WITHOUT COMORBIDITY): ICD-10-CM

## 2020-05-13 DIAGNOSIS — I10 ESSENTIAL HYPERTENSION: ICD-10-CM

## 2020-05-13 DIAGNOSIS — M77.8 TENDINITIS OF THUMB: Primary | ICD-10-CM

## 2020-05-13 RX ORDER — HYDROCHLOROTHIAZIDE 25 MG/1
TABLET ORAL
Qty: 90 TAB | Refills: 1 | Status: SHIPPED | OUTPATIENT
Start: 2020-05-13 | End: 2020-11-30 | Stop reason: SDUPTHER

## 2020-05-13 RX ORDER — ACETAMINOPHEN 325 MG/1
650 TABLET ORAL
Qty: 90 TAB | Refills: 0 | Status: SHIPPED | OUTPATIENT
Start: 2020-05-13 | End: 2020-08-27

## 2020-05-13 RX ORDER — LIDOCAINE 50 MG/G
PATCH TOPICAL
COMMUNITY
Start: 2020-04-01 | End: 2022-06-04

## 2020-05-13 RX ORDER — PREDNISONE 10 MG/1
10 TABLET ORAL SEE ADMIN INSTRUCTIONS
Qty: 21 TAB | Refills: 0 | Status: SHIPPED | OUTPATIENT
Start: 2020-05-13 | End: 2020-08-27

## 2020-05-13 NOTE — PROGRESS NOTES
Angeline Mariee is a 37 y.o. female who was seen by synchronous (real-time) audio-video technology on 5/13/2020. Consent: Angeline Mariee, who was seen by synchronous (real-time) audio-video technology, and/or her healthcare decision maker, is aware that this patient-initiated, Telehealth encounter on 5/13/2020 is a billable service, with coverage as determined by her insurance carrier. She is aware that she may receive a bill and has provided verbal consent to proceed: Yes. Assessment & Plan:   Diagnoses and all orders for this visit:    1. Tendinitis of thumb  -     predniSONE (STERAPRED DS) 10 mg dose pack; Take 1 Tab by mouth See Admin Instructions. See administration instruction per 10mg dose pack  -     acetaminophen (TYLENOL) 325 mg tablet; Take 2 Tabs by mouth every four (4) hours as needed for Pain. 2. Essential hypertension  -     hydroCHLOROthiazide (HYDRODIURIL) 25 mg tablet; TAKE 1 TABLET BY MOUTH ONCE DAILY (APPOINTMENT REQUIRED FOR FUTURE REFILLS)    3. Obesity (BMI 35.0-39.9 without comorbidity)    4. Tendinitis of right knee          Subjective:   Angeline Mariee is a 37 y.o. female who was seen for Numbness (left thumb x 1 week)    Patient with a history of obesity, chronic pain. Patient was seen today via video for pain, numbness of the left thumb. Patient reports that it has gradually gotten worse since she picked up on her work. 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 Reports that in the past she was able to rest it and it got better. Rates that pain a 7/10. Denies loss of function or loss of sensation     Knee pain; reports chronic pain to the knee. Takes tylenol as needed     Obesity: is attempting to loose weight. Watching her diet     Prior to Admission medications    Medication Sig Start Date End Date Taking?  Authorizing Provider   IBUPROFEN  mg. 3/24/19  Yes Provider, Historical   hydroCHLOROthiazide (HYDRODIURIL) 25 mg tablet TAKE 1 TABLET BY MOUTH ONCE DAILY (APPOINTMENT REQUIRED FOR FUTURE REFILLS) 4/17/20  Yes Kris Boyce, NP   amLODIPine (NORVASC) 10 mg tablet Take 1 tablet by mouth once daily 3/19/20  Yes Sal Parikh DO   loperamide (ANTI-DIARRHEAL, LOPERAMIDE,) 2 mg capsule Take 1 Cap by mouth two (2) times daily as needed for Diarrhea. 1/29/20  Yes Dionne Longoria MD   traZODone (DESYREL) 50 mg tablet Take 1 Tab by mouth nightly. 1/14/20  Yes Sal Parikh DO   lidocaine (LIDODERM) 5 % APPLY ONE PATCH TOPICALLY TO CLEAN DRY SKIN. LEAVE ON FOR 12 HOURS THEN REMOVE. MUST WAIT AT LEAST 12 HOURS BEFORE APPLYING PATCH(ES) AGAIN. 4/1/20   Provider, Historical   methylPREDNISolone (MEDROL DOSEPACK) 4 mg tablet As directed  Patient not taking: Reported on 1/29/2020 1/14/20 5/13/20  Tami Sofia, DO     No Known Allergies        Review of Systems   Constitutional: Negative for chills and fever. Respiratory: Negative. Cardiovascular: Negative. Gastrointestinal: Negative. Musculoskeletal: Positive for back pain and joint pain. Negative for falls. Neurological: Negative. Psychiatric/Behavioral: Negative.         Objective:   Vital Signs: (As obtained by patient/caregiver at home)  Visit Vitals  Ht 5' 11\" (1.803 m)   Wt 270 lb (122.5 kg)   BMI 37.66 kg/m²        [INSTRUCTIONS:  \"[x]\" Indicates a positive item  \"[]\" Indicates a negative item  -- DELETE ALL ITEMS NOT EXAMINED]    Constitutional: [x] Appears well-developed and well-nourished [x] No apparent distress      [] Abnormal -     Mental status: [x] Alert and awake  [x] Oriented to person/place/time [x] Able to follow commands    [] Abnormal -     Eyes:   EOM    [x]  Normal    [] Abnormal -   Sclera  [x]  Normal    [] Abnormal -          Discharge [x]  None visible   [] Abnormal -     HENT: [x] Normocephalic, atraumatic  [] Abnormal -   [x] Mouth/Throat: Mucous membranes are moist    External Ears [x] Normal  [] Abnormal -    Neck: [x] No visualized mass [] Abnormal -     Pulmonary/Chest: [x] Respiratory effort normal   [x] No visualized signs of difficulty breathing or respiratory distress        [] Abnormal -      Musculoskeletal:   [x] Normal gait with no signs of ataxia         [x] Normal range of motion of neck        [] Abnormal -     Neurological:        [x] No Facial Asymmetry (Cranial nerve 7 motor function) (limited exam due to video visit)          [x] No gaze palsy        [] Abnormal -          Skin:        [x] No significant exanthematous lesions or discoloration noted on facial skin         [] Abnormal -            Psychiatric:       [x] Normal Affect [] Abnormal -        [x] No Hallucinations    Other pertinent observable physical exam findings:-        We discussed the expected course, resolution and complications of the diagnosis(es) in detail. Medication risks, benefits, costs, interactions, and alternatives were discussed as indicated. I advised her to contact the office if her condition worsens, changes or fails to improve as anticipated. She expressed understanding with the diagnosis(es) and plan. Paz Rowan is a 37 y.o. female who was evaluated by a video visit encounter for concerns as above. Patient identification was verified prior to start of the visit. A caregiver was present when appropriate. Due to this being a TeleHealth encounter (During XOFDD-91 public health emergency), evaluation of the following organ systems was limited: Vitals/Constitutional/EENT/Resp/CV/GI//MS/Neuro/Skin/Heme-Lymph-Imm. Pursuant to the emergency declaration under the [facility], 1135 waiver authority and the [employer] General Act, this Virtual  Visit was conducted, with patient's (and/or legal guardian's) consent, to reduce the patient's risk of exposure to COVID-19 and provide necessary medical care.      Services were provided through a video synchronous discussion virtually to substitute for in-person clinic visit. Patient and provider were located at their individual homes.       Malik Stanley NP

## 2020-05-13 NOTE — PROGRESS NOTES
Raquel Velasquez is a 37 y.o. female  Visit [facility] 5' 11\" (1.803 m)   Wt 270 lb (122.5 kg)   BMI 37.66 kg/m²     1. Have you been to the ER, urgent care clinic since your last visit? Hospitalized since your last visit? No    2. Have you seen or consulted any other health care providers outside of the [address] [place] [address] since your last visit? Include any pap smears or colon screening.  No

## 2020-05-18 ENCOUNTER — TELEPHONE (OUTPATIENT)
Dept: INTERNAL MEDICINE CLINIC | Age: 40
End: 2020-05-18

## 2020-05-18 DIAGNOSIS — M79.643 PAIN OF HAND, UNSPECIFIED LATERALITY: ICD-10-CM

## 2020-05-18 DIAGNOSIS — M77.9 TENDINITIS: Primary | ICD-10-CM

## 2020-05-18 NOTE — TELEPHONE ENCOUNTER
Left hand pain going up to wrist. Whole hand feeling numb and moving up her arm. Pt wants to know if an x-ray can be ordered.  She has finished the sterriod and it did not help anything

## 2020-05-20 NOTE — TELEPHONE ENCOUNTER
Per Dr. Erwin Campbell like a tendinitis therefore an x-ray would not show anything   Needs to be examined and may benefit from a cortisone injection   Tell her to take ibuprofen or Aleve for pain   [facility]    Called the pt and after verifying HIPAA advised her of this information. The pt voiced thanks and understanding and requested that the info be sent to her in a patient message and also that a referral be mailed to her.

## 2020-07-20 RX ORDER — AMLODIPINE BESYLATE 10 MG/1
TABLET ORAL
Qty: 90 TAB | Refills: 0 | Status: SHIPPED | OUTPATIENT
Start: 2020-07-20 | End: 2020-10-23 | Stop reason: SDUPTHER

## 2020-07-22 LAB — SARS-COV-2, NAA: NOT DETECTED

## 2020-08-26 ENCOUNTER — TELEPHONE (OUTPATIENT)
Dept: OBGYN CLINIC | Age: 40
End: 2020-08-26

## 2020-08-26 NOTE — TELEPHONE ENCOUNTER
Call received lea 11:40am      37year old patient last seen in the office on 1/29/2020 for AE with Dr Melvin Banuelos. Patient advised of office changes. Patient calling to ask for a prescription for a yeast infection. Patient advised of need to be seen by a provided to establish care. Patient reports vaginal discharge and itching and was placed on the schedule to be seen in the office tomorrow at 3:10PM    Patient was provided with office location details and instructions related to covid 19 and to wear a mask.     Patient verbalized understanding

## 2020-08-27 ENCOUNTER — OFFICE VISIT (OUTPATIENT)
Dept: OBGYN CLINIC | Age: 40
End: 2020-08-27
Payer: MEDICAID

## 2020-08-27 VITALS
HEIGHT: 71 IN | SYSTOLIC BLOOD PRESSURE: 118 MMHG | DIASTOLIC BLOOD PRESSURE: 74 MMHG | WEIGHT: 283 LBS | BODY MASS INDEX: 39.62 KG/M2

## 2020-08-27 DIAGNOSIS — N89.8 VAGINAL DISCHARGE: Primary | ICD-10-CM

## 2020-08-27 DIAGNOSIS — Z11.3 SCREENING FOR STD (SEXUALLY TRANSMITTED DISEASE): ICD-10-CM

## 2020-08-27 PROCEDURE — 99214 OFFICE O/P EST MOD 30 MIN: CPT | Performed by: OBSTETRICS & GYNECOLOGY

## 2020-08-27 RX ORDER — FLUCONAZOLE 150 MG/1
150 TABLET ORAL DAILY
Qty: 1 TAB | Refills: 0 | Status: SHIPPED | OUTPATIENT
Start: 2020-08-27 | End: 2020-08-28

## 2020-08-27 NOTE — PATIENT INSTRUCTIONS
Pelvic Exam: Care Instructions  Your Care Instructions     When your doctor examines all of your pelvic organs, it's called a pelvic exam. Two good reasons to have this kind of exam are to check for sexually transmitted infections (STIs) and to get a Pap test. A Pap test is also called a Pap smear. It checks for early changes that can lead to cancer of the cervix. Sometimes a pelvic exam is part of a regular checkup. Your doctor may ask you to avoid vaginal sex, tampons, vaginal medicines, vaginal sprays or powders, and douching for 1 to 2 days before the test.  Other times, women have this kind of exam at any time of the month. This is because they have pelvic pain, bleeding, or discharge. Or they may have another pelvic problem. Before your exam, it's important to share some information with your doctor. For example, if you are a survivor of rape or sexual abuse, you can talk about any concerns you may have. Your doctor will also want to know if you are pregnant or use birth control. And he or she will want to hear about any problems, surgeries, or procedures you have had in your pelvic area. You will also need to tell your doctor when your last period was. Follow-up care is a key part of your treatment and safety. Be sure to make and go to all appointments, and call your doctor if you are having problems. It's also a good idea to know your test results and keep a list of the medicines you take. How is a pelvic exam done? · During a pelvic exam, you will:  ? Take off your clothes below the waist. You will get a paper or cloth cover to put over the lower half of your body. If this is regular checkup, you may undress completely and put on a gown. ? Lie on your back on an exam table. Your feet will be raised above you. Stirrups will support your feet. · The doctor will:  ? Ask you to relax your knees. Your knees need to lean out, toward the walls. ?  Check the opening of your vagina for sores or swelling. ? Gently put a tool called a speculum into your vagina. It opens the vagina a little bit. You will feel some pressure. But if you are relaxed, it will not hurt. It lets your doctor see inside the vagina. ? Use a small brush, spatula, or swab to get a sample of cells, if you are having a Pap test or culture. The doctor then removes the speculum. ? Put on gloves and put one or two fingers of one hand into your vagina. The other hand goes on your lower belly. This lets your doctor feel your pelvic organs. You will probably feel some pressure. Try to stay relaxed. ? Put one gloved finger into your rectum and one into your vagina, if needed. This can also help check your pelvic organs. This exam takes about 10 minutes. At the end, you will get a washcloth or tissue to clean your vaginal area. You can then get dressed. Why is a pelvic exam done? A pelvic exam may be done:  · As part of a woman's regular physical checkup. The exam may include a Pap test.  · To check for vaginal infection. · To check for sexually transmitted infections, such as chlamydia or herpes. · To help find the cause of abnormal uterine bleeding. · To look for problems like uterine fibroids, ovarian cysts, or uterine prolapse. · To find the cause of pelvic or belly pain. · Before inserting an intrauterine device (IUD) for birth control. · To collect evidence if you've been sexually assaulted. What are the risks of a pelvic exam?  There is a small chance that the doctor will find something on a pelvic exam that would not have caused a problem. This is called overdiagnosis. It could lead to tests or treatment you don't need. When should you call for help? Watch closely for changes in your health, and be sure to contact your doctor if you have any problems. Where can you learn more? Go to http://www.gray.com/  Enter M421 in the search box to learn more about \"Pelvic Exam: Care Instructions. \"  Current as of: November 8, 2019               Content Version: 12.5  © 7928-4973 [employer], Incorporated. Care instructions adapted under license by [employer] (which disclaims liability or warranty for this information). If you have questions about a medical condition or this instruction, always ask your healthcare professional. [address] any warranty or liability for your use of this information.

## 2020-08-27 NOTE — PROGRESS NOTES
Vaginitis  CC: Vaginal discharge    HPI: Ms. Misa Win is a 37 y.o. No obstetric history on file. female presenting for evaluation of vaginal discharge. She has no additional complaints. She has not had previous treatment for this problem. Onset: 4 to 5 days  Location: vaginal  Quality: thick   Severity: bothersome  Timing: above  Context: has tried the one day monistat last night. Associated symptoms: itching  She uses condoms or barrier contraception:never    OB History    No obstetric history on file. Past Medical History:   Diagnosis Date    Gastritis, Helicobacter pylori     Hypertension     Obesity        Past Surgical History:   Procedure Laterality Date    HX TUBAL LIGATION      LAP,TUBAL CAUTERY         Family History   Problem Relation Age of Onset    Hypertension Mother     Hypertension Sister        Social History     Socioeconomic History    Marital status: SINGLE     Spouse name: Not on file    Number of children: Not on file    Years of education: Not on file    Highest education level: Not on file   Occupational History    Not on file   Social Needs    Financial resource strain: Not on file    Food insecurity     Worry: Not on file     Inability: Not on file    Transportation needs     Medical: Not on file     Non-medical: Not on file   Tobacco Use    Smoking status: Former Smoker     Packs/day: 0.25     Last attempt to quit: 3/16/2018     Years since quittin.4    Smokeless tobacco: Never Used   Substance and Sexual Activity    Alcohol use: Yes     Comment: occ    Drug use: No    Sexual activity: Yes     Partners: Male     Birth control/protection: Surgical     Comment: Tubal ligation.    Lifestyle    Physical activity     Days per week: Not on file     Minutes per session: Not on file    Stress: Not on file   Relationships    Social connections     Talks on phone: Not on file     Gets together: Not on file     Attends [employer]ism service: Not on file Active member of club or organization: Not on file     Attends meetings of clubs or organizations: Not on file     Relationship status: Not on file    Intimate partner violence     Fear of current or ex partner: Not on file     Emotionally abused: Not on file     Physically abused: Not on file     Forced sexual activity: Not on file   Other Topics Concern    Not on file   Social History Narrative    Not on file       Current Outpatient Medications   Medication Sig Dispense Refill    amLODIPine (NORVASC) 10 mg tablet Take 1 tablet by mouth once daily 90 Tab 0    lidocaine (LIDODERM) 5 % APPLY ONE PATCH TOPICALLY TO CLEAN DRY SKIN. LEAVE ON FOR 12 HOURS THEN REMOVE. MUST WAIT AT LEAST 12 HOURS BEFORE APPLYING PATCH(ES) AGAIN.      IBUPROFEN  mg.  hydroCHLOROthiazide (HYDRODIURIL) 25 mg tablet TAKE 1 TABLET BY MOUTH ONCE DAILY (APPOINTMENT REQUIRED FOR FUTURE REFILLS) 90 Tab 1    loperamide (ANTI-DIARRHEAL, LOPERAMIDE,) 2 mg capsule Take 1 Cap by mouth two (2) times daily as needed for Diarrhea. 20 Cap 3    traZODone (DESYREL) 50 mg tablet Take 1 Tab by mouth nightly. 90 Tab 1    predniSONE (STERAPRED DS) 10 mg dose pack Take 1 Tab by mouth See Admin Instructions. See administration instruction per 10mg dose pack 21 Tab 0    acetaminophen (TYLENOL) 325 mg tablet Take 2 Tabs by mouth every four (4) hours as needed for Pain.  90 Tab 0       No Known Allergies    Review of Systems - History obtained from the patient  Constitutional: negative for weight loss, fever, night sweats  HEENT: negative for hearing loss, earache, congestion, snoring, sorethroat  CV: negative for chest pain, palpitations, edema  Resp: negative for cough, shortness of breath, wheezing  GI: negative for change in bowel habits, abdominal pain, black or bloody stools  : negative for frequency, dysuria, hematuria, + vaginal discharge as HPI  MSK: negative for back pain, joint pain, muscle pain  Skin :negative for itching, rash, hives  Neuro: negative for dizziness, headache, confusion, weakness  Psych: negative for anxiety, depression, change in mood  Heme/lymph: negative for bleeding, bruising, pallor    Physical Exam    Visit Vitals  /74 (BP 1 Location: Left arm, BP Patient Position: Sitting)   Ht 5' 11\" (1.803 m)   Wt 283 lb (128.4 kg)   LMP 08/07/2020 (Exact Date)   BMI 39.47 kg/m²       HENT  · Head and Face: appears normal    Neck  · Inspection/Palpation: normal appearance, no masses or tenderness  · Lymph Nodes: no lymphadenopathy present  · Thyroid: gland size normal, nontender, no nodules or masses present on palpation    Chest  · Respiratory Effort: non-labored breathing  · Auscultation: Clear to auscultation bilaterlly, normal breath sounds    Cardiovascular  · Heart:  · Auscultation: regular rate and rhythm without murmur  · Extremities: no peripheral edema    Gastrointestinal  · Abdominal Examination: abdomen non-tender to palpation, normal bowel sounds, no masses present  · Liver and spleen: no hepatomegaly present, spleen not palpable  · Hernias: no hernias identified    Genitourinary  · External Genitalia: normal appearance for age, no discharge present, no tenderness present, no inflammatory lesions present, no masses present, no atrophy present  · Vagina: normal vaginal vault without central or paravaginal defects, no inflammatory lesions present, no masses present, thick discharge present  · Bladder: non-tender to palpation  · Urethra: appears normal  · Cervix: normal, no cervicitis, no CMT  · Uterus: normal size, shape and consistency, mobile  · Adnexa: no adnexal tenderness present, no adnexal masses present  · Perineum: perineum within normal limits, no evidence of trauma, no rashes or skin lesions present    Skin  · General Inspection: no rash, no lesions identified    Neurologic/Psychiatric  · Mental Status:  · Orientation: grossly oriented to person, place and time  · Mood and Affect: mood normal, affect appropriate    Results for orders placed or performed in visit on 08/10/20   NOVEL CORONAVIRUS (COVID-19)   Result Value Ref Range    SARS-CoV-2, FRANCISCO JAVIER Not Detected      Assessment/Plan:  Vaginitis     Nuswab sent   Rx for diflucan sent. Counseled on safe sexual practices (barrier contraception). Reviewed vulvar/vaginal hygiene and irritant avoidance. RTC: for annual , or sooner prn for problems or concerns. Handouts and instructions provided.     Cristhian Guerrero  8/27/2020  3:21 PM    Signed By: Joyce Brewer MD     August 27, 2020

## 2020-08-31 LAB
A VAGINAE DNA VAG QL NAA+PROBE: ABNORMAL SCORE
BVAB2 DNA VAG QL NAA+PROBE: ABNORMAL SCORE
C ALBICANS DNA VAG QL NAA+PROBE: NEGATIVE
C GLABRATA DNA VAG QL NAA+PROBE: NEGATIVE
C TRACH DNA VAG QL NAA+PROBE: NEGATIVE
MEGA1 DNA VAG QL NAA+PROBE: ABNORMAL SCORE
N GONORRHOEA DNA VAG QL NAA+PROBE: NEGATIVE
T VAGINALIS DNA VAG QL NAA+PROBE: NEGATIVE

## 2020-08-31 RX ORDER — METRONIDAZOLE 500 MG/1
500 TABLET ORAL 2 TIMES DAILY
Qty: 14 TAB | Refills: 0 | Status: SHIPPED | OUTPATIENT
Start: 2020-08-31 | End: 2020-09-07

## 2020-09-01 RX ORDER — FLUCONAZOLE 150 MG/1
150 TABLET ORAL DAILY
Qty: 1 TAB | Refills: 0 | Status: SHIPPED | OUTPATIENT
Start: 2020-09-01 | End: 2020-09-02

## 2020-09-01 NOTE — PROGRESS NOTES
Per Dr. Andrew Starkey- called and spoke with patient. Informed her of lab results. Patient verbalized understanding and had no questions at this time. Patient is requesting medication for a yeast infection- she states she always gets one after the BV medication.

## 2020-10-16 ENCOUNTER — VIRTUAL VISIT (OUTPATIENT)
Dept: INTERNAL MEDICINE CLINIC | Age: 40
End: 2020-10-16
Payer: MEDICAID

## 2020-10-16 DIAGNOSIS — I10 ESSENTIAL HYPERTENSION: ICD-10-CM

## 2020-10-16 DIAGNOSIS — M25.561 CHRONIC PAIN OF BOTH KNEES: Primary | ICD-10-CM

## 2020-10-16 DIAGNOSIS — M25.562 CHRONIC PAIN OF BOTH KNEES: Primary | ICD-10-CM

## 2020-10-16 DIAGNOSIS — E66.9 OBESITY (BMI 35.0-39.9 WITHOUT COMORBIDITY): ICD-10-CM

## 2020-10-16 DIAGNOSIS — G89.29 CHRONIC PAIN OF BOTH KNEES: Primary | ICD-10-CM

## 2020-10-16 DIAGNOSIS — G47.00 INSOMNIA, UNSPECIFIED TYPE: ICD-10-CM

## 2020-10-16 PROCEDURE — 99214 OFFICE O/P EST MOD 30 MIN: CPT | Performed by: INTERNAL MEDICINE

## 2020-10-16 RX ORDER — DICLOFENAC SODIUM 75 MG/1
75 TABLET, DELAYED RELEASE ORAL 2 TIMES DAILY
Qty: 30 TAB | Refills: 0 | Status: SHIPPED | OUTPATIENT
Start: 2020-10-16 | End: 2020-10-31

## 2020-10-16 NOTE — PROGRESS NOTES
Sharif Burdick is a 37 y.o. female who was seen by synchronous (real-time) audio-video technology on 10/16/2020 for Knee Pain; Obesity; and Hypertension        Assessment & Plan:   Diagnoses and all orders for this visit:    1. Chronic pain of both knees    2. Obesity (BMI 35.0-39.9 without comorbidity)    3. Essential hypertension    4. Insomnia, unspecified type    Other orders  -     diclofenac EC (VOLTAREN) 75 mg EC tablet; Take 1 Tab by mouth two (2) times a day for 15 days. I spent at least 25 minutes on this visit with this established patient. Subjective:     Pt. is seen virtually for f/u. Has a few chronic medical issues as documented. Reports continued pain in both knees. Worse with weightbearing and certain motions. X-ray over a year ago did not show any acute issues or significant DJD. She is very obese with BMI close to 40. OTC meds help some. Denies any swelling or redness. Denies any trauma or falls. BP has been stable on medications. Taking precautions for Covid 19. Denies any related signs or symptoms including fever, cough, SOB or CP. PMH/PSH/Allergies/Social History/medication list and most recent studies reviewed with patient. Tobacco use: No  Alcohol use: Social    Reports compliance with medications and diet. Trying to be active physically but not losing much weight. Reports no other new c/o.     Plan:  Possible etiology of bilateral knee pain discussed with patient  Voltaren 75 mg twice daily x2 weeks  May need PT and cortisone shot if no better  Continue other medications  Advised patient to lose weight by watching diet (decreasing sugars/carbs/fat, increasing fruits/vegetables), exercising at least 30 minutes daily, getting 7-8 hours of sleep daily, drinking plenty of water, and decreasing stress  COVID-19 precautions discussed with pt  Advised patient to get flu shot  Follow-up as scheduled      Prior to Admission medications    Medication Sig Start Date End Date Taking? Authorizing Provider   diclofenac EC (VOLTAREN) 75 mg EC tablet Take 1 Tab by mouth two (2) times a day for 15 days. 10/16/20 10/31/20 Yes Sal Parikh,    amLODIPine (NORVASC) 10 mg tablet Take 1 tablet by mouth once daily 7/20/20  Yes Deyanira Boyce NP   lidocaine (LIDODERM) 5 % APPLY ONE PATCH TOPICALLY TO CLEAN DRY SKIN. LEAVE ON FOR 12 HOURS THEN REMOVE. MUST WAIT AT LEAST 12 HOURS BEFORE APPLYING PATCH(ES) AGAIN. 4/1/20  Yes Provider, Historical   hydroCHLOROthiazide (HYDRODIURIL) 25 mg tablet TAKE 1 TABLET BY MOUTH ONCE DAILY (APPOINTMENT REQUIRED FOR FUTURE REFILLS) 5/13/20  Yes Lupe Boyce NP   traZODone (DESYREL) 50 mg tablet Take 1 Tab by mouth nightly. 1/14/20  Yes Heather Parikh,    IBUPROFEN  mg. 3/24/19 10/16/20  Provider, Historical   loperamide (ANTI-DIARRHEAL, LOPERAMIDE,) 2 mg capsule Take 1 Cap by mouth two (2) times daily as needed for Diarrhea. 1/29/20   Teo Zeng MD     Patient Active Problem List    Diagnosis Date Noted    Chronic pain of both knees 10/16/2020    Severe obesity ([address] 06/27/2019    Lipoma of neck 90/82/8001    Helicobacter pylori gastritis 05/69/9245    Helicobacter pylori antibody positive 05/21/2018    Obesity (BMI 35.0-39.9 without comorbidity) 05/15/2018    Essential hypertension 05/15/2018    Gastritis without bleeding 05/15/2018    Insomnia 05/15/2018     Current Outpatient Medications   Medication Sig Dispense Refill    diclofenac EC (VOLTAREN) 75 mg EC tablet Take 1 Tab by mouth two (2) times a day for 15 days. 30 Tab 0    amLODIPine (NORVASC) 10 mg tablet Take 1 tablet by mouth once daily 90 Tab 0    lidocaine (LIDODERM) 5 % APPLY ONE PATCH TOPICALLY TO CLEAN DRY SKIN. LEAVE ON FOR 12 HOURS THEN REMOVE. MUST WAIT AT LEAST 12 HOURS BEFORE APPLYING PATCH(ES) AGAIN.       hydroCHLOROthiazide (HYDRODIURIL) 25 mg tablet TAKE 1 TABLET BY MOUTH ONCE DAILY (APPOINTMENT REQUIRED FOR FUTURE REFILLS) 90 Tab 1    traZODone (DESYREL) 50 mg tablet Take 1 Tab by mouth nightly. 90 Tab 1    loperamide (ANTI-DIARRHEAL, LOPERAMIDE,) 2 mg capsule Take 1 Cap by mouth two (2) times daily as needed for Diarrhea. 20 Cap 3     No Known Allergies  Past Medical History:   Diagnosis Date    Chronic pain of both knees 10/16/2020    Gastritis, Helicobacter pylori     Hypertension     Obesity      Past Surgical History:   Procedure Laterality Date    HX TUBAL LIGATION      LAP,TUBAL CAUTERY       Social History     Tobacco Use    Smoking status: Former Smoker     Packs/day: 0.25     Last attempt to quit: 3/16/2018     Years since quittin.5    Smokeless tobacco: Never Used   Substance Use Topics    Alcohol use: Yes     Comment: occ       ROS    Objective:   No flowsheet data found.      [INSTRUCTIONS:  \"[x]\" Indicates a positive item  \"[]\" Indicates a negative item  -- DELETE ALL ITEMS NOT EXAMINED]    Constitutional: [x] Appears well-developed and well-nourished [x] No apparent distress      [] Abnormal -     Mental status: [x] Alert and awake  [x] Oriented to person/place/time [x] Able to follow commands    [] Abnormal -     Eyes:   EOM    [x]  Normal    [] Abnormal -   Sclera  [x]  Normal    [] Abnormal -          Discharge [x]  None visible   [] Abnormal -     HENT: [x] Normocephalic, atraumatic  [] Abnormal -   [x] Mouth/Throat: Mucous membranes are moist    External Ears [x] Normal  [] Abnormal -    Neck: [x] No visualized mass [] Abnormal -     Pulmonary/Chest: [x] Respiratory effort normal   [x] No visualized signs of difficulty breathing or respiratory distress        [] Abnormal -      Musculoskeletal:   [x] Normal gait with no signs of ataxia         [x] Normal range of motion of neck        [] Abnormal -     Neurological:        [x] No Facial Asymmetry (Cranial nerve 7 motor function) (limited exam due to video visit)          [x] No gaze palsy        [] Abnormal -          Skin:        [x] No significant exanthematous lesions or discoloration noted on facial skin         [] Abnormal -            Psychiatric:       [x] Normal Affect [] Abnormal -        [x] No Hallucinations    Other pertinent observable physical exam findings:-        We discussed the expected course, resolution and complications of the diagnosis(es) in detail. Medication risks, benefits, costs, interactions, and alternatives were discussed as indicated. I advised her to contact the office if her condition worsens, changes or fails to improve as anticipated. She expressed understanding with the diagnosis(es) and plan. Chaitanya Lyle, who was evaluated through a patient-initiated, synchronous (real-time) audio-video encounter, and/or her healthcare decision maker, is aware that it is a billable service, with coverage as determined by her insurance carrier. She provided verbal consent to proceed: Yes, and patient identification was verified. It was conducted pursuant to the emergency declaration under the [address] [place] [address] authority and the [employer] Act. A caregiver was present when appropriate. Ability to conduct physical exam was limited. I was at home. The patient was at home.       Silver Alejandro DO

## 2020-10-16 NOTE — PROGRESS NOTES
Health Maintenance Due   Topic Date Due    DTaP/Tdap/Td series (1 - Tdap) 12/06/1999    Flu Vaccine (1) 09/01/2020       Chief Complaint   Patient presents with    Knee Pain    Obesity    Hypertension       1. Have you been to the ER, urgent care clinic since your last visit? Hospitalized since your last visit? No    2. Have you seen or consulted any other health care providers outside of the [address] [place] [address] since your last visit? Include any pap smears or colon screening. No    3) Do you have an Advance Directive on file? no    4) Are you interested in receiving information on Advance Directives? NO      Patient is accompanied by self I have received verbal consent from Casa Hernandez to discuss any/all medical information while they are present in the room.

## 2020-10-23 RX ORDER — AMLODIPINE BESYLATE 10 MG/1
TABLET ORAL
Qty: 90 TAB | Refills: 1 | Status: SHIPPED | OUTPATIENT
Start: 2020-10-23 | End: 2021-04-30

## 2020-10-28 ENCOUNTER — PATIENT MESSAGE (OUTPATIENT)
Dept: INTERNAL MEDICINE CLINIC | Age: 40
End: 2020-10-28

## 2020-10-28 RX ORDER — LOPERAMIDE HYDROCHLORIDE 2 MG/1
2 CAPSULE ORAL
Qty: 20 CAP | Refills: 3 | Status: SHIPPED | OUTPATIENT
Start: 2020-10-28 | End: 2021-05-04 | Stop reason: SDUPTHER

## 2020-10-28 NOTE — TELEPHONE ENCOUNTER
----- Message from Marianna Kinsey sent at 10/28/2020 10:00 AM EDT -----  Regarding: Prescription Question  Contact: [phone]  Please approve my refill for my medication loperamide thanks

## 2020-11-30 DIAGNOSIS — I10 ESSENTIAL HYPERTENSION: ICD-10-CM

## 2020-12-01 RX ORDER — HYDROCHLOROTHIAZIDE 25 MG/1
TABLET ORAL
Qty: 90 TAB | Refills: 1 | Status: SHIPPED | OUTPATIENT
Start: 2020-12-01 | End: 2021-07-13 | Stop reason: SDUPTHER

## 2021-01-17 LAB — SARS-COV-2, NAA: NOT DETECTED

## 2021-03-11 ENCOUNTER — OFFICE VISIT (OUTPATIENT)
Dept: OBGYN CLINIC | Age: 41
End: 2021-03-11
Payer: MEDICAID

## 2021-03-11 VITALS
BODY MASS INDEX: 39.37 KG/M2 | SYSTOLIC BLOOD PRESSURE: 122 MMHG | HEIGHT: 71 IN | WEIGHT: 281.25 LBS | DIASTOLIC BLOOD PRESSURE: 78 MMHG

## 2021-03-11 DIAGNOSIS — N76.0 ACUTE VAGINITIS: ICD-10-CM

## 2021-03-11 DIAGNOSIS — Z30.09 FAMILY PLANNING: Primary | ICD-10-CM

## 2021-03-11 DIAGNOSIS — N97.9 INFERTILITY, FEMALE: ICD-10-CM

## 2021-03-11 PROCEDURE — 99213 OFFICE O/P EST LOW 20 MIN: CPT | Performed by: OBSTETRICS & GYNECOLOGY

## 2021-03-11 RX ORDER — METRONIDAZOLE 500 MG/1
500 TABLET ORAL 2 TIMES DAILY
Qty: 14 TAB | Refills: 0 | Status: SHIPPED | OUTPATIENT
Start: 2021-03-11 | End: 2021-03-18

## 2021-03-11 NOTE — PATIENT INSTRUCTIONS
Infertility: Care Instructions  Your Care Instructions     Infertility means that you haven't been able to get pregnant after trying for at least 1 year. It doesn't mean you'll never get pregnant. A woman's chances of getting pregnant are higher when she's younger. A woman is most able to get pregnant (fertile) in her late 25s. Then, in her mid-30s, she becomes less fertile. This is because her eggs get older. Trouble getting pregnant can be caused by a problem with the reproductive organs of a woman, a man, or both. Talk with your doctor about testing and treatment. If you have tests, you will likely start with a hormone test. This is a test for both of you. And then the man will probably have a semen test.  There is a wide range of treatment options. They include medicines, surgery, insemination, and in vitro fertilization (IVF). Follow-up care is a key part of your treatment and safety. Be sure to make and go to all appointments, and call your doctor if you are having problems. It's also a good idea to know your test results and keep a list of the medicines you take. How can you care for yourself at home? For women  · Take a multivitamin with folic acid. This helps to prevent birth defects if you do become pregnant. · Get regular exercise. But do not overdo it. Really hard and long exercise can cause you to release an egg less often. · Eat healthy foods. And drink lots of water. · Stay at a healthy weight. This will increase your chances of getting pregnant. Women who weigh too much or too little can be less fertile. · Talk to your doctor about all medicines you are taking or may take. This includes over-the-counter and prescribed medicines and herbal remedies. Some medicines interfere with pregnancy. · Write down when your period starts and stops for a few months. Bring that information to your doctor.  He or she can help you figure out when you ovulate and are most likely to get pregnant if you have sex. Or you may prefer to use a home ovulation test.  For men  · Avoid hot tubs and saunas. · If you get sick and have a fever, try to control your fever. A high fever may reduce your sperm count for months. · If you exercise very hard most days of the week, reduce how much exercise you do. Hard, long exercise may lower your sperm count. · Eat a healthy diet and stay at a healthy weight. Limit alcohol to 2 drinks a day. For both men and women  · If the woman knows when she will ovulate, try to have sex once a day for the 4 days before ovulation and on the day of ovulation. If the man has a low sperm count, have sex every other day. · If the woman does not know when she will ovulate, have sex 2 or 3 times each week. · Don't use lubricants during sex. They may affect how well sperm can travel to meet the woman's egg. · Avoid smoking and illegal drugs. When should you call for help? Watch closely for changes in your health, and be sure to contact your doctor if you have any problems. Where can you learn more? Go to http://www.gray.com/  Enter C591 in the search box to learn more about \"Infertility: Care Instructions. \"  Current as of: February 11, 2020               Content Version: 12.6  © 3118-7431 [employer]. Care instructions adapted under license by [employer] (which disclaims liability or warranty for this information). If you have questions about a medical condition or this instruction, always ask your healthcare professional. [place] [address] any warranty or liability for your use of this information.

## 2021-03-11 NOTE — PROGRESS NOTES
Vaginitis  CC: Vaginal discharge    HPI: Ms. Terri Tolliver is a 41 y.o.  female presenting for evaluation of vaginal discharge. She has not had previous treatment for this problem. Discharge after hot tub. Also desires tubal reversal (BTl at age 22). OB History        2    Para        Term                AB   0    Living   2       SAB   0    TAB   0    Ectopic        Molar        Multiple        Live Births                    Past Medical History:   Diagnosis Date    Chronic pain of both knees 10/16/2020    Gastritis, Helicobacter pylori     Hypertension     Obesity        Past Surgical History:   Procedure Laterality Date    HX TUBAL LIGATION      DC LAP,TUBAL CAUTERY         Family History   Problem Relation Age of Onset    Hypertension Mother     Hypertension Sister        Social History     Socioeconomic History    Marital status: SINGLE     Spouse name: Not on file    Number of children: Not on file    Years of education: Not on file    Highest education level: Not on file   Occupational History    Not on file   Social Needs    Financial resource strain: Not on file    Food insecurity     Worry: Not on file     Inability: Not on file    Transportation needs     Medical: Not on file     Non-medical: Not on file   Tobacco Use    Smoking status: Former Smoker     Packs/day: 0.25     Quit date: 3/16/2018     Years since quittin.9    Smokeless tobacco: Never Used   Substance and Sexual Activity    Alcohol use: Yes     Comment: occ    Drug use: No    Sexual activity: Yes     Partners: Male     Birth control/protection: Surgical     Comment: Tubal ligation.    Lifestyle    Physical activity     Days per week: Not on file     Minutes per session: Not on file    Stress: Not on file   Relationships    Social connections     Talks on phone: Not on file     Gets together: Not on file     Attends [employer] service: Not on file     Active member of club or organization: Not on file     Attends meetings of clubs or organizations: Not on file     Relationship status: Not on file    Intimate partner violence     Fear of current or ex partner: Not on file     Emotionally abused: Not on file     Physically abused: Not on file     Forced sexual activity: Not on file   Other Topics Concern    Not on file   Social History Narrative    Not on file       Current Outpatient Medications   Medication Sig Dispense Refill    metroNIDAZOLE (FLAGYL) 500 mg tablet Take 1 Tab by mouth two (2) times a day for 7 days. 14 Tab 0    hydroCHLOROthiazide (HYDRODIURIL) 25 mg tablet TAKE 1 TABLET BY MOUTH ONCE DAILY 90 Tab 1    amLODIPine (NORVASC) 10 mg tablet Take 1 tablet by mouth once daily 90 Tab 1    lidocaine (LIDODERM) 5 % APPLY ONE PATCH TOPICALLY TO CLEAN DRY SKIN. LEAVE ON FOR 12 HOURS THEN REMOVE. MUST WAIT AT LEAST 12 HOURS BEFORE APPLYING PATCH(ES) AGAIN.  traZODone (DESYREL) 50 mg tablet Take 1 Tab by mouth nightly. 90 Tab 1    loperamide (Anti-Diarrheal, Loperamide,) 2 mg capsule Take 1 Cap by mouth two (2) times daily as needed for Diarrhea.  20 Cap 3       No Known Allergies    Physical Exam    Visit Vitals  /78 (BP 1 Location: Left arm, BP Patient Position: Sitting)   Ht 5' 11\" (1.803 m)   Wt 281 lb 4 oz (127.6 kg)   LMP 03/01/2021 (Exact Date)   BMI 39.23 kg/m²       HENT  · Head and Face: appears normal    Genitourinary  · External Genitalia: normal appearance for age, no discharge present, no tenderness present, no inflammatory lesions present, no masses present, no atrophy present  · Vagina: normal vaginal vault without central or paravaginal defects, no inflammatory lesions present, no masses present, thin discharge present  · Bladder: non-tender to palpation  · Urethra: appears normal  · Cervix: normal, no cervicitis, no CMT  · Perineum: perineum within normal limits, no evidence of trauma, no rashes or skin lesions present    Skin  · General Inspection: no rash, no lesions identified    Neurologic/Psychiatric  · Mental Status:  · Orientation: grossly oriented to person, place and time  · Mood and Affect: mood normal, affect appropriate    Results for orders placed or performed in visit on 08/27/20   NUSWAB VAGINITIS PLUS   Result Value Ref Range    Atopobium vaginae Moderate - 1 Score    BVAB 2 High - 2 (A) Score    Megasphaera 1 High - 2 (A) Score    C. albicans, FRANCISCO JAVIER Negative Negative    C. glabrata, FRANCISCO JAVIER Negative Negative    T. vaginalis, FRANCISCO JAVIER Negative Negative    C. trachomatis, FRANCISCO JAVIER Negative Negative    N. gonorrhoeae, FRANCISCO JAVIER Negative Negative         Assessment/Plan:  Vaginitis     Nuswab sent  Rx for flagyl sent. Dr. Davion Hermosillo info given     Counseled on safe sexual practices (barrier contraception). Reviewed vulvar/vaginal hygiene and irritant avoidance. RTC: for annual , or sooner prn for problems or concerns. Handouts and instructions provided.     Casey Lopez MD  3/11/2021  2:13 PM

## 2021-03-22 ENCOUNTER — PATIENT MESSAGE (OUTPATIENT)
Dept: OBGYN CLINIC | Age: 41
End: 2021-03-22

## 2021-03-22 DIAGNOSIS — B37.9 YEAST INFECTION: Primary | ICD-10-CM

## 2021-03-22 RX ORDER — FLUCONAZOLE 150 MG/1
150 TABLET ORAL DAILY
Qty: 1 TAB | Refills: 0 | Status: SHIPPED | OUTPATIENT
Start: 2021-03-22 | End: 2021-03-23

## 2021-03-22 NOTE — TELEPHONE ENCOUNTER
From: Cindy Espinoza  To: Chen Fitzpatrick MD  Sent: 3/22/2021 2:05 PM EDT  Subject: Prescription Question    Good afternoon can you please send in a prescription for yeast infection as I've always had that after completing the flagyl medication. And I can see that it is there.  Thanks

## 2021-03-30 ENCOUNTER — OFFICE VISIT (OUTPATIENT)
Dept: OBGYN CLINIC | Age: 41
End: 2021-03-30
Payer: MEDICAID

## 2021-03-30 VITALS
WEIGHT: 281 LBS | DIASTOLIC BLOOD PRESSURE: 80 MMHG | HEIGHT: 71 IN | SYSTOLIC BLOOD PRESSURE: 124 MMHG | BODY MASS INDEX: 39.34 KG/M2

## 2021-03-30 DIAGNOSIS — E66.9 CLASS 2 OBESITY WITHOUT SERIOUS COMORBIDITY WITH BODY MASS INDEX (BMI) OF 39.0 TO 39.9 IN ADULT, UNSPECIFIED OBESITY TYPE: ICD-10-CM

## 2021-03-30 DIAGNOSIS — Z01.419 ENCOUNTER FOR GYNECOLOGICAL EXAMINATION WITHOUT ABNORMAL FINDING: Primary | ICD-10-CM

## 2021-03-30 DIAGNOSIS — N63.21 BREAST LUMP ON LEFT SIDE AT 1 O'CLOCK POSITION: ICD-10-CM

## 2021-03-30 PROCEDURE — 99396 PREV VISIT EST AGE 40-64: CPT | Performed by: OBSTETRICS & GYNECOLOGY

## 2021-03-30 NOTE — PATIENT INSTRUCTIONS
Pelvic Exam: Care Instructions  Your Care Instructions     When your doctor examines all of your pelvic organs, it's called a pelvic exam. Two good reasons to have this kind of exam are to check for sexually transmitted infections (STIs) and to get a Pap test. A Pap test is also called a Pap smear. It checks for early changes that can lead to cancer of the cervix. Sometimes a pelvic exam is part of a regular checkup. Your doctor may ask you to avoid vaginal sex, tampons, vaginal medicines, vaginal sprays or powders, and douching for 1 to 2 days before the test.  Other times, women have this kind of exam at any time of the month. This is because they have pelvic pain, bleeding, or discharge. Or they may have another pelvic problem. Before your exam, it's important to share some information with your doctor. For example, if you are a survivor of rape or sexual abuse, you can talk about any concerns you may have. Your doctor will also want to know if you are pregnant or use birth control. And he or she will want to hear about any problems, surgeries, or procedures you have had in your pelvic area. You will also need to tell your doctor when your last period was. Follow-up care is a key part of your treatment and safety. Be sure to make and go to all appointments, and call your doctor if you are having problems. It's also a good idea to know your test results and keep a list of the medicines you take. How is a pelvic exam done? · During a pelvic exam, you will:  ? Take off your clothes below the waist. You will get a paper or cloth cover to put over the lower half of your body. If this is regular checkup, you may undress completely and put on a gown. ? Lie on your back on an exam table. Your feet will be raised above you. Stirrups will support your feet. · The doctor will:  ? Ask you to relax your knees. Your knees need to lean out, toward the walls. ?  Check the opening of your vagina for sores or swelling. ? Gently put a tool called a speculum into your vagina. It opens the vagina a little bit. You will feel some pressure. But if you are relaxed, it will not hurt. It lets your doctor see inside the vagina. ? Use a small brush, spatula, or swab to get a sample of cells, if you are having a Pap test or culture. The doctor then removes the speculum. ? Put on gloves and put one or two fingers of one hand into your vagina. The other hand goes on your lower belly. This lets your doctor feel your pelvic organs. You will probably feel some pressure. Try to stay relaxed. ? Put one gloved finger into your rectum and one into your vagina, if needed. This can also help check your pelvic organs. This exam takes about 10 minutes. At the end, you will get a washcloth or tissue to clean your vaginal area. You can then get dressed. Why is a pelvic exam done? A pelvic exam may be done:  · As part of a woman's regular physical checkup. The exam may include a Pap test.  · To check for vaginal infection. · To check for sexually transmitted infections, such as chlamydia or herpes. · To help find the cause of abnormal uterine bleeding. · To look for problems like uterine fibroids, ovarian cysts, or uterine prolapse. · To find the cause of pelvic or belly pain. · Before inserting an intrauterine device (IUD) for birth control. · To collect evidence if you've been sexually assaulted. What are the risks of a pelvic exam?  There is a small chance that the doctor will find something on a pelvic exam that would not have caused a problem. This is called overdiagnosis. It could lead to tests or treatment you don't need. When should you call for help? Watch closely for changes in your health, and be sure to contact your doctor if you have any problems. Where can you learn more? Go to http://www.gray.com/  Enter M421 in the search box to learn more about \"Pelvic Exam: Care Instructions. \"  Current as of: November 8, 2019               Content Version: 12.6  © 6455-7482 [employer]. Care instructions adapted under license by [employer] (which disclaims liability or warranty for this information). If you have questions about a medical condition or this instruction, always ask your healthcare professional. [address] any warranty or liability for your use of this information.

## 2021-03-30 NOTE — PROGRESS NOTES
Annual exam    Josh Preston is a 41 y.o. presenting for annual exam.   Her main concerns today include weight loss    Ob/Gyn Hx:    Patient's last menstrual period was 2021 (exact date). Menses- regular monthly cycles? yes, Bothersome? no  Contraception-  STI- denies  SA-yes    Health maintenance:  Pap-2018  Mammo-needs  Colonoscopy- wants    Past Medical History:   Diagnosis Date    Chronic pain of both knees 10/16/2020    Gastritis, Helicobacter pylori     Hypertension     Obesity        Past Surgical History:   Procedure Laterality Date    HX TUBAL LIGATION      IR CHOLECYSTOSTOMY PERCUTANEOUS      TX LAP,TUBAL CAUTERY         Family History   Problem Relation Age of Onset    Hypertension Mother     Hypertension Sister        Social History     Socioeconomic History    Marital status: SINGLE     Spouse name: Not on file    Number of children: Not on file    Years of education: Not on file    Highest education level: Not on file   Occupational History    Not on file   Social Needs    Financial resource strain: Not on file    Food insecurity     Worry: Not on file     Inability: Not on file    Transportation needs     Medical: Not on file     Non-medical: Not on file   Tobacco Use    Smoking status: Former Smoker     Packs/day: 0.25     Quit date: 3/16/2018     Years since quitting: 3.0    Smokeless tobacco: Never Used   Substance and Sexual Activity    Alcohol use: Yes     Comment: occ    Drug use: No    Sexual activity: Yes     Partners: Male     Birth control/protection: Surgical     Comment: Tubal ligation.    Lifestyle    Physical activity     Days per week: Not on file     Minutes per session: Not on file    Stress: Not on file   Relationships    Social connections     Talks on phone: Not on file     Gets together: Not on file     Attends [employer] service: Not on file     Active member of club or organization: Not on file     Attends meetings of clubs or organizations: Not on file     Relationship status: Not on file    Intimate partner violence     Fear of current or ex partner: Not on file     Emotionally abused: Not on file     Physically abused: Not on file     Forced sexual activity: Not on file   Other Topics Concern    Not on file   Social History Narrative    Not on file       Current Outpatient Medications   Medication Sig Dispense Refill    hydroCHLOROthiazide (HYDRODIURIL) 25 mg tablet TAKE 1 TABLET BY MOUTH ONCE DAILY 90 Tab 1    loperamide (Anti-Diarrheal, Loperamide,) 2 mg capsule Take 1 Cap by mouth two (2) times daily as needed for Diarrhea. 20 Cap 3    amLODIPine (NORVASC) 10 mg tablet Take 1 tablet by mouth once daily 90 Tab 1    lidocaine (LIDODERM) 5 % APPLY ONE PATCH TOPICALLY TO CLEAN DRY SKIN. LEAVE ON FOR 12 HOURS THEN REMOVE. MUST WAIT AT LEAST 12 HOURS BEFORE APPLYING PATCH(ES) AGAIN.  traZODone (DESYREL) 50 mg tablet Take 1 Tab by mouth nightly.  90 Tab 1       No Known Allergies      Physical Exam    Visit Vitals  /80 (BP 1 Location: Left upper arm, BP Patient Position: Sitting)   Ht 5' 11\" (1.803 m)   Wt 281 lb (127.5 kg)   LMP 03/24/2021 (Exact Date)   BMI 39.19 kg/m²       Constitutional  · Appearance: well-nourished, well developed, alert, in no acute distress    HENT  · Head and Face: appears normal    Neck  · Inspection/Palpation: normal appearance, no masses or tenderness  · Lymph Nodes: no lymphadenopathy present  · Thyroid: gland size normal, nontender, no nodules or masses present on palpation    Chest  · Respiratory Effort: non-labored breathing  · Auscultation: CTAB, normal breath sounds    Cardiovascular  · Heart:  · Auscultation: regular rate and rhythm without murmur  · Extremities: no peripheral edema    Breasts  · Inspection of Breasts: breasts symmetrical, no skin changes, no discharge present, nipple appearance normal, no skin retraction present  · Palpation of Breasts and Axillae: no masses present on palpation on right, on left breast at 1 o'clock 2cm mobile mass, no breast tenderness  · Axillary Lymph Nodes: no lymphadenopathy present    Gastrointestinal  · Abdominal Examination: abdomen non-tender to palpation, normal bowel sounds, no masses present  · Liver and spleen: no hepatomegaly present, spleen not palpable  · Hernias: no hernias identified    Genitourinary  · External Genitalia: normal appearance for age, no discharge present, no tenderness present, no inflammatory lesions present, no masses present, no atrophy present  · Vagina: normal vaginal vault without central or paravaginal defects, no discharge present, no inflammatory lesions present, no masses present  · Bladder: non-tender to palpation  · Urethra: appears normal  · Cervix: normal  · Perineum: perineum within normal limits, no evidence of trauma, no rashes or skin lesions present    Skin  · General Inspection: no rash, no lesions identified    Neurologic/Psychiatric  · Mental Status:  · Orientation: grossly oriented to person, place and time  · Mood and Affect: mood normal, affect appropriate      Assessment/Plan:  41 y.o. overall doing well.      Health Maintenance:  -counseled re: diet, exercise, healthy lifestyle  -pap/HPV UTD  -STI testing    DECLINED  -diagnostic mammo ordered  She will seek phentermine with PCP    RTC: 1 year for annual wellness assessment, or sooner prn for problems or concerns  -handouts and instructions provided    Eddie Magana  3/30/2021  10:47 AM     Signed By: Chen Fitzpatrick MD     March 30, 2021

## 2021-04-01 ENCOUNTER — HOSPITAL ENCOUNTER (OUTPATIENT)
Dept: MAMMOGRAPHY | Age: 41
Discharge: HOME | End: 2021-04-01
Attending: OBSTETRICS & GYNECOLOGY
Payer: MEDICAID

## 2021-04-01 DIAGNOSIS — N63.21 BREAST LUMP ON LEFT SIDE AT 1 O'CLOCK POSITION: ICD-10-CM

## 2021-04-01 PROCEDURE — 77066 DX MAMMO INCL CAD BI: CPT

## 2021-04-01 PROCEDURE — 76642 ULTRASOUND BREAST LIMITED: CPT

## 2021-04-30 RX ORDER — AMLODIPINE BESYLATE 10 MG/1
TABLET ORAL
Qty: 90 TAB | Refills: 0 | Status: SHIPPED | OUTPATIENT
Start: 2021-04-30 | End: 2021-07-01 | Stop reason: SDUPTHER

## 2021-05-04 ENCOUNTER — OFFICE VISIT (OUTPATIENT)
Dept: INTERNAL MEDICINE CLINIC | Age: 41
End: 2021-05-04
Payer: MEDICAID

## 2021-05-04 VITALS
HEART RATE: 81 BPM | WEIGHT: 261 LBS | HEIGHT: 71 IN | OXYGEN SATURATION: 98 % | SYSTOLIC BLOOD PRESSURE: 132 MMHG | TEMPERATURE: 98.2 F | DIASTOLIC BLOOD PRESSURE: 84 MMHG | BODY MASS INDEX: 36.54 KG/M2 | RESPIRATION RATE: 18 BRPM

## 2021-05-04 DIAGNOSIS — R15.9 INCONTINENCE OF FECES, UNSPECIFIED FECAL INCONTINENCE TYPE: ICD-10-CM

## 2021-05-04 DIAGNOSIS — E66.9 OBESITY (BMI 35.0-39.9 WITHOUT COMORBIDITY): ICD-10-CM

## 2021-05-04 DIAGNOSIS — R19.7 DIARRHEA, UNSPECIFIED TYPE: Primary | ICD-10-CM

## 2021-05-04 DIAGNOSIS — I10 ESSENTIAL HYPERTENSION: ICD-10-CM

## 2021-05-04 PROCEDURE — 99214 OFFICE O/P EST MOD 30 MIN: CPT | Performed by: INTERNAL MEDICINE

## 2021-05-04 RX ORDER — LOPERAMIDE HYDROCHLORIDE 2 MG/1
2 CAPSULE ORAL
Qty: 30 CAP | Refills: 0 | Status: SHIPPED | OUTPATIENT
Start: 2021-05-04 | End: 2022-06-04

## 2021-05-04 RX ORDER — MONTELUKAST SODIUM 4 MG/1
1 TABLET, CHEWABLE ORAL 2 TIMES DAILY
Qty: 30 TAB | Refills: 0 | Status: SHIPPED | OUTPATIENT
Start: 2021-05-04 | End: 2022-06-04

## 2021-05-04 NOTE — PROGRESS NOTES
Health Maintenance Due   Topic Date Due    Hepatitis C Screening  Never done    DTaP/Tdap/Td series (1 - Tdap) Never done    PAP AKA CERVICAL CYTOLOGY  04/17/2021       Chief Complaint   Patient presents with    Complete Physical    Diarrhea       1. Have you been to the ER, urgent care clinic since your last visit? Hospitalized since your last visit? Yes, [facility] , Diarrhea    2. Have you seen or consulted any other health care providers outside of the [address] [place] [address] since your last visit? Include any pap smears or colon screening. No    3) Do you have an Advance Directive on file? no    4) Are you interested in receiving information on Advance Directives? NO      Patient is accompanied by self I have received verbal consent from Camryn Govea to discuss any/all medical information while they are present in the room.

## 2021-05-05 NOTE — PROGRESS NOTES
HISTORY OF PRESENT ILLNESS  To Silveira is a 41 y.o. female. Patient comes in for follow-up. Has a few chronic medical issues as documented. Vital signs are stable. She is obese with BMI of 36. Reports having recent issues with loose stool and fecal incontinence. No melena or hematochezia. Denies any other GI or  symptoms. Has some chronic pain in her knees. OTC meds help some. Denies any falls. BP has been stable on medications. Takes trazodone for insomnia. Taking precautions for Covid 19. Denies any related signs or symptoms including fever, cough, SOB or CP. PMH/PSH/Allergies/Social History/medication list and most recent studies reviewed with patient. Tobacco use: No  Alcohol use: Social  Reports compliance with medications and diet. Trying to be active physically to lose weight. Reports no other new c/o. HPI    Review of Systems   Constitutional: Negative. HENT: Negative. Eyes: Negative. Respiratory: Negative for shortness of breath. Cardiovascular: Negative for chest pain and leg swelling. Gastrointestinal: Positive for diarrhea. Negative for abdominal pain, blood in stool, constipation, heartburn, melena, nausea and vomiting. Genitourinary: Negative for dysuria and frequency. Musculoskeletal: Negative for joint pain. Skin: Negative. Neurological: Negative for dizziness, sensory change, focal weakness and headaches. Endo/Heme/Allergies: Negative. Psychiatric/Behavioral: Negative for depression. The patient has insomnia. The patient is not nervous/anxious. All other systems reviewed and are negative. Physical Exam  Vitals signs and nursing note reviewed. Constitutional:       General: She is not in acute distress. Appearance: She is well-developed. Comments: Pleasant lady  Obese   HENT:      Head: Normocephalic and atraumatic. Mouth/Throat:      Mouth: Mucous membranes are moist.   Eyes:      General: No scleral icterus. Conjunctiva/sclera: Conjunctivae normal.   Neck:      Musculoskeletal: Normal range of motion and neck supple. Comments: Left neck/supraclavicular mass/lipoma  Cardiovascular:      Rate and Rhythm: Normal rate and regular rhythm. Heart sounds: Normal heart sounds. No murmur. Pulmonary:      Effort: Pulmonary effort is normal. No respiratory distress. Breath sounds: Normal breath sounds. No wheezing. Abdominal:      General: Bowel sounds are normal. There is no distension. Palpations: Abdomen is soft. Tenderness: There is no abdominal tenderness. There is no right CVA tenderness, left CVA tenderness or guarding. Comments: obese   Musculoskeletal: Normal range of motion. General: No swelling, tenderness or signs of injury. Skin:     General: Skin is warm and dry. Findings: No rash. Neurological:      Mental Status: She is alert and oriented to person, place, and time. Coordination: Coordination normal.   Psychiatric:         Behavior: Behavior normal.         ASSESSMENT and PLAN  Diagnoses and all orders for this visit:    1. Diarrhea, unspecified type  -     REFERRAL TO COLON AND RECTAL SURGERY    2. Incontinence of feces, unspecified fecal incontinence type  -     REFERRAL TO COLON AND RECTAL SURGERY    3. Essential hypertension    4. Obesity (BMI 35.0-39.9 without comorbidity)    Other orders  -     loperamide (Anti-Diarrheal, Loperamide,) 2 mg capsule; Take 1 Cap by mouth four (4) times daily as needed for Diarrhea. -     colestipoL (COLESTID) 1 gram tablet; Take 1 Tab by mouth two (2) times a day. Follow-up and Dispositions    · Return in about 6 months (around 11/4/2021).      lab results and schedule of future lab studies reviewed with patient  reviewed diet, exercise and weight control  reviewed medications and side effects in detail  Monitor BP at home with goal of 140/90 or less  May need referral to GI if diarrhea doesn't improve  COVID-19 precautions discussed with pt

## 2021-07-01 RX ORDER — AMLODIPINE BESYLATE 10 MG/1
TABLET ORAL
Qty: 90 TABLET | Refills: 0 | Status: SHIPPED | OUTPATIENT
Start: 2021-07-01 | End: 2021-07-13 | Stop reason: SDUPTHER

## 2021-07-13 DIAGNOSIS — I10 ESSENTIAL HYPERTENSION: Primary | ICD-10-CM

## 2021-07-13 DIAGNOSIS — I10 ESSENTIAL HYPERTENSION: ICD-10-CM

## 2021-07-13 RX ORDER — AMLODIPINE BESYLATE 10 MG/1
TABLET ORAL
Qty: 90 TABLET | Refills: 1 | Status: SHIPPED | OUTPATIENT
Start: 2021-07-13 | End: 2022-03-11 | Stop reason: SDUPTHER

## 2021-07-13 RX ORDER — HYDROCHLOROTHIAZIDE 25 MG/1
TABLET ORAL
Qty: 90 TABLET | Refills: 1 | Status: SHIPPED | OUTPATIENT
Start: 2021-07-13 | End: 2022-02-23 | Stop reason: SDUPTHER

## 2021-07-22 ENCOUNTER — OFFICE VISIT (OUTPATIENT)
Dept: OBGYN CLINIC | Age: 41
End: 2021-07-22
Payer: MEDICAID

## 2021-07-22 VITALS — DIASTOLIC BLOOD PRESSURE: 80 MMHG | BODY MASS INDEX: 37.8 KG/M2 | WEIGHT: 271 LBS | SYSTOLIC BLOOD PRESSURE: 118 MMHG

## 2021-07-22 DIAGNOSIS — N89.8 VAGINAL ODOR: ICD-10-CM

## 2021-07-22 DIAGNOSIS — R15.1 FECAL SMEARING: Primary | ICD-10-CM

## 2021-07-22 DIAGNOSIS — N89.8 VAGINAL DISCHARGE: ICD-10-CM

## 2021-07-22 PROCEDURE — 99213 OFFICE O/P EST LOW 20 MIN: CPT | Performed by: ADVANCED PRACTICE MIDWIFE

## 2021-07-22 RX ORDER — METRONIDAZOLE 7.5 MG/G
1 GEL VAGINAL
Qty: 25 G | Refills: 0 | Status: SHIPPED | OUTPATIENT
Start: 2021-07-22 | End: 2021-07-27

## 2021-07-22 NOTE — PATIENT INSTRUCTIONS
Exposure to Sexually Transmitted Infections: Care Instructions  Your Care Instructions  Sexually transmitted infections (STIs) are those diseases spread by sexual contact. There are at least 20 different STIs, including chlamydia, gonorrhea, syphilis, and human immunodeficiency virus (HIV), which causes AIDS. Bacteria-caused STIs can be treated and cured. STIs caused by viruses, such as HIV, can be treated but not cured. Some STIs can reduce a woman's chances of getting pregnant in the future. STIs are spread during sexual contact, such as vaginal intercourse and oral or anal sex. Follow-up care is a key part of your treatment and safety. Be sure to make and go to all appointments, and call your doctor if you are having problems. It's also a good idea to know your test results and keep a list of the medicines you take. How can you care for yourself at home? · Take medicines exactly as prescribed. · If your doctor prescribed antibiotics, take them as directed. Do not stop taking them just because you feel better. You need to take the full course of antibiotics. · Tell your sex partner (or partners) that they will need treatment. · If you are a woman, do not douche. Douching changes the normal balance of bacteria in the vagina. It may also spread an infection up into your reproductive organs. How can you prevent sexually transmitted infections (STIs)? You can help prevent STIs if you wait to have sex with a new partner (or partners) until you've each been tested for STIs. It also helps if you use condoms (male or female condoms) and if you limit your sex partners to one person who only has sex with you. When should you call for help? Call your doctor now or seek immediate medical care if:    · You have new pain in your belly or pelvis.     · You have symptoms of a urinary tract infection. These may include:  ? Pain or burning when you urinate.   ? A frequent need to urinate without being able to pass much urine. ? Pain in the flank, which is just below the rib cage and above the waist on either side of the back. ? Blood in your urine. ? A fever.     · You have new or worsening pain or swelling in the scrotum. Watch closely for changes in your health, and be sure to contact your doctor if:    · You have unusual vaginal bleeding.     · You have a discharge from the vagina or penis.     · You have any new symptoms, such as sores, bumps, rashes, blisters, or warts.     · You have itching, tingling, pain, or burning in the genital or anal area.     · You think you may have an STI. Where can you learn more? Go to http://www.gray.com/  Enter M049 in the search box to learn more about \"Exposure to Sexually Transmitted Infections: Care Instructions. \"  Current as of: February 26, 2020               Content Version: 12.8  © 8126-9580 [employer]. Care instructions adapted under license by [employer] (which disclaims liability or warranty for this information). If you have questions about a medical condition or this instruction, always ask your healthcare professional. [place] [address] any warranty or liability for your use of this information.

## 2021-07-22 NOTE — PROGRESS NOTES
Alvarez Mansfield is a 41 y.o. female who complains of vaginal odor after IC (fishy) and who also desires STD testing. Unsure of trust-worthyness of partner  Has hx of BV, uses pH suppositories     Her current method of family planning is tubal ligation. The patient is sexually active. She developed this problem approximately 1 weeks ago. Also has had some stool incontinence. Has had Anal IC in past, but none recently. Her relevant past medical history:   Past Medical History:   Diagnosis Date    Chronic pain of both knees 10/16/2020    Gastritis, Helicobacter pylori     Hypertension     Obesity         Past Surgical History:   Procedure Laterality Date    HX TUBAL LIGATION  2000    IR CHOLECYSTOSTOMY PERCUTANEOUS      DC LAP,TUBAL CAUTERY  2000     Social History     Occupational History    Not on file   Tobacco Use    Smoking status: Former Smoker     Packs/day: 0.25     Quit date: 3/16/2018     Years since quitting: 3.3    Smokeless tobacco: Never Used   Vaping Use    Vaping Use: Never used   Substance and Sexual Activity    Alcohol use: Yes     Comment: occ    Drug use: No    Sexual activity: Yes     Partners: Male     Birth control/protection: Surgical     Comment: Tubal ligation. Family History   Problem Relation Age of Onset    Hypertension Mother     Hypertension Sister        No Known Allergies  Prior to Admission medications    Medication Sig Start Date End Date Taking? Authorizing Provider   amLODIPine (NORVASC) 10 mg tablet Take 1 tablet by mouth once daily 7/13/21   Sal Parikh,    hydroCHLOROthiazide (HYDRODIURIL) 25 mg tablet TAKE 1 TABLET BY MOUTH ONCE DAILY 7/13/21   Akua Phillip, DO   loperamide (Anti-Diarrheal, Loperamide,) 2 mg capsule Take 1 Cap by mouth four (4) times daily as needed for Diarrhea. 5/4/21   Akua Phillip, DO   colestipoL (COLESTID) 1 gram tablet Take 1 Tab by mouth two (2) times a day.  5/4/21   Akua Phillip, DO   amLODIPine ([address]) 10 mg tablet Take 1 tablet by mouth once daily 4/30/21   Santo Thomas NP   lidocaine (LIDODERM) 5 % APPLY ONE PATCH TOPICALLY TO CLEAN DRY SKIN. LEAVE ON FOR 12 HOURS THEN REMOVE. MUST WAIT AT LEAST 12 HOURS BEFORE APPLYING PATCH(ES) AGAIN. 4/1/20   Mehrdad Sheffield   traZODone (DESYREL) 50 mg tablet Take 1 Tab by mouth nightly. 1/14/20   Annika Ortiz DO        Review of Systems - History obtained from the patient  Constitutional: negative for weight loss, fever, night sweats  HEENT: negative for hearing loss, earache, congestion, snoring, sorethroat  CV: negative for chest pain, palpitations, edema  Resp: negative for cough, shortness of breath, wheezing  Breast: negative for breast lumps, nipple discharge, galactorrhea  GI: negative for change in bowel habits, abdominal pain, black or bloody stools  : negative for frequency, dysuria, hematuria  MSK: negative for back pain, joint pain, muscle pain  Skin: negative for itching, rash, hives  Neuro: negative for dizziness, headache, confusion, weakness  Psych: negative for anxiety, depression, change in mood  Heme/lymph: negative for bleeding, bruising, pallor      Objective: There were no vitals taken for this visit.        PHYSICAL EXAMINATION    Constitutional  · Appearance: well-nourished, well developed, alert, in no acute distress    HENT  · Head and Face: appears normal    Neck  · Inspection/Palpation: normal appearance      Genitourinary  · External Genitalia: normal appearance for age, no discharge present, no tenderness present, no inflammatory lesions present, no masses present, no atrophy present  · Vagina: normal vaginal vault without central or paravaginal defects, no discharge present, no inflammatory lesions present, no masses present  · Bladder: non-tender to palpation  · Urethra: appears normal  · Perineum: perineum within normal limits, no evidence of trauma, no rashes or skin lesions present  · Anus: anus within normal limits, no hemorrhoids present  · Inguinal Lymph Nodes: no lymphadenopathy present    Skin  · General Inspection: no rash, no lesions identified    Neurologic/Psychiatric  · Mental Status:  · Orientation: grossly oriented to person, place and time  · Mood and Affect: mood normal, affect appropriate    Assessment:      Fecal incontinence (smearing)  Vaginitis with odor  Possible Exposure to STD    Plan:   Treated empirically with Metrogel for BV  Referral to Gastro for fecal incontinence  Mychart message results for STI screening  Patient declines presence of chaperone during today's visit. Khadijah Luna.  BRISSA Escalera/LILIAN

## 2021-07-24 LAB
A VAGINAE DNA VAG QL NAA+PROBE: ABNORMAL SCORE
BVAB2 DNA VAG QL NAA+PROBE: ABNORMAL SCORE
C ALBICANS DNA VAG QL NAA+PROBE: NEGATIVE
C GLABRATA DNA VAG QL NAA+PROBE: NEGATIVE
C TRACH DNA VAG QL NAA+PROBE: NEGATIVE
MEGA1 DNA VAG QL NAA+PROBE: ABNORMAL SCORE
N GONORRHOEA DNA VAG QL NAA+PROBE: NEGATIVE
SPECIMEN STATUS REPORT, ROLRST: NORMAL
T VAGINALIS DNA VAG QL NAA+PROBE: NEGATIVE

## 2021-08-16 RX ORDER — FLUCONAZOLE 150 MG/1
150 TABLET ORAL DAILY
Qty: 1 TABLET | Refills: 0 | Status: SHIPPED | OUTPATIENT
Start: 2021-08-16 | End: 2021-08-17

## 2021-09-07 RX ORDER — FLUCONAZOLE 150 MG/1
150 TABLET ORAL DAILY
Qty: 1 TABLET | Refills: 0 | Status: SHIPPED | OUTPATIENT
Start: 2021-09-07 | End: 2021-09-08

## 2021-09-07 RX ORDER — METRONIDAZOLE 500 MG/1
500 TABLET ORAL 2 TIMES DAILY
Qty: 14 TABLET | Refills: 0 | Status: SHIPPED | OUTPATIENT
Start: 2021-09-07 | End: 2021-09-14

## 2022-02-23 ENCOUNTER — OFFICE VISIT (OUTPATIENT)
Dept: INTERNAL MEDICINE CLINIC | Age: 42
End: 2022-02-23
Payer: MEDICAID

## 2022-02-23 VITALS
HEIGHT: 71 IN | TEMPERATURE: 98.4 F | DIASTOLIC BLOOD PRESSURE: 78 MMHG | WEIGHT: 284.4 LBS | RESPIRATION RATE: 16 BRPM | SYSTOLIC BLOOD PRESSURE: 138 MMHG | BODY MASS INDEX: 39.81 KG/M2 | HEART RATE: 65 BPM | OXYGEN SATURATION: 99 %

## 2022-02-23 DIAGNOSIS — I10 ESSENTIAL HYPERTENSION: ICD-10-CM

## 2022-02-23 DIAGNOSIS — E55.9 VITAMIN D DEFICIENCY: ICD-10-CM

## 2022-02-23 DIAGNOSIS — E66.9 OBESITY (BMI 35.0-39.9 WITHOUT COMORBIDITY): ICD-10-CM

## 2022-02-23 DIAGNOSIS — G43.909 MIGRAINE WITHOUT STATUS MIGRAINOSUS, NOT INTRACTABLE, UNSPECIFIED MIGRAINE TYPE: Primary | ICD-10-CM

## 2022-02-23 DIAGNOSIS — R51.9 FRONTAL HEADACHE: ICD-10-CM

## 2022-02-23 DIAGNOSIS — Z11.59 NEED FOR HEPATITIS C SCREENING TEST: ICD-10-CM

## 2022-02-23 PROCEDURE — 99214 OFFICE O/P EST MOD 30 MIN: CPT | Performed by: INTERNAL MEDICINE

## 2022-02-23 RX ORDER — BUTALBITAL, ACETAMINOPHEN AND CAFFEINE 50; 325; 40 MG/1; MG/1; MG/1
1 TABLET ORAL
Qty: 15 TABLET | Refills: 0 | Status: SHIPPED | OUTPATIENT
Start: 2022-02-23 | End: 2022-06-04

## 2022-02-23 RX ORDER — HYDROCHLOROTHIAZIDE 25 MG/1
TABLET ORAL
Qty: 90 TABLET | Refills: 1 | Status: SHIPPED | OUTPATIENT
Start: 2022-02-23

## 2022-02-23 RX ORDER — SUMATRIPTAN 100 MG/1
TABLET, FILM COATED ORAL
Qty: 10 TABLET | Refills: 0 | Status: SHIPPED | OUTPATIENT
Start: 2022-02-23 | End: 2022-06-04

## 2022-02-23 NOTE — PROGRESS NOTES
Health Maintenance Due   Topic Date Due    Hepatitis C Screening  Never done    DTaP/Tdap/Td series (1 - Tdap) Never done    Flu Vaccine (1) 09/01/2021    COVID-19 Vaccine (3 - Booster for Pfizer series) 09/26/2021       Chief Complaint   Patient presents with    Migraine    Pain (Chronic)    Stress       1. Have you been to the ER, urgent care clinic since your last visit? Hospitalized since your last visit? No    2. Have you seen or consulted any other health care providers outside of the [address] [place] [address] since your last visit? Include any pap smears or colon screening. No    3) Do you have an Advance Directive on file? no    4) Are you interested in receiving information on Advance Directives? NO      Patient is accompanied by self I have received verbal consent from Jaret Nava to discuss any/all medical information while they are present in the room.

## 2022-02-24 LAB
25(OH)D3+25(OH)D2 SERPL-MCNC: 12.3 NG/ML (ref 30–100)
ALBUMIN SERPL-MCNC: 4.3 G/DL (ref 3.8–4.8)
ALBUMIN/GLOB SERPL: 1.3 {RATIO} (ref 1.2–2.2)
ALP SERPL-CCNC: 80 IU/L (ref 44–121)
ALT SERPL-CCNC: 15 IU/L (ref 0–32)
AST SERPL-CCNC: 14 IU/L (ref 0–40)
BILIRUB SERPL-MCNC: 0.5 MG/DL (ref 0–1.2)
BUN SERPL-MCNC: 11 MG/DL (ref 6–24)
BUN/CREAT SERPL: 12 (ref 9–23)
CALCIUM SERPL-MCNC: 9.4 MG/DL (ref 8.7–10.2)
CHLORIDE SERPL-SCNC: 103 MMOL/L (ref 96–106)
CHOLEST SERPL-MCNC: 192 MG/DL (ref 100–199)
CO2 SERPL-SCNC: 22 MMOL/L (ref 20–29)
CREAT SERPL-MCNC: 0.91 MG/DL (ref 0.57–1)
ERYTHROCYTE [DISTWIDTH] IN BLOOD BY AUTOMATED COUNT: 12.1 % (ref 11.7–15.4)
GLOBULIN SER CALC-MCNC: 3.2 G/DL (ref 1.5–4.5)
GLUCOSE SERPL-MCNC: 77 MG/DL (ref 65–99)
HCT VFR BLD AUTO: 38.9 % (ref 34–46.6)
HCV AB S/CO SERPL IA: <0.1 S/CO RATIO (ref 0–0.9)
HDLC SERPL-MCNC: 59 MG/DL
HGB BLD-MCNC: 12.9 G/DL (ref 11.1–15.9)
IMP & REVIEW OF LAB RESULTS: NORMAL
LDLC SERPL CALC-MCNC: 121 MG/DL (ref 0–99)
MCH RBC QN AUTO: 26.9 PG (ref 26.6–33)
MCHC RBC AUTO-ENTMCNC: 33.2 G/DL (ref 31.5–35.7)
MCV RBC AUTO: 81 FL (ref 79–97)
PLATELET # BLD AUTO: 400 X10E3/UL (ref 150–450)
POTASSIUM SERPL-SCNC: 4.2 MMOL/L (ref 3.5–5.2)
PROT SERPL-MCNC: 7.5 G/DL (ref 6–8.5)
RBC # BLD AUTO: 4.79 X10E6/UL (ref 3.77–5.28)
SODIUM SERPL-SCNC: 138 MMOL/L (ref 134–144)
TRIGL SERPL-MCNC: 66 MG/DL (ref 0–149)
TSH SERPL DL<=0.005 MIU/L-ACNC: 1.12 UIU/ML (ref 0.45–4.5)
VLDLC SERPL CALC-MCNC: 12 MG/DL (ref 5–40)
WBC # BLD AUTO: 6.3 X10E3/UL (ref 3.4–10.8)

## 2022-02-25 ENCOUNTER — OFFICE VISIT (OUTPATIENT)
Dept: OBGYN CLINIC | Age: 42
End: 2022-02-25
Payer: MEDICAID

## 2022-02-25 VITALS — BODY MASS INDEX: 39.89 KG/M2 | SYSTOLIC BLOOD PRESSURE: 134 MMHG | DIASTOLIC BLOOD PRESSURE: 84 MMHG | WEIGHT: 286 LBS

## 2022-02-25 DIAGNOSIS — N89.8 VAGINAL IRRITATION: Primary | ICD-10-CM

## 2022-02-25 PROBLEM — R10.10 PAIN OF UPPER ABDOMEN: Status: ACTIVE | Noted: 2018-05-15

## 2022-02-25 PROBLEM — N76.0 ACUTE VAGINITIS: Status: ACTIVE | Noted: 2022-02-25

## 2022-02-25 PROCEDURE — 99213 OFFICE O/P EST LOW 20 MIN: CPT | Performed by: OBSTETRICS & GYNECOLOGY

## 2022-02-25 RX ORDER — TERCONAZOLE 4 MG/G
1 CREAM VAGINAL
Qty: 45 G | Refills: 0 | Status: SHIPPED | OUTPATIENT
Start: 2022-02-25 | End: 2022-06-04

## 2022-02-25 NOTE — PROGRESS NOTES
Vaginitis  CC: Vaginal discharge/ irritation    HPI: Ms. Vanda Bourne is a 35 y.o.  female presenting for evaluation of vaginal discharge. She has not had previous treatment for this problem. Pt notes itching/irritation. Notes it started after condom broke. Onset: several days  Quality: thick thin    Odor: no  Relation to menses? no  Pruritis? no  Irritation? yes    Dysuria? no  Pain? no    OB History        2    Para   2    Term   2            AB   0    Living   2       SAB   0    IAB   0    Ectopic        Molar        Multiple        Live Births   2                Past Medical History:   Diagnosis Date    Chronic pain of both knees 10/16/2020    Gastritis, Helicobacter pylori     Hypertension     Migraine without status migrainosus, not intractable, unspecified migraine type 2022    Obesity        Past Surgical History:   Procedure Laterality Date    HX ORTHOPAEDIC  2021    carpel tunnel surgery - L hand    HX TUBAL LIGATION      IR CHOLECYSTOSTOMY PERCUTANEOUS         Family History   Problem Relation Age of Onset    Hypertension Mother     Hypertension Sister        Social History     Socioeconomic History    Marital status: SINGLE     Spouse name: Not on file    Number of children: Not on file    Years of education: Not on file    Highest education level: Not on file   Occupational History    Not on file   Tobacco Use    Smoking status: Former Smoker     Packs/day: 0.25     Quit date: 3/16/2018     Years since quitting: 3.9    Smokeless tobacco: Never Used   Vaping Use    Vaping Use: Never used   Substance and Sexual Activity    Alcohol use: Yes     Comment: occ    Drug use: No    Sexual activity: Yes     Partners: Male     Birth control/protection: Surgical     Comment: Tubal ligation.    Other Topics Concern    Not on file   Social History Narrative    Not on file     Social Determinants of Health     Financial Resource Strain:     Difficulty of Paying Living Expenses: Not on file   Food Insecurity:     Worried About Running Out of Food in the Last Year: Not on file    Ran Out of Food in the Last Year: Not on file   Transportation Needs:     Lack of Transportation (Medical): Not on file    Lack of Transportation (Non-Medical): Not on file   Physical Activity:     Days of Exercise per Week: Not on file    Minutes of Exercise per Session: Not on file   Stress:     Feeling of Stress : Not on file   Social Connections:     Frequency of Communication with Friends and Family: Not on file    Frequency of Social Gatherings with Friends and Family: Not on file    Attends Gnosticism Services: Not on file    Active Member of [address] [place] [address] [employer] or Organizations: Not on file    Attends Club or Organization Meetings: Not on file    Marital Status: Not on file   Intimate Partner Violence:     Fear of Current or Ex-Partner: Not on file    Emotionally Abused: Not on file    Physically Abused: Not on file    Sexually Abused: Not on file   Housing Stability:     Unable to Pay for Housing in the Last Year: Not on file    Number of [place] in the Last Year: Not on file    Unstable Housing in the Last Year: Not on file       Current Outpatient Medications   Medication Sig Dispense Refill    SUMAtriptan (IMITREX) 100 mg tablet 1 bid prn migraine HA 10 Tablet 0    butalbital-acetaminophen-caffeine (FIORICET, ESGIC) -40 mg per tablet Take 1 Tablet by mouth three (3) times daily as needed for Headache. 15 Tablet 0    hydroCHLOROthiazide (HYDRODIURIL) 25 mg tablet TAKE 1 TABLET BY MOUTH ONCE DAILY 90 Tablet 1    amLODIPine (NORVASC) 10 mg tablet Take 1 tablet by mouth once daily 90 Tablet 1    loperamide (Anti-Diarrheal, Loperamide,) 2 mg capsule Take 1 Cap by mouth four (4) times daily as needed for Diarrhea. 30 Cap 0    colestipoL (COLESTID) 1 gram tablet Take 1 Tab by mouth two (2) times a day.  30 Tab 0    lidocaine (LIDODERM) 5 % APPLY ONE PATCH TOPICALLY TO CLEAN DRY SKIN. LEAVE ON FOR 12 HOURS THEN REMOVE. MUST WAIT AT LEAST 12 HOURS BEFORE APPLYING PATCH(ES) AGAIN.  traZODone (DESYREL) 50 mg tablet Take 1 Tab by mouth nightly. 90 Tab 1       No Known Allergies    Physical Exam    Visit Vitals  /84 (BP 1 Location: Left upper arm, BP Patient Position: Sitting)   Wt 286 lb (129.7 kg)   BMI 39.89 kg/m²       HENT  · Head and Face: appears normal    Genitourinary  · External Genitalia: normal appearance for age, clumpy white discharge present, no tenderness present, no inflammatory lesions present, no masses present, no atrophy present  · Vagina: normal vaginal vault without central or paravaginal defects, no inflammatory lesions present, no masses present, clumpy white discharge present  · Bladder: non-tender to palpation  · Urethra: appears normal  · Cervix: normal, clumpy white dsicharge  · Perineum: perineum within normal limits, no evidence of trauma, no rashes or skin lesions present    Skin  · General Inspection: no rash, no lesions identified    Neurologic/Psychiatric  · Mental Status:  · Orientation: grossly oriented to person, place and time  · Mood and Affect: mood normal, affect appropriate    Results for orders placed or performed in visit on 33/05/46   METABOLIC PANEL, COMPREHENSIVE   Result Value Ref Range    Glucose 77 65 - 99 mg/dL    BUN 11 6 - 24 mg/dL    Creatinine 0.91 0.57 - 1.00 mg/dL    GFR est non-AA 78 >59 mL/min/1.73    GFR est AA 89 >59 mL/min/1.73    BUN/Creatinine ratio 12 9 - 23    Sodium 138 134 - 144 mmol/L    Potassium 4.2 3.5 - 5.2 mmol/L    Chloride 103 96 - 106 mmol/L    CO2 22 20 - 29 mmol/L    Calcium 9.4 8.7 - 10.2 mg/dL    Protein, total 7.5 6.0 - 8.5 g/dL    Albumin 4.3 3.8 - 4.8 g/dL    GLOBULIN, TOTAL 3.2 1.5 - 4.5 g/dL    A-G Ratio 1.3 1.2 - 2.2    Bilirubin, total 0.5 0.0 - 1.2 mg/dL    Alk.  phosphatase 80 44 - 121 IU/L    AST (SGOT) 14 0 - 40 IU/L    ALT (SGPT) 15 0 - 32 IU/L   CBC W/O DIFF   Result Value Ref Range    WBC 6.3 3.4 - 10.8 x10E3/uL    RBC 4.79 3.77 - 5.28 x10E6/uL    HGB 12.9 11.1 - 15.9 g/dL    HCT 38.9 34.0 - 46.6 %    MCV 81 79 - 97 fL    MCH 26.9 26.6 - 33.0 pg    MCHC 33.2 31.5 - 35.7 g/dL    RDW 12.1 11.7 - 15.4 %    PLATELET 322 587 - 017 x10E3/uL   LIPID PANEL   Result Value Ref Range    Cholesterol, total 192 100 - 199 mg/dL    Triglyceride 66 0 - 149 mg/dL    HDL Cholesterol 59 >39 mg/dL    VLDL, calculated 12 5 - 40 mg/dL    LDL, calculated 121 (H) 0 - 99 mg/dL   TSH 3RD GENERATION   Result Value Ref Range    TSH 1.120 0.450 - 4.500 uIU/mL   VITAMIN D, 25 HYDROXY   Result Value Ref Range    VITAMIN D, 25-HYDROXY 12.3 (L) 30.0 - 100.0 ng/mL   HEPATITIS C AB   Result Value Ref Range    Hep C Virus Ab <0.1 0.0 - 0.9 s/co ratio   CVD REPORT   Result Value Ref Range    INTERPRETATION Note            Assessment/Plan:  Vaginitis     Nuswab sent   Rx for terconazole sent. Counseled on safe sexual practices (barrier contraception). Reviewed vulvar/vaginal hygiene and irritant avoidance. RTC: for annual , or sooner prn for problems or concerns. Handouts and instructions provided.     Gavino Drake  2/25/2022  11:09 AM  Giuliana Bates MD

## 2022-02-28 LAB
A VAGINAE DNA VAG QL NAA+PROBE: NORMAL SCORE
BVAB2 DNA VAG QL NAA+PROBE: NORMAL SCORE
C ALBICANS DNA VAG QL NAA+PROBE: NEGATIVE
C GLABRATA DNA VAG QL NAA+PROBE: NEGATIVE
C TRACH DNA VAG QL NAA+PROBE: NEGATIVE
MEGA1 DNA VAG QL NAA+PROBE: NORMAL SCORE
N GONORRHOEA DNA VAG QL NAA+PROBE: NEGATIVE
SPECIMEN STATUS REPORT, ROLRST: NORMAL
T VAGINALIS DNA VAG QL NAA+PROBE: NEGATIVE

## 2022-03-04 DIAGNOSIS — E55.9 VITAMIN D DEFICIENCY: Primary | ICD-10-CM

## 2022-03-04 RX ORDER — ERGOCALCIFEROL 1.25 MG/1
50000 CAPSULE ORAL
Qty: 12 CAPSULE | Refills: 0 | Status: SHIPPED | OUTPATIENT
Start: 2022-03-04

## 2022-03-04 NOTE — PROGRESS NOTES
Low vit D--- start Vit D 50k u weekly x 12 weeks then 1000 units daily    Borderline High cholesterol ---- watch fatty food/exercise    O/w All labs are stable

## 2022-03-09 RX ORDER — FLUCONAZOLE 150 MG/1
150 TABLET ORAL DAILY
Qty: 1 TABLET | Refills: 0 | Status: SHIPPED | OUTPATIENT
Start: 2022-03-09 | End: 2022-03-10

## 2022-03-11 DIAGNOSIS — I10 ESSENTIAL HYPERTENSION: ICD-10-CM

## 2022-03-11 RX ORDER — AMLODIPINE BESYLATE 10 MG/1
TABLET ORAL
Qty: 90 TABLET | Refills: 1 | Status: SHIPPED | OUTPATIENT
Start: 2022-03-11

## 2022-03-11 NOTE — PROGRESS NOTES
HISTORY OF PRESENT ILLNESS  Mary Mcgraw is a 35 y.o. female. Pt. comes in for f/u. Has a few chronic medical issues as documented including HTN, obesity, migraines. Vital signs are stable. BMI is 39.9. Has gained more weight. Reports a few days of increased headaches. Mostly frontal.  Also some top and back of the head. At times throbbing. Facial vision. No nausea or vomiting. No other focal neurological issues. Has chronic arthritic pains especially knees. Chronic GI issues are stable on current medications. All other chronic medical issues are stable on current treatment regimen. Has had Covid-19 vaccination. Reports taking proper precautions. Denies any related signs or symptoms. PMH/PSH/Allergies/Social History/medication list and most recent studies reviewed with patient. Tobacco use: No  Alcohol use: Social    Reports compliance with medications and diet. Trying to be active physically to control weight. Reports no other new c/o. HPI    Review of Systems   Constitutional: Negative. HENT: Negative. Eyes: Negative. Respiratory: Negative for shortness of breath. Cardiovascular: Negative for chest pain and leg swelling. Gastrointestinal: Negative. Genitourinary: Negative for dysuria. Musculoskeletal: Positive for back pain and joint pain. Negative for falls, myalgias and neck pain. Skin: Negative. Neurological: Positive for headaches. Negative for dizziness, speech change and weakness. Endo/Heme/Allergies: Negative. Psychiatric/Behavioral: Negative for depression. The patient has insomnia. The patient is not nervous/anxious. All other systems reviewed and are negative. Physical Exam  Vitals and nursing note reviewed. Constitutional:       General: She is not in acute distress. Appearance: She is well-developed. Comments: Pleasant lady  Obese   HENT:      Head: Normocephalic and atraumatic.       Nose: Nose normal.      Mouth/Throat: Mouth: Mucous membranes are moist.   Eyes:      General: No scleral icterus. Conjunctiva/sclera: Conjunctivae normal.   Neck:      Vascular: No carotid bruit. Comments: Left neck/supraclavicular mass/lipoma  Cardiovascular:      Rate and Rhythm: Normal rate and regular rhythm. Heart sounds: Normal heart sounds. No murmur heard. Pulmonary:      Effort: Pulmonary effort is normal. No respiratory distress. Breath sounds: Normal breath sounds. Abdominal:      General: Bowel sounds are normal. There is no distension. Palpations: Abdomen is soft. Tenderness: There is no left CVA tenderness. Musculoskeletal:         General: Tenderness ( upper back/neck) present. No swelling or signs of injury. Normal range of motion. Cervical back: Normal range of motion and neck supple. Skin:     General: Skin is warm and dry. Findings: No rash. Neurological:      General: No focal deficit present. Mental Status: She is alert and oriented to person, place, and time. Motor: No weakness. Coordination: Coordination normal.      Gait: Gait normal.   Psychiatric:         Behavior: Behavior normal.         ASSESSMENT and PLAN  Diagnoses and all orders for this visit:    1. Migraine without status migrainosus, not intractable, unspecified migraine type  -     SUMAtriptan (IMITREX) 100 mg tablet; 1 bid prn migraine HA  -     butalbital-acetaminophen-caffeine (FIORICET, ESGIC) -40 mg per tablet; Take 1 Tablet by mouth three (3) times daily as needed for Headache. 2. Frontal headache    3. Essential hypertension  -     LIPID PANEL; Future  -     METABOLIC PANEL, COMPREHENSIVE; Future  -     CBC W/O DIFF; Future  -     TSH 3RD GENERATION; Future  -     VITAMIN D, 25 HYDROXY; Future  -     hydroCHLOROthiazide (HYDRODIURIL) 25 mg tablet; TAKE 1 TABLET BY MOUTH ONCE DAILY  Monitor BP at home with goal of 140/90 or less   Stable chronic condition.   Continue current treatment/medications. 4. Obesity (BMI 35.0-39.9 without comorbidity)  -     LIPID PANEL; Future  -     METABOLIC PANEL, COMPREHENSIVE; Future  -     CBC W/O DIFF; Future  -     TSH 3RD GENERATION; Future  -     VITAMIN D, 25 HYDROXY; Future    5. Need for hepatitis C screening test  -     HEPATITIS C AB; Future    6. Vitamin D deficiency  -     VITAMIN D, 25 HYDROXY; Future    Other orders  -     METABOLIC PANEL, COMPREHENSIVE  -     CBC W/O DIFF  -     LIPID PANEL  -     TSH 3RD GENERATION  -     VITAMIN D, 25 HYDROXY  -     HEPATITIS C AB  -     CVD REPORT      Follow-up and Dispositions    · Return in about 6 months (around 8/23/2022). All chronic medical problems are stable  Continue with current medical management and plan  lab results and schedule of future lab studies reviewed with patient  reviewed diet, exercise and weight control  reviewed medications and side effects in detail  F/u with other MD's/ providers as scheduled  COVID-19 precautions discussed with pt  An After Visit Summary was printed and given to the patient.

## 2022-03-18 PROBLEM — I10 ESSENTIAL HYPERTENSION: Status: ACTIVE | Noted: 2018-05-15

## 2022-03-18 PROBLEM — K29.70 GASTRITIS WITHOUT BLEEDING: Status: ACTIVE | Noted: 2018-05-15

## 2022-03-18 PROBLEM — R10.10 PAIN OF UPPER ABDOMEN: Status: ACTIVE | Noted: 2018-05-15

## 2022-03-18 PROBLEM — N76.0 ACUTE VAGINITIS: Status: ACTIVE | Noted: 2022-02-25

## 2022-03-19 PROBLEM — G89.29 CHRONIC PAIN OF BOTH KNEES: Status: ACTIVE | Noted: 2020-10-16

## 2022-03-19 PROBLEM — K29.70 HELICOBACTER PYLORI GASTRITIS: Status: ACTIVE | Noted: 2018-05-21

## 2022-03-19 PROBLEM — D17.0 LIPOMA OF NECK: Status: ACTIVE | Noted: 2018-10-01

## 2022-03-19 PROBLEM — E66.9 OBESITY (BMI 35.0-39.9 WITHOUT COMORBIDITY): Status: ACTIVE | Noted: 2018-05-15

## 2022-03-19 PROBLEM — R76.8 HELICOBACTER PYLORI ANTIBODY POSITIVE: Status: ACTIVE | Noted: 2018-05-21

## 2022-03-19 PROBLEM — B96.81 HELICOBACTER PYLORI GASTRITIS: Status: ACTIVE | Noted: 2018-05-21

## 2022-03-19 PROBLEM — M25.562 CHRONIC PAIN OF BOTH KNEES: Status: ACTIVE | Noted: 2020-10-16

## 2022-03-19 PROBLEM — G43.909 MIGRAINE WITHOUT STATUS MIGRAINOSUS, NOT INTRACTABLE, UNSPECIFIED MIGRAINE TYPE: Status: ACTIVE | Noted: 2022-02-23

## 2022-03-19 PROBLEM — M25.561 CHRONIC PAIN OF BOTH KNEES: Status: ACTIVE | Noted: 2020-10-16

## 2022-03-19 PROBLEM — E66.01 SEVERE OBESITY (HCC): Status: ACTIVE | Noted: 2019-06-27

## 2022-03-20 PROBLEM — G47.00 INSOMNIA: Status: ACTIVE | Noted: 2018-05-15

## 2022-03-21 ENCOUNTER — TELEPHONE (OUTPATIENT)
Dept: INTERNAL MEDICINE CLINIC | Age: 42
End: 2022-03-21

## 2022-03-21 DIAGNOSIS — N60.09 CYST OF BREAST, UNSPECIFIED LATERALITY: Primary | ICD-10-CM

## 2022-03-21 NOTE — TELEPHONE ENCOUNTER
Returned call to pt and spoke with her she states she has new areas in her left breast , She is due for another mammo , Will pend orders for provider to sign

## 2022-03-21 NOTE — TELEPHONE ENCOUNTER
----- Message from Eva sent at 3/21/2022 10:08 AM EDT -----  Subject: Referral Request    QUESTIONS   Reason for referral request? Patient requesting Mammogram found two new   lumps Breast center requesting order for Ultrasound due to new lumps. Has the physician seen you for this condition before? No   Preferred Specialist (if applicable)? Do you already have an appointment scheduled? No  Additional Information for Provider?   ---------------------------------------------------------------------------  --------------  CALL BACK INFO  What is the best way for the office to contact you? OK to leave message on   voicemail  Preferred Call Back Phone Number?  [phone]

## 2022-03-23 ENCOUNTER — TELEPHONE (OUTPATIENT)
Dept: INTERNAL MEDICINE CLINIC | Age: 42
End: 2022-03-23

## 2022-03-23 DIAGNOSIS — Z12.31 ENCOUNTER FOR MAMMOGRAM TO ESTABLISH BASELINE MAMMOGRAM: Primary | ICD-10-CM

## 2022-03-23 NOTE — TELEPHONE ENCOUNTER
Carl Cordon from :[facility]:needs a New Order for a Diagnotic Mammo,the US/breast order is ok.   lov-2/23/22  No up coming appt:

## 2022-03-25 ENCOUNTER — TRANSCRIBE ORDER (OUTPATIENT)
Dept: SCHEDULING | Age: 42
End: 2022-03-25

## 2022-03-25 DIAGNOSIS — Z12.31 ENCOUNTER FOR MAMMOGRAM TO ESTABLISH BASELINE MAMMOGRAM: Primary | ICD-10-CM

## 2022-04-05 ENCOUNTER — HOSPITAL ENCOUNTER (OUTPATIENT)
Dept: MAMMOGRAPHY | Age: 42
Discharge: HOME | End: 2022-04-05
Attending: INTERNAL MEDICINE
Payer: MEDICAID

## 2022-04-05 ENCOUNTER — TELEPHONE (OUTPATIENT)
Dept: INTERNAL MEDICINE CLINIC | Age: 42
End: 2022-04-05

## 2022-04-05 ENCOUNTER — APPOINTMENT (OUTPATIENT)
Dept: MAMMOGRAPHY | Age: 42
End: 2022-04-05
Attending: INTERNAL MEDICINE
Payer: MEDICAID

## 2022-04-05 DIAGNOSIS — Z12.31 ENCOUNTER FOR MAMMOGRAM TO ESTABLISH BASELINE MAMMOGRAM: ICD-10-CM

## 2022-04-05 DIAGNOSIS — N60.09 CYST OF BREAST, UNSPECIFIED LATERALITY: ICD-10-CM

## 2022-04-05 DIAGNOSIS — N63.20 LUMP OF LEFT BREAST: ICD-10-CM

## 2022-04-05 PROCEDURE — 76642 ULTRASOUND BREAST LIMITED: CPT

## 2022-04-05 PROCEDURE — 77062 BREAST TOMOSYNTHESIS BI: CPT

## 2022-04-05 NOTE — PROGRESS NOTES
Results of mammogram were called to Dr. Ailyn Carmona office. I spoke with  Ghassan. They will let doctor know about cyst aspiration/ possible biopsy. They will also sign  order in CC for biopsy.

## 2022-04-05 NOTE — TELEPHONE ENCOUNTER
Spoke to [place] from the [facility] is asking for Cee Tejada to sign    off on the US Breast Bx orders on 4/11/22  lov-2/23/22  Next appt:none

## 2022-04-11 ENCOUNTER — HOSPITAL ENCOUNTER (OUTPATIENT)
Dept: MAMMOGRAPHY | Age: 42
Discharge: HOME | End: 2022-04-11
Attending: INTERNAL MEDICINE
Payer: MEDICAID

## 2022-04-11 DIAGNOSIS — N63.20 LUMP OF LEFT BREAST: ICD-10-CM

## 2022-04-11 PROCEDURE — 74011000250 HC RX REV CODE- 250: Performed by: RADIOLOGY

## 2022-04-11 PROCEDURE — 19000 PUNCTURE ASPIR CYST BREAST: CPT

## 2022-04-11 RX ORDER — LIDOCAINE HYDROCHLORIDE 10 MG/ML
5 INJECTION INFILTRATION; PERINEURAL
Status: COMPLETED | OUTPATIENT
Start: 2022-04-11 | End: 2022-04-11

## 2022-04-11 RX ADMIN — LIDOCAINE HYDROCHLORIDE 5 ML: 10 INJECTION, SOLUTION INFILTRATION; PERINEURAL at 08:15

## 2022-04-11 NOTE — PROGRESS NOTES
Patient tolerated left breast cyst aspiration well with scant bleeding. Approximately 25cc normal appearing cyst fluid was aspirated and discarded. Aspiration site was covered with a band-aid and patient was instructed to keep the area clean and dry for at least 24 hours. She was provided with our phone number and encouraged to call with any questions or concerns.

## 2022-06-02 ENCOUNTER — NURSE TRIAGE (OUTPATIENT)
Dept: OTHER | Facility: CLINIC | Age: 42
End: 2022-06-02

## 2022-06-02 ENCOUNTER — DOCUMENTATION ONLY (OUTPATIENT)
Dept: INTERNAL MEDICINE CLINIC | Age: 42
End: 2022-06-02

## 2022-06-02 NOTE — TELEPHONE ENCOUNTER
Received call from [facility] at [facility] with Red Flag Complaint. Subjective: Caller states \"I have numbness in my fingers and hands. I have been taking a weight loss medicaition and it started then. \"     Current Symptoms: numbness in hands, feet, and lips. Dizziness after starting weight loss medication    Onset: 1 month ago; unchanged    Associated Symptoms: NA    Pain Severity: 0/10; Temperature: denies     What has been tried: NA    LMP: NA Pregnant: NA    Recommended disposition: See in Office Today or Tomorrow    Care advice provided, patient verbalizes understanding; denies any other questions or concerns; instructed to call back for any new or worsening symptoms. Patient/Caller agrees with recommended disposition; writer provided warm transfer to [facility] at [facility] for appointment scheduling    Attention Provider: Thank you for allowing me to participate in the care of your patient. The patient was connected to triage in response to information provided to the [facility]. Please do not respond through this encounter as the response is not directed to a shared pool.         Reason for Disposition   [1] MODERATE dizziness (e.g., interferes with normal activities) AND [2] has NOT been evaluated by physician for this  (Exception: dizziness caused by heat exposure, sudden standing, or poor fluid intake)    Protocols used: [facility]

## 2022-06-02 NOTE — PROGRESS NOTES
Pt states she recently started a weight loss medication- She is experiencing numbness in hands, dizziness,nausea, pt has not been eating much on appetite suppressant and she is not taking vitamin d. Pt states vision is not really blurred (only slightly when bending over and getting back up) pt denies slurred speech and is stable when moving around. Pt offered an appointment today at 11:15 with the NP but she declined since she is currently at her mothers house. Pt requested to see Dr. Andrzej Macias tomorrow morning- no available appointments. Pt advised to go to urgent care today as soon as possible. Pt given address to [facility].  Pt voiced understanding and states she will try to get there this evening and if not she will go at 8am tomorrow

## 2022-06-04 ENCOUNTER — HOSPITAL ENCOUNTER (EMERGENCY)
Age: 42
Discharge: STILL ADMITTED OR ADMINISTRATIVE | End: 2022-06-04

## 2022-06-04 ENCOUNTER — HOSPITAL ENCOUNTER (EMERGENCY)
Age: 42
Discharge: HOME | End: 2022-06-04
Attending: STUDENT IN AN ORGANIZED HEALTH CARE EDUCATION/TRAINING PROGRAM | Admitting: STUDENT IN AN ORGANIZED HEALTH CARE EDUCATION/TRAINING PROGRAM
Payer: MEDICAID

## 2022-06-04 VITALS
HEIGHT: 71 IN | BODY MASS INDEX: 36.42 KG/M2 | OXYGEN SATURATION: 99 % | SYSTOLIC BLOOD PRESSURE: 122 MMHG | WEIGHT: 260.14 LBS | RESPIRATION RATE: 18 BRPM | HEART RATE: 69 BPM | TEMPERATURE: 97.5 F | DIASTOLIC BLOOD PRESSURE: 70 MMHG

## 2022-06-04 DIAGNOSIS — R19.7 DIARRHEA OF PRESUMED INFECTIOUS ORIGIN: ICD-10-CM

## 2022-06-04 DIAGNOSIS — E87.6 HYPOKALEMIA: Primary | ICD-10-CM

## 2022-06-04 LAB
ALBUMIN SERPL-MCNC: 3.7 G/DL (ref 3.5–5)
ALBUMIN/GLOB SERPL: 0.9 {RATIO} (ref 1.1–2.2)
ALP SERPL-CCNC: 59 U/L (ref 45–117)
ALT SERPL-CCNC: 28 U/L (ref 12–78)
ANION GAP SERPL CALC-SCNC: 4 MMOL/L (ref 5–15)
AST SERPL-CCNC: 16 U/L (ref 15–37)
BASOPHILS # BLD: 0.1 K/UL (ref 0–0.1)
BASOPHILS NFR BLD: 1 % (ref 0–1)
BILIRUB SERPL-MCNC: 0.4 MG/DL (ref 0.2–1)
BUN SERPL-MCNC: 15 MG/DL (ref 6–20)
BUN/CREAT SERPL: 11 (ref 12–20)
CALCIUM SERPL-MCNC: 9 MG/DL (ref 8.5–10.1)
CHLORIDE SERPL-SCNC: 101 MMOL/L (ref 97–108)
CO2 SERPL-SCNC: 28 MMOL/L (ref 21–32)
CREAT SERPL-MCNC: 1.34 MG/DL (ref 0.55–1.02)
DIFFERENTIAL METHOD BLD: ABNORMAL
EOSINOPHIL # BLD: 0.2 K/UL (ref 0–0.4)
EOSINOPHIL NFR BLD: 3 % (ref 0–7)
ERYTHROCYTE [DISTWIDTH] IN BLOOD BY AUTOMATED COUNT: 13.1 % (ref 11.5–14.5)
GLOBULIN SER CALC-MCNC: 4 G/DL (ref 2–4)
GLUCOSE SERPL-MCNC: 87 MG/DL (ref 65–100)
HCT VFR BLD AUTO: 40.5 % (ref 35–47)
HGB BLD-MCNC: 13.1 G/DL (ref 11.5–16)
IMM GRANULOCYTES # BLD AUTO: 0 K/UL (ref 0–0.04)
IMM GRANULOCYTES NFR BLD AUTO: 1 % (ref 0–0.5)
LIPASE SERPL-CCNC: 254 U/L (ref 73–393)
LYMPHOCYTES # BLD: 2 K/UL (ref 0.8–3.5)
LYMPHOCYTES NFR BLD: 34 % (ref 12–49)
MAGNESIUM SERPL-MCNC: 2.2 MG/DL (ref 1.6–2.4)
MAGNESIUM SERPL-MCNC: 2.3 MG/DL (ref 1.6–2.4)
MCH RBC QN AUTO: 26.5 PG (ref 26–34)
MCHC RBC AUTO-ENTMCNC: 32.3 G/DL (ref 30–36.5)
MCV RBC AUTO: 81.8 FL (ref 80–99)
MONOCYTES # BLD: 0.6 K/UL (ref 0–1)
MONOCYTES NFR BLD: 11 % (ref 5–13)
NEUTS SEG # BLD: 3 K/UL (ref 1.8–8)
NEUTS SEG NFR BLD: 50 % (ref 32–75)
NRBC # BLD: 0 K/UL (ref 0–0.01)
NRBC BLD-RTO: 0 PER 100 WBC
PLATELET # BLD AUTO: 432 K/UL (ref 150–400)
PMV BLD AUTO: 9.7 FL (ref 8.9–12.9)
POTASSIUM SERPL-SCNC: 2.6 MMOL/L (ref 3.5–5.1)
POTASSIUM SERPL-SCNC: 3.4 MMOL/L (ref 3.5–5.1)
PROT SERPL-MCNC: 7.7 G/DL (ref 6.4–8.2)
RBC # BLD AUTO: 4.95 M/UL (ref 3.8–5.2)
SODIUM SERPL-SCNC: 133 MMOL/L (ref 136–145)
TROPONIN-HIGH SENSITIVITY: 6 NG/L (ref 0–51)
WBC # BLD AUTO: 5.9 K/UL (ref 3.6–11)

## 2022-06-04 PROCEDURE — 74011250636 HC RX REV CODE- 250/636: Performed by: STUDENT IN AN ORGANIZED HEALTH CARE EDUCATION/TRAINING PROGRAM

## 2022-06-04 PROCEDURE — 96374 THER/PROPH/DIAG INJ IV PUSH: CPT

## 2022-06-04 PROCEDURE — 83690 ASSAY OF LIPASE: CPT

## 2022-06-04 PROCEDURE — 93005 ELECTROCARDIOGRAM TRACING: CPT

## 2022-06-04 PROCEDURE — 96361 HYDRATE IV INFUSION ADD-ON: CPT

## 2022-06-04 PROCEDURE — 83735 ASSAY OF MAGNESIUM: CPT

## 2022-06-04 PROCEDURE — 99284 EMERGENCY DEPT VISIT MOD MDM: CPT

## 2022-06-04 PROCEDURE — 84132 ASSAY OF SERUM POTASSIUM: CPT

## 2022-06-04 PROCEDURE — 74011250637 HC RX REV CODE- 250/637: Performed by: STUDENT IN AN ORGANIZED HEALTH CARE EDUCATION/TRAINING PROGRAM

## 2022-06-04 PROCEDURE — 84484 ASSAY OF TROPONIN QUANT: CPT

## 2022-06-04 PROCEDURE — 85025 COMPLETE CBC W/AUTO DIFF WBC: CPT

## 2022-06-04 PROCEDURE — 80053 COMPREHEN METABOLIC PANEL: CPT

## 2022-06-04 PROCEDURE — 36415 COLL VENOUS BLD VENIPUNCTURE: CPT

## 2022-06-04 RX ORDER — POTASSIUM CHLORIDE 7.45 MG/ML
10 INJECTION INTRAVENOUS ONCE
Status: DISCONTINUED | OUTPATIENT
Start: 2022-06-04 | End: 2022-06-04

## 2022-06-04 RX ORDER — POTASSIUM CHLORIDE AND SODIUM CHLORIDE 900; 300 MG/100ML; MG/100ML
INJECTION, SOLUTION INTRAVENOUS CONTINUOUS
Status: DISCONTINUED | OUTPATIENT
Start: 2022-06-04 | End: 2022-06-04

## 2022-06-04 RX ORDER — POTASSIUM CHLORIDE 750 MG/1
20 TABLET, FILM COATED, EXTENDED RELEASE ORAL
Status: COMPLETED | OUTPATIENT
Start: 2022-06-04 | End: 2022-06-04

## 2022-06-04 RX ORDER — POTASSIUM CHLORIDE 7.45 MG/ML
10 INJECTION INTRAVENOUS
Status: COMPLETED | OUTPATIENT
Start: 2022-06-04 | End: 2022-06-04

## 2022-06-04 RX ORDER — LOPERAMIDE HYDROCHLORIDE 2 MG/1
2 CAPSULE ORAL
Qty: 20 CAPSULE | Refills: 0 | Status: SHIPPED | OUTPATIENT
Start: 2022-06-04 | End: 2022-06-14

## 2022-06-04 RX ORDER — POTASSIUM CHLORIDE 750 MG/1
10 TABLET, FILM COATED, EXTENDED RELEASE ORAL 2 TIMES DAILY
Qty: 10 TABLET | Refills: 0 | Status: SHIPPED | OUTPATIENT
Start: 2022-06-04 | End: 2022-06-09

## 2022-06-04 RX ADMIN — POTASSIUM CHLORIDE 10 MEQ: 7.46 INJECTION, SOLUTION INTRAVENOUS at 17:14

## 2022-06-04 RX ADMIN — POTASSIUM CHLORIDE 10 MEQ: 7.46 INJECTION, SOLUTION INTRAVENOUS at 16:15

## 2022-06-04 RX ADMIN — POTASSIUM CHLORIDE 20 MEQ: 750 TABLET, EXTENDED RELEASE ORAL at 16:01

## 2022-06-04 RX ADMIN — SODIUM CHLORIDE 1000 ML: 9 INJECTION, SOLUTION INTRAVENOUS at 16:02

## 2022-06-04 NOTE — ED NOTES
Discharge instructions reviewed with pt by provider. pt verbalized understanding of discharge instructions. Copy of discharge paperwork given. Patient condition stable, respiratory status within normal limits, neuro status intact. Ambulatory out of er.

## 2022-06-04 NOTE — Clinical Note
[facility]  [address]  [id number]  [phone]    Work/School Note    Date: 6/4/2022    To Whom It May concern:    Monique Cazares was seen and treated today in the emergency room by the following provider(s):  Attending Provider: Ericka Mary MD.      Monique Cazares is excused from work/school on 6/4/2022 through 6/6/2022. She is medically clear to return to work/school on 6/7/2022.          Sincerely,          Velma Arciniega MD

## 2022-06-04 NOTE — ED NOTES
Assumed care of patient. Pt resting in position of comfort. Call bell within reach. Pt presents to ED with reports of dizziness, numbness and tingling in hands/feet, diarrhea, sore throat and \"feeling weird\"    Pt reports for about a month she has been taking Topamax for weight loss. States that she has been having the intermittent numbness and tingling  for a while. Pt states \"my hands felt like bricks while I hand them on the steering wheel\" Pt states that she also has been multiple episodes of diarrhea/day x 1 week, decreased appetite. Reports her throat feels like it \"has something in it like it needs to be cleared\" states that her mom had some leftover amoxicillin or penicillin so the pt has been taking that for her throat but reports \"it isn't helping\" Pt speaking in full sentences, swallowing all secretions. Pt placed on monitor x 3.  Warm blanket provided

## 2022-06-04 NOTE — ED PROVIDER NOTES
Jeff Isabel is a 35 y.o. female with past medical history notable for H. pylori gastritis in the past, no recent symptoms however not currently on antacid therapy, hypertension, migraine, obesity, chronic knee pain presenting with diarrhea intermittently over the past week, lightheadedness and generalized weakness over the past several days, has noticed also feeling slightly off balance and that her muscles are weak, noticed that her hands were tingling and having difficulty gripping the steering wheel today. Denies lateralizing weakness. No abdominal pain. No fevers or chills. Denies GI bleeding. Past Medical History:   Diagnosis Date    Chronic pain of both knees 10/16/2020    Gastritis, Helicobacter pylori     Hypertension     Migraine without status migrainosus, not intractable, unspecified migraine type 2022    Obesity        Past Surgical History:   Procedure Laterality Date    HX ORTHOPAEDIC  2021    carpel tunnel surgery - L hand    HX TUBAL LIGATION      IR CHOLECYSTOSTOMY PERCUTANEOUS           Family History:   Problem Relation Age of Onset    Hypertension Mother     Hypertension Sister        Social History     Socioeconomic History    Marital status: SINGLE     Spouse name: Not on file    Number of children: Not on file    Years of education: Not on file    Highest education level: Not on file   Occupational History    Not on file   Tobacco Use    Smoking status: Former Smoker     Packs/day: 0.25     Quit date: 3/16/2018     Years since quittin.2    Smokeless tobacco: Never Used   Vaping Use    Vaping Use: Never used   Substance and Sexual Activity    Alcohol use: Yes     Comment: occ    Drug use: No    Sexual activity: Yes     Partners: Male     Birth control/protection: Surgical     Comment: Tubal ligation.    Other Topics Concern    Not on file   Social History Narrative    Not on file     Social Determinants of Health     Financial Resource Strain:     Difficulty of Paying Living Expenses: Not on file   Food Insecurity:     Worried About Running Out of Food in the Last Year: Not on file    Kathryn of Food in the Last Year: Not on file   Transportation Needs:     Lack of Transportation (Medical): Not on file    Lack of Transportation (Non-Medical): Not on file   Physical Activity:     Days of Exercise per Week: Not on file    Minutes of Exercise per Session: Not on file   Stress:     Feeling of Stress : Not on file   Social Connections:     Frequency of Communication with Friends and Family: Not on file    Frequency of Social Gatherings with Friends and Family: Not on file    Attends Roman [employer] Services: Not on file    Active Member of [address] [place] [address] [employer] or Organizations: Not on file    Attends Club or Organization Meetings: Not on file    Marital Status: Not on file   Intimate Partner Violence:     Fear of Current or Ex-Partner: Not on file    Emotionally Abused: Not on file    Physically Abused: Not on file    Sexually Abused: Not on file   Housing Stability:     Unable to Pay for Housing in the Last Year: Not on file    Number of [place] in the Last Year: Not on file    Unstable Housing in the Last Year: Not on file         ALLERGIES: Patient has no known allergies. Review of Systems   Constitutional: Positive for fatigue. Negative for chills and fever. Eyes: Negative for photophobia. Respiratory: Negative for shortness of breath. Cardiovascular: Negative for chest pain. Gastrointestinal: Negative for abdominal pain, nausea and vomiting. Genitourinary: Negative for dysuria. Musculoskeletal: Negative for back pain. Neurological: Negative for headaches. Psychiatric/Behavioral: Negative for confusion. All other systems reviewed and are negative.       Vitals:    06/04/22 1500 06/04/22 1550 06/04/22 1630 06/04/22 1855   BP: 135/74 123/83 117/69 122/70   Pulse: 75 76 61 69   Resp: 24 14 20 18   Temp: 97.5 °F (36.4 °C) SpO2: 99% 99% 99% 99%   Weight: 118 kg (260 lb 2.3 oz)      Height: 5' 11\" (1.803 m)               Physical Exam  Constitutional:       General: She is not in acute distress. Appearance: She is not toxic-appearing. HENT:      Head: Normocephalic and atraumatic. Mouth/Throat:      Mouth: Mucous membranes are moist.   Eyes:      Extraocular Movements: Extraocular movements intact. Cardiovascular:      Rate and Rhythm: Normal rate and regular rhythm. Heart sounds: Normal heart sounds. Pulmonary:      Effort: Pulmonary effort is normal. No respiratory distress. Breath sounds: Normal breath sounds. Abdominal:      Palpations: Abdomen is soft. Tenderness: There is no abdominal tenderness. Musculoskeletal:      Cervical back: Normal range of motion. Right lower leg: No edema. Left lower leg: No edema. Skin:     Capillary Refill: Capillary refill takes less than 2 seconds. Neurological:      General: No focal deficit present. Mental Status: She is alert and oriented to person, place, and time. Cranial Nerves: No cranial nerve deficit. Sensory: No sensory deficit. Motor: No weakness. Coordination: Coordination normal.   Psychiatric:         Mood and Affect: Mood normal.          [facility]  ED Course as of 22   Sat 2022   1505 EKNormal sinus rhythm, sinus arrhythmia, ventricular rate 64Normal axis, no ST elevation or depression. [NS]      ED Course User Index  [NS] Elizabeth Soto MD       MEDICAL DECISION MAKIN y.o. female presents with Diarrhea and Dizziness    Differential diagnosis includes but not limited to: Electrolyte abnormality, dehydration, no focal or lateralizing weakness to suggest a central etiology, no chest pain or abdominal pain, but exam is benign. Symptoms are likely related to hypokalemia.       LABORATORY TESTS:  Labs Reviewed   CBC WITH AUTOMATED DIFF - Abnormal; Notable for the following components: Result Value    PLATELET 422 (*)     IMMATURE GRANULOCYTES 1 (*)     All other components within normal limits   METABOLIC PANEL, COMPREHENSIVE - Abnormal; Notable for the following components:    Sodium 133 (*)     Potassium 2.6 (*)     Anion gap 4 (*)     Creatinine 1.34 (*)     BUN/Creatinine ratio 11 (*)     GFR est AA 52 (*)     GFR est non-AA 43 (*)     A-G Ratio 0.9 (*)     All other components within normal limits   POTASSIUM - Abnormal; Notable for the following components:    Potassium 3.4 (*)     All other components within normal limits   MAGNESIUM   TROPONIN-HIGH SENSITIVITY   LIPASE   MAGNESIUM   SAMPLES BEING HELD       IMAGING RESULTS:  No orders to display       MEDICATIONS GIVEN:  Medications   potassium chloride SR (KLOR-CON 10) tablet 20 mEq (20 mEq Oral Given 6/4/22 1601)   sodium chloride 0.9 % bolus infusion 1,000 mL (0 mL IntraVENous IV Completed 6/4/22 1828)   potassium chloride 10 mEq in 100 ml IVPB (0 mEq IntraVENous IV Completed 6/4/22 1715)       PROGRESS NOTE:   7:57 PM Patient's symptoms have improved after treatment    EKG:  Reviewed     CONSULTS:  none    IMPRESSION:  1. Hypokalemia    2. Diarrhea of presumed infectious origin        PLAN:  -   Discharge  Discharge Medication List as of 6/4/2022  6:59 PM      START taking these medications    Details   potassium chloride SR (Klor-Con 10) 10 mEq tablet Take 1 Tablet by mouth two (2) times a day for 5 days. , Normal, Disp-10 Tablet, R-0      loperamide (IMODIUM) 2 mg capsule Take 1 Capsule by mouth four (4) times daily as needed for Diarrhea for up to 10 days. , Normal, Disp-20 Capsule, R-0         CONTINUE these medications which have NOT CHANGED    Details   topiramate (TOPAMAX PO) Take  by mouth., Historical Med      amLODIPine (NORVASC) 10 mg tablet Take 1 tablet by mouth once daily, Normal, Disp-90 Tablet, R-1      hydroCHLOROthiazide (HYDRODIURIL) 25 mg tablet TAKE 1 TABLET BY MOUTH ONCE DAILY, Normal, Disp-90 Tablet, R-1 ergocalciferol (ERGOCALCIFEROL) 1,250 mcg (50,000 unit) capsule Take 1 Capsule by mouth every seven (7) days. , Normal, Disp-12 Capsule, R-0           Follow-up Information     Follow up With Specialties Details Why Contact Info    Jt Moran, DO Internal Medicine Physician Schedule an appointment as soon as possible for a visit  Call for a follow up appointment. [address] [facility]  Chelsie Amber Alonzo [address] 1999  [phone]          Return precautions given        Ana Hutchinson MD      Please note that this dictation was completed with what3words, the computer voice recognition software. Quite often unanticipated grammatical, syntax, homophones, and other interpretive errors are inadvertently transcribed by the computer software. Please disregard these errors. Please excuse any errors that have escaped final proofreading.     Procedures

## 2022-06-04 NOTE — Clinical Note
[facility]  [address]  [address]  [phone]    Work/School Note    Date: 6/4/2022    To Whom It May concern:    Awa Devi was seen and treated today in the emergency room by the following provider(s):  Attending Provider: Katerina Lyn MD.      Awa Devi is excused from work/school on 6/4/2022 through 6/6/2022. She is medically clear to return to work/school on 6/7/2022.          Sincerely,          Alma Rosa Day MD

## 2022-06-04 NOTE — ED NOTES
Pt updated on plan of care and results. Awaiting pharmacy to verify IV potassium.  Pt medicated with po potassium, crackers and ice water provided

## 2022-06-05 LAB
ATRIAL RATE: 64 BPM
CALCULATED P AXIS, ECG09: 45 DEGREES
CALCULATED R AXIS, ECG10: 17 DEGREES
CALCULATED T AXIS, ECG11: 12 DEGREES
DIAGNOSIS, 93000: NORMAL
P-R INTERVAL, ECG05: 160 MS
Q-T INTERVAL, ECG07: 414 MS
QRS DURATION, ECG06: 92 MS
QTC CALCULATION (BEZET), ECG08: 427 MS
VENTRICULAR RATE, ECG03: 64 BPM

## 2022-06-06 ENCOUNTER — HOSPITAL ENCOUNTER (EMERGENCY)
Age: 42
Discharge: HOME | End: 2022-06-06
Attending: EMERGENCY MEDICINE
Payer: MEDICAID

## 2022-06-06 VITALS
RESPIRATION RATE: 16 BRPM | TEMPERATURE: 97 F | DIASTOLIC BLOOD PRESSURE: 74 MMHG | OXYGEN SATURATION: 100 % | SYSTOLIC BLOOD PRESSURE: 120 MMHG | HEART RATE: 65 BPM

## 2022-06-06 DIAGNOSIS — R10.9 ABDOMINAL CRAMPING: Primary | ICD-10-CM

## 2022-06-06 DIAGNOSIS — R19.7 DIARRHEA, UNSPECIFIED TYPE: ICD-10-CM

## 2022-06-06 LAB
ALBUMIN SERPL-MCNC: 3.4 G/DL (ref 3.5–5)
ALBUMIN/GLOB SERPL: 0.8 {RATIO} (ref 1.1–2.2)
ALP SERPL-CCNC: 56 U/L (ref 45–117)
ALT SERPL-CCNC: 25 U/L (ref 12–78)
ANION GAP SERPL CALC-SCNC: 5 MMOL/L (ref 5–15)
AST SERPL-CCNC: 16 U/L (ref 15–37)
BASOPHILS # BLD: 0.1 K/UL (ref 0–0.1)
BASOPHILS NFR BLD: 1 % (ref 0–1)
BILIRUB SERPL-MCNC: 0.5 MG/DL (ref 0.2–1)
BUN SERPL-MCNC: 8 MG/DL (ref 6–20)
BUN/CREAT SERPL: 8 (ref 12–20)
CALCIUM SERPL-MCNC: 9.1 MG/DL (ref 8.5–10.1)
CHLORIDE SERPL-SCNC: 110 MMOL/L (ref 97–108)
CO2 SERPL-SCNC: 25 MMOL/L (ref 21–32)
CREAT SERPL-MCNC: 1.03 MG/DL (ref 0.55–1.02)
DIFFERENTIAL METHOD BLD: ABNORMAL
EOSINOPHIL # BLD: 0.1 K/UL (ref 0–0.4)
EOSINOPHIL NFR BLD: 2 % (ref 0–7)
ERYTHROCYTE [DISTWIDTH] IN BLOOD BY AUTOMATED COUNT: 12.9 % (ref 11.5–14.5)
GLOBULIN SER CALC-MCNC: 4.1 G/DL (ref 2–4)
GLUCOSE SERPL-MCNC: 92 MG/DL (ref 65–100)
HCT VFR BLD AUTO: 39.7 % (ref 35–47)
HGB BLD-MCNC: 12.6 G/DL (ref 11.5–16)
IMM GRANULOCYTES # BLD AUTO: 0 K/UL (ref 0–0.04)
IMM GRANULOCYTES NFR BLD AUTO: 0 % (ref 0–0.5)
LIPASE SERPL-CCNC: 274 U/L (ref 73–393)
LYMPHOCYTES # BLD: 1.5 K/UL (ref 0.8–3.5)
LYMPHOCYTES NFR BLD: 26 % (ref 12–49)
MCH RBC QN AUTO: 26.6 PG (ref 26–34)
MCHC RBC AUTO-ENTMCNC: 31.7 G/DL (ref 30–36.5)
MCV RBC AUTO: 83.8 FL (ref 80–99)
MONOCYTES # BLD: 0.7 K/UL (ref 0–1)
MONOCYTES NFR BLD: 11 % (ref 5–13)
NEUTS SEG # BLD: 3.5 K/UL (ref 1.8–8)
NEUTS SEG NFR BLD: 60 % (ref 32–75)
NRBC # BLD: 0 K/UL (ref 0–0.01)
NRBC BLD-RTO: 0 PER 100 WBC
PLATELET # BLD AUTO: 422 K/UL (ref 150–400)
PMV BLD AUTO: 9.9 FL (ref 8.9–12.9)
POTASSIUM SERPL-SCNC: 3.3 MMOL/L (ref 3.5–5.1)
PROT SERPL-MCNC: 7.5 G/DL (ref 6.4–8.2)
RBC # BLD AUTO: 4.74 M/UL (ref 3.8–5.2)
SODIUM SERPL-SCNC: 140 MMOL/L (ref 136–145)
WBC # BLD AUTO: 5.9 K/UL (ref 3.6–11)

## 2022-06-06 PROCEDURE — 74011250636 HC RX REV CODE- 250/636: Performed by: EMERGENCY MEDICINE

## 2022-06-06 PROCEDURE — 85025 COMPLETE CBC W/AUTO DIFF WBC: CPT

## 2022-06-06 PROCEDURE — 99284 EMERGENCY DEPT VISIT MOD MDM: CPT

## 2022-06-06 PROCEDURE — 83690 ASSAY OF LIPASE: CPT

## 2022-06-06 PROCEDURE — 80053 COMPREHEN METABOLIC PANEL: CPT

## 2022-06-06 PROCEDURE — 36415 COLL VENOUS BLD VENIPUNCTURE: CPT

## 2022-06-06 PROCEDURE — 74011250637 HC RX REV CODE- 250/637: Performed by: EMERGENCY MEDICINE

## 2022-06-06 PROCEDURE — 96372 THER/PROPH/DIAG INJ SC/IM: CPT

## 2022-06-06 RX ORDER — FAMOTIDINE 20 MG/1
20 TABLET, FILM COATED ORAL
Status: COMPLETED | OUTPATIENT
Start: 2022-06-06 | End: 2022-06-06

## 2022-06-06 RX ORDER — DICYCLOMINE HYDROCHLORIDE 10 MG/5ML
20 SOLUTION ORAL 4 TIMES DAILY
Qty: 200 ML | Refills: 0 | Status: SHIPPED | OUTPATIENT
Start: 2022-06-06 | End: 2022-06-11

## 2022-06-06 RX ORDER — OMEPRAZOLE 40 MG/1
40 CAPSULE, DELAYED RELEASE ORAL DAILY
Qty: 10 CAPSULE | Refills: 0 | Status: SHIPPED | OUTPATIENT
Start: 2022-06-06 | End: 2022-06-14 | Stop reason: SDUPTHER

## 2022-06-06 RX ORDER — LOPERAMIDE HYDROCHLORIDE 2 MG/1
2 CAPSULE ORAL
Status: COMPLETED | OUTPATIENT
Start: 2022-06-06 | End: 2022-06-06

## 2022-06-06 RX ORDER — DICYCLOMINE HYDROCHLORIDE 10 MG/ML
20 INJECTION INTRAMUSCULAR
Status: COMPLETED | OUTPATIENT
Start: 2022-06-06 | End: 2022-06-06

## 2022-06-06 RX ADMIN — FAMOTIDINE 20 MG: 20 TABLET, FILM COATED ORAL at 07:13

## 2022-06-06 RX ADMIN — SODIUM CHLORIDE 1000 ML: 9 INJECTION, SOLUTION INTRAVENOUS at 07:17

## 2022-06-06 RX ADMIN — DICYCLOMINE HYDROCHLORIDE 20 MG: 20 INJECTION, SOLUTION INTRAMUSCULAR at 07:18

## 2022-06-06 RX ADMIN — LOPERAMIDE HYDROCHLORIDE 2 MG: 2 CAPSULE ORAL at 07:14

## 2022-06-06 NOTE — ED PROVIDER NOTES
75-year-old female presents from home with a complaint of nausea and diarrhea. Symptoms started about a week ago. She is been having cramping abdominal pain with diarrhea. Frequency is increased from 4 times a day to 7 or 8 times a day. No blood in the stool. Episodes of diarrhea are preceded by abdominal cramping. She is had nausea but no vomiting. She reports some burning epigastric pain. No fevers at home that she knows of. Patient was seen another emergency department where she had blood work and IV fluids. She was discharged with a prescription for loperamide but she has not been able to obtain the medication yet. Past Medical History:   Diagnosis Date    Chronic pain of both knees 10/16/2020    Gastritis, Helicobacter pylori     Hypertension     Migraine without status migrainosus, not intractable, unspecified migraine type 2022    Obesity        Past Surgical History:   Procedure Laterality Date    HX ORTHOPAEDIC  2021    carpel tunnel surgery - L hand    HX TUBAL LIGATION      IR CHOLECYSTOSTOMY PERCUTANEOUS           Family History:   Problem Relation Age of Onset    Hypertension Mother     Hypertension Sister        Social History     Socioeconomic History    Marital status: SINGLE     Spouse name: Not on file    Number of children: Not on file    Years of education: Not on file    Highest education level: Not on file   Occupational History    Not on file   Tobacco Use    Smoking status: Former Smoker     Packs/day: 0.25     Quit date: 3/16/2018     Years since quittin.2    Smokeless tobacco: Never Used   Vaping Use    Vaping Use: Never used   Substance and Sexual Activity    Alcohol use: Yes     Comment: occ    Drug use: No    Sexual activity: Yes     Partners: Male     Birth control/protection: Surgical     Comment: Tubal ligation.    Other Topics Concern    Not on file   Social History Narrative    Not on file     Social Determinants of Health Financial Resource Strain:     Difficulty of Paying Living Expenses: Not on file   Food Insecurity:     Worried About Running Out of Food in the Last Year: Not on file    Kathryn of Food in the Last Year: Not on file   Transportation Needs:     Lack of Transportation (Medical): Not on file    Lack of Transportation (Non-Medical): Not on file   Physical Activity:     Days of Exercise per Week: Not on file    Minutes of Exercise per Session: Not on file   Stress:     Feeling of Stress : Not on file   Social Connections:     Frequency of Communication with Friends and Family: Not on file    Frequency of Social Gatherings with Friends and Family: Not on file    Attends Quaker Services: Not on file    Active Member of [address] [place] [address] [employer] or Organizations: Not on file    Attends Club or Organization Meetings: Not on file    Marital Status: Not on file   Intimate Partner Violence:     Fear of Current or Ex-Partner: Not on file    Emotionally Abused: Not on file    Physically Abused: Not on file    Sexually Abused: Not on file   Housing Stability:     Unable to Pay for Housing in the Last Year: Not on file    Number of [place] in the Last Year: Not on file    Unstable Housing in the Last Year: Not on file         ALLERGIES: Patient has no known allergies. Review of Systems   Constitutional: Negative for fever. HENT: Negative for facial swelling. Eyes: Negative for visual disturbance. Respiratory: Negative for chest tightness. Cardiovascular: Negative for chest pain. Gastrointestinal: Negative for abdominal pain. Genitourinary: Negative for difficulty urinating and dysuria. Musculoskeletal: Negative for arthralgias. Skin: Negative for rash. Neurological: Negative for headaches. Hematological: Negative for adenopathy. Psychiatric/Behavioral: Negative for suicidal ideas.        Vitals:    06/06/22 0553   BP: 123/66   Pulse: 70   Temp: 97 °F (36.1 °C)   SpO2: 100%            Physical Exam  Vitals and nursing note reviewed. Constitutional:       General: She is not in acute distress. Appearance: She is well-developed. HENT:      Head: Normocephalic and atraumatic. Eyes:      General: No scleral icterus. Conjunctiva/sclera: Conjunctivae normal.      Pupils: Pupils are equal, round, and reactive to light. Cardiovascular:      Rate and Rhythm: Normal rate. Heart sounds: No murmur heard. Pulmonary:      Effort: Pulmonary effort is normal. No respiratory distress. Abdominal:      General: There is no distension. Musculoskeletal:         General: Normal range of motion. Cervical back: Normal range of motion and neck supple. Skin:     General: Skin is warm and dry. Findings: No rash. Neurological:      Mental Status: She is alert and oriented to person, place, and time.           MDM  Number of Diagnoses or Management Options     Amount and/or Complexity of Data Reviewed  Clinical lab tests: reviewed           Procedures

## 2022-06-06 NOTE — ED TRIAGE NOTES
Patient arrived with a CC of abdominal pain that started a week ago. Patient stated having acid reflux. Patient states having diarrhea 6 x/day. Hx of H. Pylori twice in the past 10 years. No fever. Patient state feeling nauseous and weak.

## 2022-06-06 NOTE — Clinical Note
[address]  [address] [facility] [address]  [phone]    Work/School Note    Date: 6/6/2022    To Whom It May concern:      Vonnie Duggan was seen and treated today in the emergency room by the following provider(s):  No providers found. Vonnie Duggan is excused from work/school on 06/06/22. She is clear to return to work/school on 06/07/22.         Sincerely,          Flaquito Cadena, DO

## 2022-06-06 NOTE — Clinical Note
[address]  [address] [facility] [address]  [phone]    Work/School Note    Date: 6/6/2022    To Whom It May concern:    Marge Valentine was seen and treated today in the emergency room by the following provider(s):  No providers found. Marge Valentine is excused from work/school on 06/06/22 and 06/07/22. She is medically clear to return to work/school on 6/8/2022.        Sincerely,          Flaquito Cadena, DO

## 2022-06-06 NOTE — ED NOTES
Bedside and Verbal shift change report given to Charity Christianson (oncoming nurse) by Jordan Jimenez LPN (offgoing nurse). Report included the following information SBAR, ED Summary, MAR and Recent Results.

## 2022-06-07 ENCOUNTER — PATIENT MESSAGE (OUTPATIENT)
Dept: INTERNAL MEDICINE CLINIC | Age: 42
End: 2022-06-07

## 2022-06-07 ENCOUNTER — VIRTUAL VISIT (OUTPATIENT)
Dept: INTERNAL MEDICINE CLINIC | Age: 42
End: 2022-06-07
Payer: MEDICAID

## 2022-06-07 DIAGNOSIS — R19.5 LOOSE STOOLS: Primary | ICD-10-CM

## 2022-06-07 DIAGNOSIS — K30 INDIGESTION: ICD-10-CM

## 2022-06-07 DIAGNOSIS — R10.9 ABDOMINAL CRAMPING: ICD-10-CM

## 2022-06-07 PROCEDURE — 99442 PR PHYS/QHP TELEPHONE EVALUATION 11-20 MIN: CPT | Performed by: NURSE PRACTITIONER

## 2022-06-07 RX ORDER — CIPROFLOXACIN 250 MG/1
250 TABLET, FILM COATED ORAL 2 TIMES DAILY
Qty: 10 TABLET | Refills: 0 | Status: SHIPPED | OUTPATIENT
Start: 2022-06-07 | End: 2022-08-26 | Stop reason: SDUPTHER

## 2022-06-07 RX ORDER — METRONIDAZOLE 7.5 MG/G
1 GEL VAGINAL
Qty: 25 G | Refills: 0 | Status: SHIPPED | OUTPATIENT
Start: 2022-06-07 | End: 2022-06-12

## 2022-06-07 RX ORDER — FAMOTIDINE 20 MG/1
20 TABLET, FILM COATED ORAL
Qty: 10 TABLET | Refills: 0 | Status: SHIPPED | OUTPATIENT
Start: 2022-06-07 | End: 2022-06-14 | Stop reason: SDUPTHER

## 2022-06-07 NOTE — PROGRESS NOTES
Dulce Augustin is a 35 y.o. female, evaluated via audio-only technology on 6/7/2022 for Diarrhea, Abdominal Pain, and ED Follow-up  . Assessment & Plan:   Diagnoses and all orders for this visit:    1. Loose stools  -     CULTURE, STOOL  -     C. DIFFICILE AG & TOXIN A/B; Future  -     WBC, STOOL; Future  Encourage oral fluid intake, as tolerated. Spink diet as tolerated. 2. Abdominal cramping  May continue Bentyl as ordered by ER provider. If experiencing severe abdominal pain, blood in stool or other emergency, present to ER. 3. Indigestion  -     H. PYLORI ABS, IGG, IGA, IGM; Future  -     famotidine (PEPCID) 20 mg tablet; Take 1 Tablet by mouth nightly for 10 days. Note provided to return to work 06/13/22, per request.    Patient encouraged to call or return to office if symptoms do not improve or worsen. Reviewed medications and side effects in detail. Reviewed plan of care with patient who acknowledges understanding and agrees. 12  Subjective: This is a patient of Dr. Taz Wright who presents today with complaints of loose stools and abdominal pain. The patient states she has experienced frequent loose stools over the past 10 days, as frequent as one bowel movement every hour. No blood in stool. No nausea or vomiting. Appetite is decreased. The patient has had cramping lower abdominal pain prior to bowel movements as well as epigastric burning pain/ indigestion. No fever or chills. No known sick contacts. Patient cannot recall eating anything unusual prior to onset of symptoms, aside from a green smoothie. Patient was evaluated in the ER 6/4/22 and 6/6/22. She received IVF and potassium replacement. Prescribed Imodium and Bentyl are not helping much with symptoms. Patient mentions she was previously diagnosed and treated for H Pylori in 2018. She believes she experienced similar symptoms at that time.       Prior to Admission medications    Medication Sig Start Date End Date Taking? Authorizing Provider   dicyclomine (BENTYL) 10 mg/5 mL soln oral solution Take 10 mL by mouth four (4) times daily for 5 days. 6/6/22 6/11/22  Wilder Ramos MD   omeprazole (PRILOSEC) 40 mg capsule Take 1 Capsule by mouth daily for 10 days. 6/6/22 6/16/22  Wilder Ramos MD   topiramate (TOPAMAX PO) Take  by mouth. Yair, MD Stas   potassium chloride SR (Klor-Con 10) 10 mEq tablet Take 1 Tablet by mouth two (2) times a day for 5 days. 6/4/22 6/9/22  Cruzito Harris MD   loperamide (IMODIUM) 2 mg capsule Take 1 Capsule by mouth four (4) times daily as needed for Diarrhea for up to 10 days. 6/4/22 6/14/22  Cruzito Harris MD   amLODIPine (NORVASC) 10 mg tablet Take 1 tablet by mouth once daily 3/11/22   Lan Marvin DO   ergocalciferol (ERGOCALCIFEROL) 1,250 mcg (50,000 unit) capsule Take 1 Capsule by mouth every seven (7) days. 3/4/22   Lan Marvin DO   hydroCHLOROthiazide (HYDRODIURIL) 25 mg tablet TAKE 1 TABLET BY MOUTH ONCE DAILY 2/23/22   Lan Marvin DO     No Known Allergies  Past Medical History:   Diagnosis Date    Chronic pain of both knees 10/16/2020    Gastritis, Helicobacter pylori     Hypertension     Migraine without status migrainosus, not intractable, unspecified migraine type 2/23/2022    Obesity      Past Surgical History:   Procedure Laterality Date    HX ORTHOPAEDIC  06/21/2021    carpel tunnel surgery - L hand    HX TUBAL LIGATION  2000    IR CHOLECYSTOSTOMY PERCUTANEOUS         Review of Systems   Constitutional: Negative for chills and fever. HENT: Negative. Respiratory: Negative. Cardiovascular: Negative. Gastrointestinal: Positive for abdominal pain, diarrhea and heartburn. Negative for blood in stool, nausea and vomiting. Genitourinary: Negative. Musculoskeletal: Negative. Skin: Negative. Neurological: Negative. Endo/Heme/Allergies: Negative. Psychiatric/Behavioral: Negative.         No data recorded     Sherry Agosto was evaluated through a patient-initiated, synchronous (real-time) audio only encounter. She (or guardian if applicable) is aware that it is a billable service, which includes applicable co-pays, with coverage as determined by her insurance carrier. This visit was conducted with the patient's (and/or Jennifer Marx guardian's) verbal consent. She has not had a related appointment within my department in the past 7 days or scheduled within the next 24 hours. The patient was located in a state where the provider was licensed to provide care.   The patient was located at: Home: [address] [place] [id number]  The provider was located at: Home:     Total Time: minutes: 11-20 minutes    Haider Valdez NP

## 2022-06-07 NOTE — TELEPHONE ENCOUNTER
Returned call to patient. She states this was taken by urine sample. Will treat for E.coli UTI + bacterial vaginosis.

## 2022-06-07 NOTE — LETTER
NOTIFICATION RETURN TO WORK / SCHOOL    6/7/2022 10:01 AM    Ms. Kelby Nugent  [address] [place] [id number]      To Whom It May Concern:    Kelby Nugent is currently under the care of [address]. She will return to work/school on: 06/13/22. If there are questions or concerns please have the patient contact our office.         Sincerely,      Oral Cain NP

## 2022-06-07 NOTE — TELEPHONE ENCOUNTER
From: Roddy Wilburn  To:  Tennis Fuel   Sent: 6/7/2022 12:05 PM EDT  Subject: Bacteria     I just got these results from the urgent care I visited on the 27th this could explain the issues with my stomach Im not sure but Im sending it for my records, the clinic however did not give me a prescription because I dont live in [facility] can you please send in the necessary prescriptions for me I have a virtual office visit this morning

## 2022-06-08 RX ORDER — METRONIDAZOLE 500 MG/1
500 TABLET ORAL 2 TIMES DAILY
Qty: 14 TABLET | Refills: 0 | Status: SHIPPED | OUTPATIENT
Start: 2022-06-08 | End: 2022-06-15

## 2022-06-11 LAB — SPECIMEN STATUS REPORT, ROLRST: NORMAL

## 2022-06-13 ENCOUNTER — NURSE TRIAGE (OUTPATIENT)
Dept: OTHER | Facility: CLINIC | Age: 42
End: 2022-06-13

## 2022-06-13 ENCOUNTER — TELEPHONE (OUTPATIENT)
Dept: INTERNAL MEDICINE CLINIC | Age: 42
End: 2022-06-13

## 2022-06-13 DIAGNOSIS — R19.5 LOOSE STOOLS: Primary | ICD-10-CM

## 2022-06-13 LAB — WBC STL QL MICRO: ABNORMAL

## 2022-06-13 NOTE — TELEPHONE ENCOUNTER
Received call from Ladonna at [facility] with Red Flag Complaint. Subjective: Caller states \"Diarrhea several times a day\"   On abx  Has been seen in [facility] and ED for this. Current Symptoms: Diarrhea  Abd pain which has improved    Onset: 2 weeks ago    Temperature: Denies fever, chills and sweats     She has been seen in the ED 2 times in a 9 day period. She would like to schedule to see her PCP in person. No worsening of symptoms    Call transferred to Virginia Anderson at the [facility] for scheduling    Reason for Disposition  [facility] Caller has already spoken with another triager or PCP (or office), and has further questions and triager able to answer questions.     Protocols used: NO CONTACT OR DUPLICATE CONTACT CALL-ADULT-[place]

## 2022-06-13 NOTE — TELEPHONE ENCOUNTER
Lab geoff states they did not have correct order for stool sample. If the lab order is corrected today the sample is still good.  They cannot use after today  Phone# [phone]  l

## 2022-06-14 ENCOUNTER — VIRTUAL VISIT (OUTPATIENT)
Dept: INTERNAL MEDICINE CLINIC | Age: 42
End: 2022-06-14
Payer: MEDICAID

## 2022-06-14 DIAGNOSIS — R10.9 ABDOMINAL CRAMPING: ICD-10-CM

## 2022-06-14 DIAGNOSIS — R19.5 LOOSE STOOLS: Primary | ICD-10-CM

## 2022-06-14 DIAGNOSIS — K30 INDIGESTION: ICD-10-CM

## 2022-06-14 LAB — SPECIMEN STATUS REPORT, ROLRST: NORMAL

## 2022-06-14 PROCEDURE — 99213 OFFICE O/P EST LOW 20 MIN: CPT | Performed by: NURSE PRACTITIONER

## 2022-06-14 RX ORDER — OMEPRAZOLE 40 MG/1
40 CAPSULE, DELAYED RELEASE ORAL DAILY
Qty: 10 CAPSULE | Refills: 0 | Status: SHIPPED | OUTPATIENT
Start: 2022-06-14 | End: 2022-06-24

## 2022-06-14 RX ORDER — MONTELUKAST SODIUM 4 MG/1
1 TABLET, CHEWABLE ORAL 2 TIMES DAILY
Qty: 20 TABLET | Refills: 0 | Status: SHIPPED | OUTPATIENT
Start: 2022-06-14 | End: 2022-06-24

## 2022-06-14 RX ORDER — FAMOTIDINE 20 MG/1
20 TABLET, FILM COATED ORAL
Qty: 10 TABLET | Refills: 0 | Status: SHIPPED | OUTPATIENT
Start: 2022-06-14 | End: 2022-06-24

## 2022-06-14 NOTE — LETTER
NOTIFICATION RETURN TO WORK / SCHOOL    6/14/2022 12:56 PM    Ms. Starr Kamara  [address] [address]      To Whom It May Concern:    Starr Kamara is currently under the care of [address]. She will return to work/school on: 06/17/22    If there are questions or concerns please have the patient contact our office.         Sincerely,      Sabi Madrigal NP

## 2022-06-14 NOTE — PROGRESS NOTES
Anish Benson is a 35 y.o. female who was seen by synchronous (real-time) audio-video technology on 6/14/2022 for Diarrhea      Assessment & Plan:   Diagnoses and all orders for this visit:    1. Loose stools  Will order  -     colestipoL (COLESTID) 1 gram tablet; Take 1 Tablet by mouth two (2) times a day for 10 days. Carter diet as tolerated. Await results of C diff and stool culture. 2. Abdominal cramping  Will reorder,  -     H PYLORI ABS, IGA AND IGG; Future    3. Indigestion  Will refill:  -     famotidine (PEPCID) 20 mg tablet; Take 1 Tablet by mouth nightly for 10 days. -     omeprazole (PRILOSEC) 40 mg capsule; Take 1 Capsule by mouth daily for 10 days. Patient requests completion of forms for her employer. She has been out of work since 5/27/22 due to symptoms. She plans to return to work 6/17/22. Nursing notified for form completion. If symptoms do not improve, recommend follow-up with gastroenterology. If experiencing severe abdominal pain, blood in stool or other emergency, present to ER. Patient encouraged to call or return to office if symptoms do not improve or worsen. Reviewed medications and side effects in detail. Reviewed plan of care with patient who acknowledges understanding and agrees. Discussed above plan of care with Dr. Sarah Bolaños. Subjective: This is a patient of Dr. Sarah Bolaños who presents today for follow-up of loose stools. The patient had complaints of frequent loose stools at time of visit one week ago. \"No blood in stool. No nausea or vomiting. Appetite is decreased. The patient has had cramping lower abdominal pain prior to bowel movements as well as epigastric burning pain/ indigestion. No fever or chills. No known sick contacts. Patient cannot recall eating anything unusual prior to onset of symptoms, aside from a green smoothie. Patient was evaluated in the ER 6/4/22 and 6/6/22. She received IVF and potassium replacement.   Prescribed Imodium and Bentyl are not helping much with symptoms. \"    Following visit, patient provided a lab result from Urgent Care indicating E. Coli UTI and bacterial vaginosis. She has completed a course of Cipro and Vaginal Metronidazole. Patient provided stool sample to [facility] following last visit. Currently only result available WBC stool, is positive. Call placed to [facility] yesterday to confirm need for Stool Culture and C diff testing. Awaiting results. H Pylori screening was not completed. Patient states she had ongoing loose stools until last night. Last night (after taking 4 mg of Imodium yesterday) she was able to tolerate chicken and rice. She did not have any bowel movements overnight. This morning she felt bloated and after eating cereal had a small bowel movement that was less loose than previous. Abdominal cramping and burning resolved several days ago. Prior to Admission medications    Medication Sig Start Date End Date Taking? Authorizing Provider   topiramate (TOPAMAX PO) Take  by mouth. Yes Yair, MD Stas   loperamide (IMODIUM) 2 mg capsule Take 1 Capsule by mouth four (4) times daily as needed for Diarrhea for up to 10 days. 6/4/22 6/14/22 Yes Darrel Zavaleta MD   amLODIPine (NORVASC) 10 mg tablet Take 1 tablet by mouth once daily 3/11/22  Yes Agnes Martinez,    ergocalciferol (ERGOCALCIFEROL) 1,250 mcg (50,000 unit) capsule Take 1 Capsule by mouth every seven (7) days. 3/4/22  Yes Sal Parikh DO   hydroCHLOROthiazide (HYDRODIURIL) 25 mg tablet TAKE 1 TABLET BY MOUTH ONCE DAILY 2/23/22  Yes Sal Parikh DO   famotidine (PEPCID) 20 mg tablet Take 1 Tablet by mouth nightly for 10 days. 6/7/22 6/14/22  Darlin Max NP   omeprazole (PRILOSEC) 40 mg capsule Take 1 Capsule by mouth daily for 10 days.  6/6/22 6/14/22  Pato Henderson MD     No Known Allergies  Past Medical History:   Diagnosis Date    Chronic pain of both knees 10/16/2020    Gastritis, Helicobacter pylori     Hypertension     Migraine without status migrainosus, not intractable, unspecified migraine type 2/23/2022    Obesity      Past Surgical History:   Procedure Laterality Date    HX ORTHOPAEDIC  06/21/2021    carpel tunnel surgery - L hand    HX TUBAL LIGATION  2000    IR CHOLECYSTOSTOMY PERCUTANEOUS         Review of Systems   Constitutional: Negative for chills and fever. HENT: Negative. Respiratory: Negative. Cardiovascular: Negative. Gastrointestinal: Positive for diarrhea. Negative for abdominal pain, blood in stool, heartburn, nausea and vomiting. Genitourinary: Negative. Musculoskeletal: Negative. Skin: Negative. Neurological: Negative. Endo/Heme/Allergies: Negative. Psychiatric/Behavioral: Negative. Objective:     Patient-Reported Vitals 6/14/2022   Patient-Reported Weight 252   Patient-Reported Pulse 82   Patient-Reported Temperature 97.3   Patient-Reported Systolic  369   Patient-Reported Diastolic 81   Patient-Reported LMP 05/32/22        [INSTRUCTIONS:  \"[x]\" Indicates a positive item  \"[]\" Indicates a negative item  -- DELETE ALL ITEMS NOT EXAMINED]    Constitutional: [x] Appears well-developed and well-nourished [x] No apparent distress      [] Abnormal -     Mental status: [x] Alert and awake  [x] Oriented to person/place/time [x] Able to follow commands    [] Abnormal -     HENT: [x] Normocephalic, atraumatic  [] Abnormal -       Neurological:        [x] No Facial Asymmetry (Cranial nerve 7 motor function) (limited exam due to video visit)          [x] No gaze palsy        [] Abnormal -          Skin:        [x] No significant exanthematous lesions or discoloration noted on facial skin         [] Abnormal -            Psychiatric:       [x] Normal Affect [] Abnormal -        [x] No Hallucinations    Other pertinent observable physical exam findings:-        We discussed the expected course, resolution and complications of the diagnosis(es) in detail. Medication risks, benefits, costs, interactions, and alternatives were discussed as indicated. I advised her to contact the office if her condition worsens, changes or fails to improve as anticipated. She expressed understanding with the diagnosis(es) and plan. Monique Cazares, was evaluated through a synchronous (real-time) audio-video encounter. The patient (or guardian if applicable) is aware that this is a billable service, which includes applicable co-pays. This Virtual Visit was conducted with patient's (and/or legal guardian's) consent. The visit was conducted pursuant to the emergency declaration under the [address] [place] [address], [address] [place] [address] authority and the [employer] General Act. Patient identification was verified, and a caregiver was present when appropriate.   The patient was located at: Home: [address] [place] [id number]  The provider was located at: Home:         Anatoliy Mcneill NP

## 2022-06-16 LAB
C DIFF TOX GENS STL QL NAA+PROBE: NEGATIVE
H PYLORI IGA SER-ACNC: 14.4 UNITS (ref 0–8.9)
H PYLORI IGG SER IA-ACNC: 1.09 INDEX VALUE (ref 0–0.79)
SPECIMEN STATUS REPORT, ROLRST: NORMAL

## 2022-06-17 ENCOUNTER — PATIENT MESSAGE (OUTPATIENT)
Dept: INTERNAL MEDICINE CLINIC | Age: 42
End: 2022-06-17

## 2022-06-17 DIAGNOSIS — A04.8 H. PYLORI INFECTION: Primary | ICD-10-CM

## 2022-06-17 RX ORDER — AMOXICILLIN 500 MG/1
500 CAPSULE ORAL 2 TIMES DAILY
Qty: 14 CAPSULE | Refills: 0 | Status: SHIPPED | OUTPATIENT
Start: 2022-06-17 | End: 2022-06-27 | Stop reason: ALTCHOICE

## 2022-06-17 RX ORDER — METRONIDAZOLE 500 MG/1
500 TABLET ORAL 3 TIMES DAILY
Qty: 21 TABLET | Refills: 0 | Status: SHIPPED | OUTPATIENT
Start: 2022-06-17 | End: 2022-06-27 | Stop reason: ALTCHOICE

## 2022-06-17 NOTE — PROGRESS NOTES
Was emptying in boxes. Called and spoke to patient and relayed results. Sent in treatment. She did make an appointment to see GI, but nothing until 8/2.

## 2022-06-20 NOTE — TELEPHONE ENCOUNTER
----- Message from Claritza Fernandez LPN sent at 2/38/0090  2:01 PM EDT -----  Regarding: FW: Question regarding H PYLORI ABS, IGA AND IGG    ----- Message -----  From: Marge Valentine  Sent: 6/16/2022   1:28 PM EDT  To: Reena Schafer Int Nurse Pool  Subject: Question regarding H PYLORI ABS, IGA AND IGG     Sorry to keep bothering but all the Gastroenterologist are booked out until August is there some way I can get an referral or order to have an endoscopy done sooner, is it safe to potentially be carrying an infection around in my stomach for another month? Also I keep getting this pain in the left side my throat Ive gargled salt water to sooth it, that same pain was in the right side of my throat right before the diarrhea symptoms started May 27th when I went to the urgent care! could this be that the infection is restarting?

## 2022-06-20 NOTE — TELEPHONE ENCOUNTER
Call placed to patient. Per chart review, patient was started on Amoxicillin and Flagyl 06/17/22 by on call provider related to H Pylori results. Patient states she is now having more regular bowel movements. She does still have some pain in her throat intermittently. She has contacted her GI, Dr. Joe Guadalupe, and states his office has requested recent results. Spoke with representative at Dr. Frank Carolina office via telephone who requested results be faxed to [phone]. Patient scheduled for one week follow-up in office. Patient encouraged to call or return to office if symptoms do not improve or worsen.

## 2022-06-27 ENCOUNTER — OFFICE VISIT (OUTPATIENT)
Dept: INTERNAL MEDICINE CLINIC | Age: 42
End: 2022-06-27
Payer: MEDICAID

## 2022-06-27 VITALS
SYSTOLIC BLOOD PRESSURE: 122 MMHG | OXYGEN SATURATION: 99 % | BODY MASS INDEX: 35.5 KG/M2 | HEIGHT: 71 IN | RESPIRATION RATE: 16 BRPM | WEIGHT: 253.6 LBS | HEART RATE: 86 BPM | DIASTOLIC BLOOD PRESSURE: 86 MMHG | TEMPERATURE: 98 F

## 2022-06-27 DIAGNOSIS — N60.09 CYST OF BREAST, UNSPECIFIED LATERALITY: ICD-10-CM

## 2022-06-27 DIAGNOSIS — I10 ESSENTIAL HYPERTENSION: ICD-10-CM

## 2022-06-27 DIAGNOSIS — J30.1 SEASONAL ALLERGIC RHINITIS DUE TO POLLEN: ICD-10-CM

## 2022-06-27 DIAGNOSIS — R19.5 LOOSE STOOLS: ICD-10-CM

## 2022-06-27 DIAGNOSIS — R19.7 DIARRHEA, UNSPECIFIED TYPE: Primary | ICD-10-CM

## 2022-06-27 DIAGNOSIS — E66.9 OBESITY (BMI 35.0-39.9 WITHOUT COMORBIDITY): ICD-10-CM

## 2022-06-27 PROCEDURE — 99214 OFFICE O/P EST MOD 30 MIN: CPT | Performed by: INTERNAL MEDICINE

## 2022-06-27 RX ORDER — FLUTICASONE PROPIONATE 50 MCG
2 SPRAY, SUSPENSION (ML) NASAL DAILY
Qty: 1 EACH | Refills: 1 | Status: SHIPPED | OUTPATIENT
Start: 2022-06-27

## 2022-06-27 NOTE — PROGRESS NOTES
HISTORY OF PRESENT ILLNESS  Kerry Campbell is a 35 y.o. female here with a complaining about frequent loose stool for last several months. Had a lot of blood work done, C. difficile came back negative. She was sent to GI specialist.  Lab work ordered including celiac panel. Patient is not able to do lab work because she will go to [facility].  Would like to get another prescription for [facility].  Stool culture was already sent by GI specialist.  She needs to follow-up. Has hypertension, compliant on medication. Denies chest pain palpitation or shortness of breath. She is obese, watching diet and exercise. Taking Topamax. Losing weight slowly. Suffer from allergy, taking Flonase, need refill. Has very low vitamin D, taking high-dose of vitamin D. All labs reviewed. HPI    Review of Systems   Constitutional: Negative. HENT: Negative. Eyes: Negative. Respiratory: Negative. Cardiovascular: Negative. Gastrointestinal: Positive for abdominal pain and diarrhea. Genitourinary: Negative. Musculoskeletal: Negative. Skin: Negative. Neurological: Negative. Endo/Heme/Allergies: Negative. Psychiatric/Behavioral: Negative. Physical Exam  Constitutional:       Appearance: Normal appearance. She is obese. Cardiovascular:      Rate and Rhythm: Normal rate and regular rhythm. Pulses: Normal pulses. Heart sounds: Normal heart sounds. Pulmonary:      Effort: Pulmonary effort is normal.      Breath sounds: Normal breath sounds. Abdominal:      General: Abdomen is flat. Bowel sounds are normal.      Palpations: Abdomen is soft. Musculoskeletal:         General: Normal range of motion. Cervical back: Normal range of motion and neck supple. Neurological:      Mental Status: She is alert. Psychiatric:         Mood and Affect: Mood normal.         Behavior: Behavior normal.         Thought Content:  Thought content normal.         ASSESSMENT and PLAN  Diagnoses and all orders for this visit:    1. Diarrhea, unspecified type    2. Loose stools    She has been suffering from frequent loose stool for last several months. C. difficile was negative. She had seen GI doctor, celiac panel ordered but [address] [place] [address] is not doing it because patient need to pay the previous balance. She would like to get another prescription for [facility] lab. We will give,  -     CELIAC ANTIBODY PROFILE  -     CBC WITH AUTOMATED DIFF  -     METABOLIC PANEL, COMPREHENSIVE  Advised to take 1 tablespoon of take yogurt mixed with Metamucil and take every day. 3. Essential hypertension  Stable blood pressure. On amlodipine. 4. Obesity (BMI 35.0-39.9 without comorbidity)  Addressed weight, diet and exercise with patient. Decrease carbohydrates (white foods, sweet foods, sweet drinks and alcohol), increase green leafy vegetables and protein (lean meats and beans) with each meal. Avoid fried foods. Eat 3-5 small meals daily. Do not skip meals. Increase water intake. Increase physical activity to 30 minutes daily for health benefit or 60 minutes daily to prevent weight regain, as tolerated. Get 7-8 hours uninterrupted sleep nightly. 5. Cyst of breast, unspecified laterality  Stable. She is planning to get breast implant. Advised her to talk to her plastic surgeon about it. 6. Seasonal allergic rhinitis due to pollen    Will refill,  -     fluticasone propionate (FLONASE) 50 mcg/actuation nasal spray; 2 Sprays by Both Nostrils route daily. Discussed expected course/resolution/complications of diagnosis in detail with patient. Medication risks/benefits/costs/interactions/alternatives discussed with patient. Discussed COVID-19 infection precaution with patient. Pt was given an after visit summary which includes diagnoses, current medications & vitals. Pt expressed understanding with the diagnosis and plan.

## 2022-06-27 NOTE — PROGRESS NOTES
1. \"Have you been to the ER, urgent care clinic since your last visit? Hospitalized since your last visit? \" No    2. \"Have you seen or consulted any other health care providers outside of the [address] [place] [address] since your last visit? \" No     3. For patients aged 39-70: Has the patient had a colonoscopy / FIT/ Cologuard? NA - based on age      If the patient is female:    4. For patients aged 41-77: Has the patient had a mammogram within the past 2 years? Yes - no Care Gap present      5. For patients aged 21-65: Has the patient had a pap smear?  Yes - no Care Gap present

## 2022-06-29 ENCOUNTER — PATIENT MESSAGE (OUTPATIENT)
Dept: INTERNAL MEDICINE CLINIC | Age: 42
End: 2022-06-29

## 2022-06-29 NOTE — TELEPHONE ENCOUNTER
From: Dory Mccarthy  To: Queen Silviano MD  Sent: 6/29/2022 3:15 PM EDT  Subject: Medical Clearance Form for Surgery    Dr Panchito Sparks, Can you please complete this clearance form for my planned surgery on the 10th of August. please send   Form to   Email: [email or web address]   [phone]

## 2022-06-29 NOTE — LETTER
6/29/2022 4:03 PM    [address] [facility] [id number]      Current Outpatient Medications   Medication Sig    fluticasone propionate (FLONASE) 50 mcg/actuation nasal spray 2 Sprays by Both Nostrils route daily.  topiramate (TOPAMAX PO) Take  by mouth.  amLODIPine (NORVASC) 10 mg tablet Take 1 tablet by mouth once daily    ergocalciferol (ERGOCALCIFEROL) 1,250 mcg (50,000 unit) capsule Take 1 Capsule by mouth every seven (7) days.  hydroCHLOROthiazide (HYDRODIURIL) 25 mg tablet TAKE 1 TABLET BY MOUTH ONCE DAILY     No current facility-administered medications for this visit.            Sincerely,      Gavin George MD

## 2022-08-02 ENCOUNTER — OFFICE VISIT (OUTPATIENT)
Dept: INTERNAL MEDICINE CLINIC | Age: 42
End: 2022-08-02
Payer: MEDICAID

## 2022-08-02 VITALS
DIASTOLIC BLOOD PRESSURE: 68 MMHG | BODY MASS INDEX: 36.4 KG/M2 | SYSTOLIC BLOOD PRESSURE: 126 MMHG | RESPIRATION RATE: 16 BRPM | WEIGHT: 260 LBS | HEART RATE: 81 BPM | TEMPERATURE: 98 F | OXYGEN SATURATION: 99 % | HEIGHT: 71 IN

## 2022-08-02 DIAGNOSIS — Z98.82 S/P BREAST AUGMENTATION: ICD-10-CM

## 2022-08-02 DIAGNOSIS — E55.9 VITAMIN D DEFICIENCY: ICD-10-CM

## 2022-08-02 DIAGNOSIS — I10 ESSENTIAL HYPERTENSION: Primary | ICD-10-CM

## 2022-08-02 DIAGNOSIS — R76.8 HELICOBACTER PYLORI AB+: ICD-10-CM

## 2022-08-02 DIAGNOSIS — E66.9 OBESITY (BMI 35.0-39.9 WITHOUT COMORBIDITY): ICD-10-CM

## 2022-08-02 DIAGNOSIS — G43.909 MIGRAINE WITHOUT STATUS MIGRAINOSUS, NOT INTRACTABLE, UNSPECIFIED MIGRAINE TYPE: ICD-10-CM

## 2022-08-02 PROCEDURE — 99214 OFFICE O/P EST MOD 30 MIN: CPT | Performed by: INTERNAL MEDICINE

## 2022-08-02 RX ORDER — TOPIRAMATE 50 MG/1
50 TABLET, FILM COATED ORAL 2 TIMES DAILY
Qty: 60 TABLET | Refills: 3 | Status: SHIPPED | OUTPATIENT
Start: 2022-08-02

## 2022-08-02 NOTE — PROGRESS NOTES
Health Maintenance Due   Topic Date Due    DTaP/Tdap/Td series (1 - Tdap) Never done    COVID-19 Vaccine (3 - Booster for Pfizer series) 09/26/2021       Chief Complaint   Patient presents with    Follow Up Chronic Condition       1. Have you been to the ER, urgent care clinic since your last visit? Hospitalized since your last visit? No    2. Have you seen or consulted any other health care providers outside of the [address] [place] [address] since your last visit? Include any pap smears or colon screening. No    3) Do you have an Advance Directive on file? no    4) Are you interested in receiving information on Advance Directives? NO      Patient is accompanied by self I have received verbal consent from Asim Gaspar to discuss any/all medical information while they are present in the room.

## 2022-08-02 NOTE — PROGRESS NOTES
HISTORY OF PRESENT ILLNESS  Alma Delia Packer is a 35 y.o. female. Pt. comes in for f/u. Has a few chronic medical issues as documented including HTN, obesity, migraines. Vital signs are stable. BMI is 36.3. She just came back from [place] where she had a tummy tuck and breast augmentation. Still has abdominal drains in place. Denies any pain. Has occasional headaches. Topamax helps. It also has helped lower her appetite for weight control. Has chronic arthritic pains especially knees. Chronic GI issues have been stable. History of H. pylori with previous treatment. Followed by GI. Scheduled for EGD soon. All other chronic medical issues are stable on current treatment regimen. Has had Covid-19 vaccination. Reports taking proper precautions. Denies any related signs or symptoms. PMH/PSH/Allergies/Social History/medication list and most recent studies reviewed with patient. Tobacco use: No  Alcohol use: Social  Reports compliance with medications and diet. Trying to be active physically to control weight. Reports no other new c/o. HPI    Review of Systems   Constitutional: Negative. HENT: Negative. Eyes: Negative. Respiratory:  Negative for shortness of breath. Cardiovascular:  Negative for chest pain and leg swelling. Gastrointestinal: Negative. Genitourinary:  Negative for dysuria. Musculoskeletal:  Positive for joint pain. Negative for back pain and falls. Skin: Negative. Neurological:  Positive for headaches. Negative for dizziness, speech change and weakness. Endo/Heme/Allergies: Negative. Psychiatric/Behavioral:  Negative for depression. The patient has insomnia. The patient is not nervous/anxious. All other systems reviewed and are negative. Physical Exam  Vitals and nursing note reviewed. Constitutional:       General: She is not in acute distress. Appearance: She is well-developed.       Comments: Pleasant lady  Obese   HENT:      Head: Normocephalic and atraumatic. Nose: Nose normal.      Mouth/Throat:      Mouth: Mucous membranes are moist.   Eyes:      General: No scleral icterus. Conjunctiva/sclera: Conjunctivae normal.   Neck:      Vascular: No carotid bruit. Comments: Left neck/supraclavicular mass/lipoma  Cardiovascular:      Rate and Rhythm: Normal rate and regular rhythm. Heart sounds: Normal heart sounds. No murmur heard. Pulmonary:      Effort: Pulmonary effort is normal. No respiratory distress. Breath sounds: Normal breath sounds. No wheezing or rales. Abdominal:      General: Bowel sounds are normal. There is no distension. Palpations: Abdomen is soft. Tenderness: There is no abdominal tenderness. There is no left CVA tenderness. Comments: Surgical scars seem to be healing well. Bilateral drains with minimal serosanguineous fluid in the backs. Musculoskeletal:         General: No tenderness. Normal range of motion. Cervical back: Normal range of motion and neck supple. Right lower leg: No edema. Left lower leg: No edema. Skin:     General: Skin is warm and dry. Findings: No rash. Neurological:      General: No focal deficit present. Mental Status: She is alert and oriented to person, place, and time. Motor: No weakness. Coordination: Coordination normal.      Gait: Gait normal.   Psychiatric:         Behavior: Behavior normal.       ASSESSMENT and PLAN  Diagnoses and all orders for this visit:    1. Essential hypertension  Monitor BP at home with goal of 140/90 or less   Stable chronic condition. Continue current treatment/medications. Discussed do's and don't's of HTN  2. Obesity (BMI 35.0-39.9 without comorbidity)  Advised patient to lose weight by watching diet (decreasing sugars/carbs/fat, increasing fruits/vegetables), exercising at least 30 minutes daily, getting 7-8 hours of sleep daily, drinking plenty of water, and decreasing stress  3.  S/P breast augmentation  Scar tissue looks stable  4. Vitamin D deficiency  Continue supplements  5. Helicobacter pylori ab+  Follow-up with GI as scheduled  6. Chronic headaches  -     topiramate (Topamax) 50 mg tablet; Take 1 Tablet by mouth two (2) times a day. Follow-up and Dispositions    Return in about 4 months (around 12/2/2022). All chronic medical problems are stable  Continue with current medical management and plan  lab results and schedule of future lab studies reviewed with patient  reviewed diet, exercise and weight control  reviewed medications and side effects in detail  F/u with other MD's/ providers as scheduled  COVID-19 precautions discussed with pt  An After Visit Summary was printed and given to the patient.

## 2022-08-26 DIAGNOSIS — R35.0 URINARY FREQUENCY: Primary | ICD-10-CM

## 2022-08-26 DIAGNOSIS — R30.9 URINARY PAIN: ICD-10-CM

## 2022-08-26 RX ORDER — CIPROFLOXACIN 250 MG/1
250 TABLET, FILM COATED ORAL 2 TIMES DAILY
Qty: 10 TABLET | Refills: 0 | Status: SHIPPED | OUTPATIENT
Start: 2022-08-26 | End: 2022-08-31

## 2022-08-26 NOTE — TELEPHONE ENCOUNTER
Patient believes she has a reoccurrence of UTI, same symptoms of urinary burning and frequency. Apt needed? Requested Prescriptions     Pending Prescriptions Disp Refills    ciprofloxacin HCl (CIPRO) 250 mg tablet 10 Tablet 0     Sig: Take 1 Tablet by mouth two (2) times a day for 5 days.          8/2/2022 is LAST OFFICE VISIT     Future Appointments   Date Time Provider Elsa Lo   12/16/2022 10:45 AM DO THADDEUS Camacho BS [facility]

## 2022-10-11 NOTE — ADDENDUM NOTE
Addended by: Jamal Mejia on: 4/17/2018 05:00 PM     Modules accepted: Orders
Addended by: Lani Madrid on: 4/23/2018 09:51 PM     Modules accepted: Orders
Listen to music

## 2022-12-13 ENCOUNTER — DOCUMENTATION ONLY (OUTPATIENT)
Dept: INTERNAL MEDICINE CLINIC | Age: 42
End: 2022-12-13

## 2022-12-13 ENCOUNTER — NURSE TRIAGE (OUTPATIENT)
Dept: OTHER | Facility: CLINIC | Age: 42
End: 2022-12-13

## 2022-12-13 NOTE — PROGRESS NOTES
Pt wanting appt for a rash that is spreading. No open appointments. Pt advised [facility] to [facility].  Pt voiced understanding and took address

## 2022-12-14 ENCOUNTER — OFFICE VISIT (OUTPATIENT)
Dept: URGENT CARE | Age: 42
End: 2022-12-14
Payer: MEDICAID

## 2022-12-14 VITALS
SYSTOLIC BLOOD PRESSURE: 145 MMHG | HEART RATE: 78 BPM | OXYGEN SATURATION: 100 % | RESPIRATION RATE: 16 BRPM | TEMPERATURE: 97.5 F | BODY MASS INDEX: 34.59 KG/M2 | DIASTOLIC BLOOD PRESSURE: 88 MMHG | WEIGHT: 248 LBS

## 2022-12-14 DIAGNOSIS — B36.0 TINEA VERSICOLOR: Primary | ICD-10-CM

## 2022-12-14 DIAGNOSIS — N89.8 VAGINAL DISCHARGE: ICD-10-CM

## 2022-12-14 LAB
BILIRUB UR QL STRIP: NEGATIVE
GLUCOSE UR-MCNC: NEGATIVE MG/DL
KETONES P FAST UR STRIP-MCNC: NEGATIVE MG/DL
PH UR STRIP: 7 [PH] (ref 4.6–8)
PROT UR QL STRIP: NEGATIVE
SP GR UR STRIP: 1.03 (ref 1–1.03)
UA UROBILINOGEN AMB POC: NORMAL (ref 0.2–1)
URINALYSIS CLARITY POC: NORMAL
URINALYSIS COLOR POC: NORMAL
URINE BLOOD POC: NEGATIVE
URINE LEUKOCYTES POC: NEGATIVE
URINE NITRITES POC: NEGATIVE

## 2022-12-14 PROCEDURE — 3074F SYST BP LT 130 MM HG: CPT | Performed by: NURSE PRACTITIONER

## 2022-12-14 PROCEDURE — 3078F DIAST BP <80 MM HG: CPT | Performed by: NURSE PRACTITIONER

## 2022-12-14 PROCEDURE — 99203 OFFICE O/P NEW LOW 30 MIN: CPT | Performed by: NURSE PRACTITIONER

## 2022-12-14 PROCEDURE — 81003 URINALYSIS AUTO W/O SCOPE: CPT | Performed by: NURSE PRACTITIONER

## 2022-12-14 RX ORDER — METRONIDAZOLE 500 MG/1
500 TABLET ORAL 2 TIMES DAILY
Qty: 14 TABLET | Refills: 0 | Status: SHIPPED | OUTPATIENT
Start: 2022-12-14 | End: 2022-12-21

## 2022-12-14 NOTE — PROGRESS NOTES
Other  Pertinent negatives include no abdominal pain. Vaginal Discharge   Pertinent negatives include no fever, no abdominal pain, no nausea, no vomiting, no dysuria and no frequency. Pt presents with complaint of rash to breasts for few weeks. Noticed dark spots. Not painful or itchy. Recent breast augmentation surgery. No treatment to date. She also c/o vaginal discharge, white, with odor. Concerned about STI. Had intercourse 3-4 days ago and condom broke. Pt has hx BV. Past Medical History:   Diagnosis Date    Chronic pain of both knees 10/16/2020    Gastritis, Helicobacter pylori     Hypertension     Migraine without status migrainosus, not intractable, unspecified migraine type 2022    Obesity         Past Surgical History:   Procedure Laterality Date    HX ORTHOPAEDIC  2021    carpel tunnel surgery - L hand    HX TUBAL LIGATION      IR CHOLECYSTOSTOMY PERCUTANEOUS           Family History   Problem Relation Age of Onset    Hypertension Mother     Hypertension Sister         Social History     Socioeconomic History    Marital status: SINGLE     Spouse name: Not on file    Number of children: Not on file    Years of education: Not on file    Highest education level: Not on file   Occupational History    Not on file   Tobacco Use    Smoking status: Former     Packs/day: 0.25     Types: Cigarettes     Quit date: 3/16/2018     Years since quittin.7    Smokeless tobacco: Never   Vaping Use    Vaping Use: Never used   Substance and Sexual Activity    Alcohol use: Yes     Comment: occ    Drug use: No    Sexual activity: Yes     Partners: Male     Birth control/protection: Surgical     Comment: Tubal ligation.    Other Topics Concern    Not on file   Social History Narrative    Not on file     Social Determinants of Health     Financial Resource Strain: Low Risk     Difficulty of Paying Living Expenses: Not hard at all   Food Insecurity: No Food Insecurity    Worried About Running Out of Food in the Last Year: Never true    Ran Out of Food in the Last Year: Never true   Transportation Needs: Not on file   Physical Activity: Not on file   Stress: Not on file   Social Connections: Not on file   Intimate Partner Violence: Not on file   Housing Stability: Not on file                ALLERGIES: Patient has no known allergies. Review of Systems   Constitutional:  Negative for chills and fever. Gastrointestinal:  Negative for abdominal pain, nausea and vomiting. Genitourinary:  Positive for vaginal discharge. Negative for dysuria, frequency, urgency, vaginal bleeding and vaginal pain. Musculoskeletal:  Negative for back pain. Vitals:    12/14/22 1442   BP: (!) 145/88   Pulse: 78   Resp: 16   Temp: 97.5 °F (36.4 °C)   SpO2: 100%   Weight: 248 lb (112.5 kg)       Physical Exam  Constitutional:       General: She is not in acute distress. Appearance: Normal appearance. She is not ill-appearing or toxic-appearing. HENT:      Head: Normocephalic and atraumatic. Abdominal:      General: Abdomen is flat. Bowel sounds are normal.      Palpations: Abdomen is soft. Tenderness: There is no abdominal tenderness. There is no guarding or rebound. Genitourinary:     Comments: Exam deferred  Skin:     Findings: Rash present. Comments: Scattered hyperpigmented macular lesions to upper breasts and right upper back   Neurological:      Mental Status: She is alert.      Results for orders placed or performed in visit on 12/14/22   AMB POC URINALYSIS DIP STICK AUTO W/O MICRO   Result Value Ref Range    Color (UA POC)      Clarity (UA POC)      Glucose (UA POC) Negative Negative    Bilirubin (UA POC) Negative Negative    Ketones (UA POC) Negative Negative    Specific gravity (UA POC) 1.030 1.001 - 1.035    Blood (UA POC) Negative Negative    pH (UA POC) 7 4.6 - 8.0    Protein (UA POC) Negative Negative    Urobilinogen (UA POC) 0.2 mg/dL 0.2 - 1    Nitrites (UA POC) Negative Negative Leukocyte esterase (UA POC) Negative Negative       ICD-10-CM ICD-9-CM   1. Tinea versicolor  B36.0 111.0   2. Vaginal discharge  N89.8 623.5       Orders Placed This Encounter    NUSWAB VAGINITIS PLUS     Standing Status:   Future     Number of Occurrences:   1     Standing Expiration Date:   12/14/2023    AMB POC URINALYSIS DIP STICK AUTO W/O MICRO    metroNIDAZOLE (FLAGYL) 500 mg tablet     Sig: Take 1 Tablet by mouth two (2) times a day for 7 days. Dispense:  14 Tablet     Refill:  0      Recommend selsun blue, wash once daily for 2-3 weeks. Advised STI testing may be too early to detect but will check. For now, start flagyl for presumed BV. The patient is to follow up with PCP. If signs and symptoms become worse the pt is to go to the ER.      Juan Rawls NP       MDM    Procedures

## 2023-05-19 DIAGNOSIS — I10 ESSENTIAL HYPERTENSION: Primary | ICD-10-CM

## 2023-05-19 RX ORDER — AMLODIPINE BESYLATE 10 MG/1
10 TABLET ORAL DAILY
Qty: 30 TABLET | Refills: 0 | Status: SHIPPED | OUTPATIENT
Start: 2023-05-19

## 2023-07-10 ENCOUNTER — OFFICE VISIT (OUTPATIENT)
Age: 43
End: 2023-07-10
Payer: MEDICAID

## 2023-07-10 VITALS
HEIGHT: 71 IN | DIASTOLIC BLOOD PRESSURE: 74 MMHG | OXYGEN SATURATION: 99 % | BODY MASS INDEX: 36.26 KG/M2 | SYSTOLIC BLOOD PRESSURE: 124 MMHG | RESPIRATION RATE: 16 BRPM | HEART RATE: 75 BPM | WEIGHT: 259 LBS | TEMPERATURE: 98 F

## 2023-07-10 VITALS — SYSTOLIC BLOOD PRESSURE: 114 MMHG | BODY MASS INDEX: 36.12 KG/M2 | WEIGHT: 259 LBS | DIASTOLIC BLOOD PRESSURE: 78 MMHG

## 2023-07-10 DIAGNOSIS — D25.9 UTERINE LEIOMYOMA, UNSPECIFIED LOCATION: ICD-10-CM

## 2023-07-10 DIAGNOSIS — I10 ESSENTIAL HYPERTENSION: Primary | ICD-10-CM

## 2023-07-10 DIAGNOSIS — D21.9 FIBROIDS: ICD-10-CM

## 2023-07-10 DIAGNOSIS — N94.6 DYSMENORRHEA: ICD-10-CM

## 2023-07-10 DIAGNOSIS — G43.009 MIGRAINE WITHOUT AURA AND WITHOUT STATUS MIGRAINOSUS, NOT INTRACTABLE: ICD-10-CM

## 2023-07-10 DIAGNOSIS — N94.10 DYSPAREUNIA IN FEMALE: ICD-10-CM

## 2023-07-10 DIAGNOSIS — N93.9 ABNORMAL UTERINE BLEEDING (AUB): Primary | ICD-10-CM

## 2023-07-10 PROCEDURE — 3078F DIAST BP <80 MM HG: CPT | Performed by: OBSTETRICS & GYNECOLOGY

## 2023-07-10 PROCEDURE — 99213 OFFICE O/P EST LOW 20 MIN: CPT | Performed by: INTERNAL MEDICINE

## 2023-07-10 PROCEDURE — 3074F SYST BP LT 130 MM HG: CPT | Performed by: OBSTETRICS & GYNECOLOGY

## 2023-07-10 PROCEDURE — 3078F DIAST BP <80 MM HG: CPT | Performed by: INTERNAL MEDICINE

## 2023-07-10 PROCEDURE — 99214 OFFICE O/P EST MOD 30 MIN: CPT | Performed by: OBSTETRICS & GYNECOLOGY

## 2023-07-10 PROCEDURE — 3074F SYST BP LT 130 MM HG: CPT | Performed by: INTERNAL MEDICINE

## 2023-07-10 RX ORDER — HYDROCHLOROTHIAZIDE 25 MG/1
25 TABLET ORAL DAILY
Qty: 90 TABLET | Refills: 1 | Status: SHIPPED | OUTPATIENT
Start: 2023-07-10

## 2023-07-10 RX ORDER — AMLODIPINE BESYLATE 10 MG/1
10 TABLET ORAL DAILY
Qty: 90 TABLET | Refills: 1 | Status: SHIPPED | OUTPATIENT
Start: 2023-07-10

## 2023-07-10 RX ORDER — TOPIRAMATE 50 MG/1
50 TABLET, FILM COATED ORAL 2 TIMES DAILY
Qty: 60 TABLET | Refills: 1 | Status: SHIPPED | OUTPATIENT
Start: 2023-07-10

## 2023-07-10 SDOH — ECONOMIC STABILITY: FOOD INSECURITY: WITHIN THE PAST 12 MONTHS, THE FOOD YOU BOUGHT JUST DIDN'T LAST AND YOU DIDN'T HAVE MONEY TO GET MORE.: NEVER TRUE

## 2023-07-10 SDOH — ECONOMIC STABILITY: FOOD INSECURITY: WITHIN THE PAST 12 MONTHS, YOU WORRIED THAT YOUR FOOD WOULD RUN OUT BEFORE YOU GOT MONEY TO BUY MORE.: NEVER TRUE

## 2023-07-10 SDOH — ECONOMIC STABILITY: INCOME INSECURITY: HOW HARD IS IT FOR YOU TO PAY FOR THE VERY BASICS LIKE FOOD, HOUSING, MEDICAL CARE, AND HEATING?: NOT HARD AT ALL

## 2023-07-10 ASSESSMENT — ANXIETY QUESTIONNAIRES
5. BEING SO RESTLESS THAT IT IS HARD TO SIT STILL: 0
IF YOU CHECKED OFF ANY PROBLEMS ON THIS QUESTIONNAIRE, HOW DIFFICULT HAVE THESE PROBLEMS MADE IT FOR YOU TO DO YOUR WORK, TAKE CARE OF THINGS AT HOME, OR GET ALONG WITH OTHER PEOPLE: NOT DIFFICULT AT ALL
4. TROUBLE RELAXING: 0
3. WORRYING TOO MUCH ABOUT DIFFERENT THINGS: 0
1. FEELING NERVOUS, ANXIOUS, OR ON EDGE: 0
6. BECOMING EASILY ANNOYED OR IRRITABLE: 0
7. FEELING AFRAID AS IF SOMETHING AWFUL MIGHT HAPPEN: 0
GAD7 TOTAL SCORE: 0
2. NOT BEING ABLE TO STOP OR CONTROL WORRYING: 0

## 2023-07-10 ASSESSMENT — PATIENT HEALTH QUESTIONNAIRE - PHQ9
SUM OF ALL RESPONSES TO PHQ9 QUESTIONS 1 & 2: 0
SUM OF ALL RESPONSES TO PHQ QUESTIONS 1-9: 0
2. FEELING DOWN, DEPRESSED OR HOPELESS: 0
SUM OF ALL RESPONSES TO PHQ QUESTIONS 1-9: 0
1. LITTLE INTEREST OR PLEASURE IN DOING THINGS: 0

## 2023-07-10 ASSESSMENT — ENCOUNTER SYMPTOMS
GASTROINTESTINAL NEGATIVE: 1
RESPIRATORY NEGATIVE: 1

## 2023-07-10 NOTE — PROGRESS NOTES
Problem Visit    Chief Complaint:   Chief Complaint   Patient presents with    New Patient   AUB  Dysmenorrhea  Dyspareunia      HPI:    Nellie Duke is a 38 y.o. Claudia November / [facility] [place] female with LMP of Patient's last menstrual period was 06/24/2023. who complains of fibroids, heavy menstrual bleeding, irregular periods . She developed this problem approximately 4 months ago. Pt states she saw Gynecologist  in [place], [place] for symptoms and was diagnosed with fibroids. Pt states 6 fibroids were found. Pt states she uses about 3 large overnight pads and 3 super tampons per day. Pt states she sometimes gets menstrual period twice a month. Pt notes that menses last for 6-7 days with days 1-2 being heavy and painful. After that the bleeding is spotting and the cramping is improved. She does not want to get out of bed on days 1-2. The cramping is similar to uterine contractions from labor. Pt also has noticed that sex is painful now; deep insertion is painful and makes her avoid sex. She is doing her normal sexual activities. Pt also notes that she will start spotting 10 days after her menses has ended. The GYN she saw in [facility] did an [address] (report not available) and an EMB that showed benign endometrium. The patient was considering an endometrial ablation but wanted to come back to home in [facility] for a second opinion. Her current method of family planning is tubal ligation. The patient is sexually active.      Past Medical History:    Past Medical History:   Diagnosis Date    Chronic pain of both knees 10/16/2020    Gastritis, Helicobacter pylori     Hypertension     Migraine without status migrainosus, not intractable, unspecified migraine type 2/23/2022    Obesity         Past Surgical History:   Procedure Laterality Date    IR CHOLECYSTOSTOMY PERCUTANEOUS COMPLETE      ORTHOPEDIC SURGERY  06/21/2021    carpel tunnel surgery - L hand    TUBAL LIGATION  2000    US ASP BREAST CYST LEFT

## 2023-07-10 NOTE — PROGRESS NOTES
1. \"Have you been to the ER, urgent care clinic since your last visit? Hospitalized since your last visit? \" No    2. \"Have you seen or consulted any other health care providers outside of the [address] [place] [address] since your last visit? \" No    3. For patients aged 43-73: Has the patient had a colonoscopy / FIT/ Cologuard? Recommendation: Colonoscopy every 10y or annual FIT test from 50-75 or every 3 year stool DNA based test with consideration of ongoing screening from 76-85. If the patient is female:    4. For patients aged 43-66: Has the patient had a mammogram within the past 2 years? No    5. For patients aged 21-65: Has the patient had a pap smear?  No

## 2023-07-11 ENCOUNTER — TELEPHONE (OUTPATIENT)
Age: 43
End: 2023-07-11

## 2023-07-11 NOTE — TELEPHONE ENCOUNTER
Patient calling name and  verified.  Patient states she was told by pharmacy she needs prior authorization for medication      Elagolix-Estrad-Noreth & Elago 300-1-0.5 & 300 MG [facility] [[id number]

## 2023-07-12 ENCOUNTER — OFFICE VISIT (OUTPATIENT)
Age: 43
End: 2023-07-12
Payer: MEDICAID

## 2023-07-12 VITALS
DIASTOLIC BLOOD PRESSURE: 76 MMHG | BODY MASS INDEX: 35.84 KG/M2 | RESPIRATION RATE: 18 BRPM | TEMPERATURE: 98.2 F | SYSTOLIC BLOOD PRESSURE: 118 MMHG | HEART RATE: 61 BPM | HEIGHT: 71 IN | OXYGEN SATURATION: 99 % | WEIGHT: 256 LBS

## 2023-07-12 DIAGNOSIS — R93.5 ABNORMAL ULTRASOUND OF UTERUS: ICD-10-CM

## 2023-07-12 DIAGNOSIS — D25.0 SUBMUCOUS LEIOMYOMA OF UTERUS: Primary | ICD-10-CM

## 2023-07-12 PROBLEM — R76.8 HELICOBACTER PYLORI AB+: Status: RESOLVED | Noted: 2018-05-21 | Resolved: 2023-07-12

## 2023-07-12 PROBLEM — M25.562 CHRONIC PAIN OF BOTH KNEES: Status: RESOLVED | Noted: 2020-10-16 | Resolved: 2023-07-12

## 2023-07-12 PROBLEM — R10.10 PAIN OF UPPER ABDOMEN: Status: RESOLVED | Noted: 2018-05-15 | Resolved: 2023-07-12

## 2023-07-12 PROBLEM — K29.70 GASTRITIS WITHOUT BLEEDING: Status: RESOLVED | Noted: 2018-05-15 | Resolved: 2023-07-12

## 2023-07-12 PROBLEM — M25.561 CHRONIC PAIN OF BOTH KNEES: Status: RESOLVED | Noted: 2020-10-16 | Resolved: 2023-07-12

## 2023-07-12 PROBLEM — G89.29 CHRONIC PAIN OF BOTH KNEES: Status: RESOLVED | Noted: 2020-10-16 | Resolved: 2023-07-12

## 2023-07-12 PROCEDURE — 99213 OFFICE O/P EST LOW 20 MIN: CPT | Performed by: OBSTETRICS & GYNECOLOGY

## 2023-07-12 PROCEDURE — 3078F DIAST BP <80 MM HG: CPT | Performed by: OBSTETRICS & GYNECOLOGY

## 2023-07-12 PROCEDURE — 3074F SYST BP LT 130 MM HG: CPT | Performed by: OBSTETRICS & GYNECOLOGY

## 2023-07-12 SDOH — ECONOMIC STABILITY: HOUSING INSECURITY
IN THE LAST 12 MONTHS, WAS THERE A TIME WHEN YOU DID NOT HAVE A STEADY PLACE TO SLEEP OR SLEPT IN A SHELTER (INCLUDING NOW)?: NO

## 2023-07-12 SDOH — ECONOMIC STABILITY: FOOD INSECURITY: WITHIN THE PAST 12 MONTHS, THE FOOD YOU BOUGHT JUST DIDN'T LAST AND YOU DIDN'T HAVE MONEY TO GET MORE.: NEVER TRUE

## 2023-07-12 SDOH — ECONOMIC STABILITY: INCOME INSECURITY: HOW HARD IS IT FOR YOU TO PAY FOR THE VERY BASICS LIKE FOOD, HOUSING, MEDICAL CARE, AND HEATING?: NOT VERY HARD

## 2023-07-12 SDOH — ECONOMIC STABILITY: FOOD INSECURITY: WITHIN THE PAST 12 MONTHS, YOU WORRIED THAT YOUR FOOD WOULD RUN OUT BEFORE YOU GOT MONEY TO BUY MORE.: NEVER TRUE

## 2023-07-12 NOTE — TELEPHONE ENCOUNTER
Called patient verified name and .  Patient states she does not have her insurance card they are supposed to be mailing her a new one but here is the number they gave her still [facility]    [id number]    [facility] number [id number]

## 2023-07-13 ENCOUNTER — TELEPHONE (OUTPATIENT)
Age: 43
End: 2023-07-13

## 2023-07-13 ENCOUNTER — PREP FOR PROCEDURE (OUTPATIENT)
Facility: HOSPITAL | Age: 43
End: 2023-07-13

## 2023-07-13 DIAGNOSIS — D25.0 INTRAMURAL, SUBMUCOUS, AND SUBSEROUS LEIOMYOMA OF UTERUS: Primary | ICD-10-CM

## 2023-07-13 DIAGNOSIS — D25.1 INTRAMURAL, SUBMUCOUS, AND SUBSEROUS LEIOMYOMA OF UTERUS: Primary | ICD-10-CM

## 2023-07-13 DIAGNOSIS — D25.2 INTRAMURAL, SUBMUCOUS, AND SUBSEROUS LEIOMYOMA OF UTERUS: Primary | ICD-10-CM

## 2023-07-13 DIAGNOSIS — N94.6 DYSMENORRHEA: ICD-10-CM

## 2023-07-13 DIAGNOSIS — N93.9 ABNORMAL UTERINE BLEEDING (AUB): ICD-10-CM

## 2023-07-13 RX ORDER — SODIUM CHLORIDE, SODIUM LACTATE, POTASSIUM CHLORIDE, CALCIUM CHLORIDE 600; 310; 30; 20 MG/100ML; MG/100ML; MG/100ML; MG/100ML
INJECTION, SOLUTION INTRAVENOUS CONTINUOUS
Status: CANCELLED | OUTPATIENT
Start: 2023-07-13

## 2023-07-13 RX ORDER — SODIUM CHLORIDE 0.9 % (FLUSH) 0.9 %
5-40 SYRINGE (ML) INJECTION PRN
Status: CANCELLED | OUTPATIENT
Start: 2023-07-13

## 2023-07-13 RX ORDER — ACETAMINOPHEN 500 MG
1000 TABLET ORAL ONCE
Status: CANCELLED | OUTPATIENT
Start: 2023-07-13 | End: 2023-07-13

## 2023-07-13 RX ORDER — SODIUM CHLORIDE 9 MG/ML
INJECTION, SOLUTION INTRAVENOUS PRN
Status: CANCELLED | OUTPATIENT
Start: 2023-07-13

## 2023-07-13 RX ORDER — SODIUM CHLORIDE 0.9 % (FLUSH) 0.9 %
5-40 SYRINGE (ML) INJECTION EVERY 12 HOURS SCHEDULED
Status: CANCELLED | OUTPATIENT
Start: 2023-07-13

## 2023-07-13 NOTE — TELEPHONE ENCOUNTER
Patient calling name and  verified. Patient states she is having abdominal pain from her fibroids. She rates pain 10/10 patient advised she is taking 500 mg of ibuprofen ? ?? Patient advised to go to er for 10/10 pain       Spoke with Zaina Jara he saw patient yesterday he states she can have ibuprofen otc 600-800 mg every 6-8 hours as needed. Patient advised she knows to go to er if pain worsens.

## 2023-07-25 ENCOUNTER — TELEPHONE (OUTPATIENT)
Age: 43
End: 2023-07-25

## 2023-07-25 NOTE — TELEPHONE ENCOUNTER
Pt called with questions surrounding the medication that you prescribed that needs the prior auth. Pt is considering waiting until after the holidays to do the surgery. Pt would like to know if she can stay on the medication the entire time and how long should she plan to be out post surgery?     CB# is [phone]

## 2023-07-25 NOTE — TELEPHONE ENCOUNTER
Returned call to patient regarding hysterectomy and time away from work. Advised patient per  Jose Luis Risk would be out of work for at least 2 weeks. 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 Hopefully after 4 weeks she would be able to resume normal route. The patient verbalized understanding. Would like to postpone surgery until around the holidays-- recently started a new job and does not have enough PTO yet. Patient said around October she should have enough and be able to use short term disability if needed. Patient to call in September if still interested in doing hyst around the holidays. IS interested in starting MyFembree-- aware she can take the medication for up to 2 years.

## 2023-07-26 ENCOUNTER — TELEPHONE (OUTPATIENT)
Age: 43
End: 2023-07-26

## 2023-07-27 NOTE — TELEPHONE ENCOUNTER
MyChart msg sent advising patient Myfembree prescription was sent to [facility] 7/10/23 and we are awaiting approval per Dr. Deysi Oliva.

## 2023-08-01 ENCOUNTER — TELEPHONE (OUTPATIENT)
Age: 43
End: 2023-08-01

## 2023-08-01 NOTE — TELEPHONE ENCOUNTER
Pt called to inquire about a UFE procedure. Side effects, down time, etc. Pt was made aware you are out until 8/3 and verbalized understanding.      CB# is [phone]

## 2023-08-03 ENCOUNTER — TELEPHONE (OUTPATIENT)
Age: 43
End: 2023-08-03

## 2023-08-08 ENCOUNTER — TELEPHONE (OUTPATIENT)
Age: 43
End: 2023-08-08

## 2023-08-09 NOTE — TELEPHONE ENCOUNTER
5/14/2021        RE: Chelsey Casillas  [facility]  [address] [facility] [address]        [facility]  [facility] Record Number:  [id number]  Place of Service where encounter took place:  [facility] () [[id number]]  Chief Complaint   Patient presents with     RECHECK       HPI:    Chelsey Casillas  is a 77 year old (1944), who is being seen today for an episodic care visit.  HPI information obtained from: facility chart records, facility staff and patient report.     Today's concern is:  Gastroesophageal reflux disease without esophagitis  Dementia with psychosis (H)  Severe major depression with psychotic features (H)  Patient noted to have weight gain. Omeprazole added in January due to daily c/o dyspepsia. She denies any dyspepsia today. Staff report that she is eating well. They have not noted any edema or SOB. No behavioral concerns per staff.   Wt Readings from Last 4 Encounters:   05/14/21 76.9 kg (169 lb 8 oz)   04/06/21 72.8 kg (160 lb 6.4 oz)   03/24/21 72.8 kg (160 lb 6.4 oz)   01/06/21 73.7 kg (162 lb 8 oz)       Essential hypertension  No longer on antihypertensive medications  -130s/70-80s  HR 70-80s      Past Medical and Surgical History reviewed in Epic today.    MEDICATIONS:    Current Outpatient Medications   Medication Sig Dispense Refill     Acetaminophen (TYLENOL PO) Take 1,000 mg by mouth 2 times daily        albuterol (PROAIR HFA/PROVENTIL HFA/VENTOLIN HFA) 108 (90 BASE) MCG/ACT Inhaler Inhale 2 puffs into the lungs every 4 hours as needed for shortness of breath / dyspnea or wheezing        nystatin (MYCOSTATIN) 819885 UNIT/GM external powder Apply topically 2 times daily as needed (Itching and moisture under breasts)       omeprazole (PRILOSEC) 20 MG DR capsule Take 20 mg by mouth daily       risperiDONE (RISPERDAL) 0.5 MG tablet Take 1 tablet (0.5 mg) by mouth At Bedtime 62 tablet 3     sertraline (ZOLOFT) 100 MG tablet Take 1  Pts insurance will not cover Henri Lis prescription without pt trying alternatives first or having surgery to remove fibroids first.  Discussed this with pt in detail. Pt requests information on UFE. Sent Xeebel message with information for [facility]. tablet (100 mg) by mouth At Bedtime 31 tablet 98     vitamin D3 (CHOLECALCIFEROL) 2000 units (50 mcg) tablet Take 1 tablet (2,000 Units) by mouth At Bedtime 30 tablet        REVIEW OF SYSTEMS:  Limited secondary to cognitive impairment but today pt reports 10 point ROS of systems including Constitutional, Eyes, Respiratory, Cardiovascular, Gastroenterology, Genitourinary, Integumentary, Musculoskeletal, Psychiatric were all negative except for pertinent positives noted in my HPI.    Objective:  /71   Pulse 80   Temp 97.5  F (36.4  C)   Resp 18   Ht 1.524 m (5')   Wt 76.9 kg (169 lb 8 oz)   SpO2 96%   BMI 33.10 kg/m    Exam:  GENERAL APPEARANCE:  Alert, in no distress, cooperative  EYES:  EOM normal, conjunctiva and lids normal  RESP:  respiratory effort and palpation of chest normal, lungs clear to auscultation , no respiratory distress  CV:  Palpation and auscultation of heart done , regular rate and rhythm, no murmur, rub, or gallop, no edema  ABDOMEN:  normal bowel sounds, soft, nontender, no hepatosplenomegaly or other masses  SKIN:  Inspection of skin and subcutaneous tissue baseline, Palpation of skin and subcutaneous tissue baseline  PSYCH:  insight and judgement impaired, memory impaired , affect and mood normal      Labs:   Recent labs in [facility] reviewed by me today.     ASSESSMENT/PLAN:  (K21.9) Gastroesophageal reflux disease without esophagitis  (primary encounter diagnosis)  Comment: Well controlled with PPI. Due to comfort care goals, will not attempt to wean this  Plan: Continue current POC with no changes at this time and adjustments as needed.    (F03.91) Dementia with psychosis (H)  (F32.3) Severe major depression with psychotic features (H)  Comment: Requires 24 hour care, assist with all ADLs. Patient appears to be thriving currently with no significant mood concerns. No reports of hallucinations. Again, due to comfort care, would not try to taper any medications.   Plan: Continue 24  hour care. Monitor weight. Monitor functional status. Monitor for behavioral disturbances.    (I10) Essential hypertension  Comment: Remains well controlled off medication. BP goals are <150/90 mm Hg.This is higher than ACC and AHA recommendations due to goals of care, risk for hypotension, risk of dizziness and falls and risk of tissue/cerebral hypoperfusion. Patient is stable and continue without pharmacological invention with routine assessment.      Electronically signed by:  ALCIRA Sweet [facility]  Phone: [phone]

## 2023-10-24 DIAGNOSIS — J30.1 SEASONAL ALLERGIC RHINITIS DUE TO POLLEN: Primary | ICD-10-CM

## 2023-10-24 RX ORDER — FLUTICASONE PROPIONATE 50 MCG
2 SPRAY, SUSPENSION (ML) NASAL DAILY
Qty: 16 G | Refills: 1 | Status: SHIPPED | OUTPATIENT
Start: 2023-10-24

## 2023-10-24 NOTE — TELEPHONE ENCOUNTER
Requested Prescriptions     Pending Prescriptions Disp Refills    fluticasone (FLONASE) 50 MCG/ACT nasal spray 16 g 1     Si sprays by Nasal route daily         [address] [place] [address] [phone] [facility] [phone]  [address] [place] [address]  Phone: [phone] Fax: [phone]       Last appt 7/10/2023      No future appointments.

## 2023-10-24 NOTE — TELEPHONE ENCOUNTER
fluticasone (FLONASE) 50 MCG/ACT nasal spray     Last OV: 07/10/2023  Next OV: no upcoming     [address] [phone] - f [phone]

## 2024-02-09 ENCOUNTER — OFFICE VISIT (OUTPATIENT)
Age: 44
End: 2024-02-09
Payer: COMMERCIAL

## 2024-02-09 VITALS — BODY MASS INDEX: 34.31 KG/M2 | DIASTOLIC BLOOD PRESSURE: 70 MMHG | WEIGHT: 246 LBS | SYSTOLIC BLOOD PRESSURE: 110 MMHG

## 2024-02-09 DIAGNOSIS — R39.89 ABNORMAL UROGENITAL DISCHARGE: ICD-10-CM

## 2024-02-09 DIAGNOSIS — R35.0 URINARY FREQUENCY: Primary | ICD-10-CM

## 2024-02-09 LAB
BILIRUBIN, URINE, POC: NEGATIVE
BLOOD URINE, POC: NEGATIVE
GLUCOSE URINE, POC: NEGATIVE
KETONES, URINE, POC: NEGATIVE
LEUKOCYTE ESTERASE, URINE, POC: NEGATIVE
NITRITE, URINE, POC: NEGATIVE
PH, URINE, POC: 5.5 (ref 4.6–8)
PROTEIN,URINE, POC: NORMAL
SPECIFIC GRAVITY, URINE, POC: >=1.03 (ref 1–1.03)
URINALYSIS CLARITY, POC: CLEAR
URINALYSIS COLOR, POC: NORMAL
UROBILINOGEN, POC: NORMAL

## 2024-02-09 PROCEDURE — 3074F SYST BP LT 130 MM HG: CPT | Performed by: OBSTETRICS & GYNECOLOGY

## 2024-02-09 PROCEDURE — 99213 OFFICE O/P EST LOW 20 MIN: CPT | Performed by: OBSTETRICS & GYNECOLOGY

## 2024-02-09 PROCEDURE — 3078F DIAST BP <80 MM HG: CPT | Performed by: OBSTETRICS & GYNECOLOGY

## 2024-02-09 PROCEDURE — 81001 URINALYSIS AUTO W/SCOPE: CPT | Performed by: OBSTETRICS & GYNECOLOGY

## 2024-02-09 ASSESSMENT — PATIENT HEALTH QUESTIONNAIRE - PHQ9
SUM OF ALL RESPONSES TO PHQ9 QUESTIONS 1 & 2: 0
SUM OF ALL RESPONSES TO PHQ QUESTIONS 1-9: 0
SUM OF ALL RESPONSES TO PHQ QUESTIONS 1-9: 0
1. LITTLE INTEREST OR PLEASURE IN DOING THINGS: 0
SUM OF ALL RESPONSES TO PHQ QUESTIONS 1-9: 0
2. FEELING DOWN, DEPRESSED OR HOPELESS: 0
SUM OF ALL RESPONSES TO PHQ QUESTIONS 1-9: 0

## 2024-02-09 NOTE — PROGRESS NOTES
identified    Genitourinary  External Genitalia: normal appearance for age, no discharge present, no tenderness present, no inflammatory lesions present, no masses present, no atrophy present  Vagina: normal vaginal vault without defects, no discharge present, no inflammatory lesions present, no masses present  Bladder: non-tender to palpation  Urethra: appears normal  Cervix: normal appearing cervix with no discharge present   Uterus: enlarged fibroid uterus. Approximately 15 wks. Lower uterine segment fibroid.   Adnexa: no adnexal tenderness present, no adnexal masses present  Perineum: perineum within normal limits, no evidence of trauma, no rashes or skin lesions present  Anus: anus within normal limits, no hemorrhoids present  Inguinal Lymph Nodes: no lymphadenopathy present    Skin  General Inspection: no rash, no lesions identified    Neurologic/Psychiatric  Mental Status:  Orientation: grossly oriented to person, place and time  Mood and Affect: mood normal, affect appropriate    Assessment/Plan:     Abnormal discharge after sex: Normal vaginal exam today without evidence of tissue. Will check US to make sure she does not have a prolapsing fibroid.       Urinary Frequency: UA normal. Monitor. '  RTC 2 wks for US and follow up      Chino Berumen MD

## 2024-02-17 DIAGNOSIS — G43.009 MIGRAINE WITHOUT AURA AND WITHOUT STATUS MIGRAINOSUS, NOT INTRACTABLE: ICD-10-CM

## 2024-02-19 RX ORDER — TOPIRAMATE 50 MG/1
50 TABLET, FILM COATED ORAL 2 TIMES DAILY
Qty: 60 TABLET | Refills: 0 | Status: SHIPPED | OUTPATIENT
Start: 2024-02-19

## 2024-02-27 ENCOUNTER — TELEMEDICINE (OUTPATIENT)
Age: 44
End: 2024-02-27
Payer: COMMERCIAL

## 2024-02-27 DIAGNOSIS — G43.009 MIGRAINE WITHOUT AURA AND WITHOUT STATUS MIGRAINOSUS, NOT INTRACTABLE: ICD-10-CM

## 2024-02-27 DIAGNOSIS — E55.9 VITAMIN D DEFICIENCY: ICD-10-CM

## 2024-02-27 DIAGNOSIS — E66.9 OBESITY (BMI 30.0-34.9): ICD-10-CM

## 2024-02-27 DIAGNOSIS — J30.89 NON-SEASONAL ALLERGIC RHINITIS, UNSPECIFIED TRIGGER: ICD-10-CM

## 2024-02-27 DIAGNOSIS — I10 ESSENTIAL HYPERTENSION: ICD-10-CM

## 2024-02-27 DIAGNOSIS — I10 ESSENTIAL HYPERTENSION: Primary | ICD-10-CM

## 2024-02-27 DIAGNOSIS — G89.29 CHRONIC NONINTRACTABLE HEADACHE, UNSPECIFIED HEADACHE TYPE: ICD-10-CM

## 2024-02-27 DIAGNOSIS — J30.1 SEASONAL ALLERGIC RHINITIS DUE TO POLLEN: ICD-10-CM

## 2024-02-27 DIAGNOSIS — R51.9 CHRONIC NONINTRACTABLE HEADACHE, UNSPECIFIED HEADACHE TYPE: ICD-10-CM

## 2024-02-27 PROBLEM — E66.811 OBESITY (BMI 30.0-34.9): Status: ACTIVE | Noted: 2018-05-15

## 2024-02-27 PROCEDURE — 99214 OFFICE O/P EST MOD 30 MIN: CPT | Performed by: INTERNAL MEDICINE

## 2024-02-27 RX ORDER — AMLODIPINE BESYLATE 10 MG/1
10 TABLET ORAL DAILY
Qty: 90 TABLET | Refills: 1 | Status: SHIPPED | OUTPATIENT
Start: 2024-02-27

## 2024-02-27 RX ORDER — HYDROCHLOROTHIAZIDE 25 MG/1
25 TABLET ORAL DAILY
Qty: 90 TABLET | Refills: 1 | Status: SHIPPED | OUTPATIENT
Start: 2024-02-27

## 2024-02-27 RX ORDER — FLUTICASONE PROPIONATE 50 MCG
2 SPRAY, SUSPENSION (ML) NASAL DAILY
Qty: 16 G | Refills: 1 | Status: SHIPPED | OUTPATIENT
Start: 2024-02-27

## 2024-02-27 RX ORDER — TOPIRAMATE 50 MG/1
50 TABLET, FILM COATED ORAL 2 TIMES DAILY
Qty: 60 TABLET | Refills: 5 | Status: SHIPPED | OUTPATIENT
Start: 2024-02-27

## 2024-02-27 SDOH — ECONOMIC STABILITY: FOOD INSECURITY: WITHIN THE PAST 12 MONTHS, YOU WORRIED THAT YOUR FOOD WOULD RUN OUT BEFORE YOU GOT MONEY TO BUY MORE.: NEVER TRUE

## 2024-02-27 SDOH — ECONOMIC STABILITY: INCOME INSECURITY: HOW HARD IS IT FOR YOU TO PAY FOR THE VERY BASICS LIKE FOOD, HOUSING, MEDICAL CARE, AND HEATING?: NOT VERY HARD

## 2024-02-27 SDOH — ECONOMIC STABILITY: FOOD INSECURITY: WITHIN THE PAST 12 MONTHS, THE FOOD YOU BOUGHT JUST DIDN'T LAST AND YOU DIDN'T HAVE MONEY TO GET MORE.: NEVER TRUE

## 2024-02-27 ASSESSMENT — ANXIETY QUESTIONNAIRES
7. FEELING AFRAID AS IF SOMETHING AWFUL MIGHT HAPPEN: 0
IF YOU CHECKED OFF ANY PROBLEMS ON THIS QUESTIONNAIRE, HOW DIFFICULT HAVE THESE PROBLEMS MADE IT FOR YOU TO DO YOUR WORK, TAKE CARE OF THINGS AT HOME, OR GET ALONG WITH OTHER PEOPLE: NOT DIFFICULT AT ALL
6. BECOMING EASILY ANNOYED OR IRRITABLE: 0
2. NOT BEING ABLE TO STOP OR CONTROL WORRYING: 0
4. TROUBLE RELAXING: 0
GAD7 TOTAL SCORE: 0
3. WORRYING TOO MUCH ABOUT DIFFERENT THINGS: 0
1. FEELING NERVOUS, ANXIOUS, OR ON EDGE: 0
5. BEING SO RESTLESS THAT IT IS HARD TO SIT STILL: 0

## 2024-02-27 NOTE — PROGRESS NOTES
\"Have you been to the ER, urgent care clinic since your last visit?  Hospitalized since your last visit?\"    NO    “Have you seen or consulted any other health care providers outside of [facility] since your last visit?”    NO    “Have you had a colorectal cancer screening such as a colonoscopy/FIT/Cologuard?    NO      “Have you had a pap smear?”    NO          
few chronic medical issues as documented.      Reports BP being stable at home.  Amlodipine and hydrochlorothiazide.  No side effects.    Has chronic allergies.  Flonase helps.  Needs a refill.    Her chronic migraine type headaches have been stable Topamax.  Needs refills.    Taking precautions for Covid 19. Denies any related signs or symptoms including fever, cough, SOB or CP.     PMH/PSH/Allergies/Social History/medication list and most recent studies reviewed with patient.    Tobacco use: No  Alcohol use: Social    Reports compliance with medications and diet.  BMI is around 35.  Trying to be active physically to control weight. Reports no other new c/o.    Plan:  Continue current medications  Watch diet and remain active physically  Follow-up with other MDs/specialists as scheduled  COVID-19 precautions discussed with pt  Follow-up 6 months or as needed         Objective   Patient-Reported Vitals  No data recorded     Physical Exam  [INSTRUCTIONS:  \"[x]\" Indicates a positive item  \"[]\" Indicates a negative item  -- DELETE ALL ITEMS NOT EXAMINED]    Constitutional: [x] Appears well-developed and well-nourished [x] No apparent distress      [] Abnormal -     Mental status: [x] Alert and awake  [x] Oriented to person/place/time [x] Able to follow commands    [] Abnormal -     Eyes:   EOM    [x]  Normal    [] Abnormal -   Sclera  [x]  Normal    [] Abnormal -          Discharge [x]  None visible   [] Abnormal -     HENT: [x] Normocephalic, atraumatic  [] Abnormal -   [x] Mouth/Throat: Mucous membranes are moist    External Ears [x] Normal  [] Abnormal -    Neck: [x] No visualized mass [] Abnormal -     Pulmonary/Chest: [x] Respiratory effort normal   [x] No visualized signs of difficulty breathing or respiratory distress        [] Abnormal -      Musculoskeletal:   [x] Normal gait with no signs of ataxia         [x] Normal range of motion of neck        [] Abnormal -     Neurological:        [x] No Facial Asymmetry

## 2024-02-28 ENCOUNTER — OFFICE VISIT (OUTPATIENT)
Age: 44
End: 2024-02-28
Payer: COMMERCIAL

## 2024-02-28 VITALS — SYSTOLIC BLOOD PRESSURE: 120 MMHG | BODY MASS INDEX: 35.15 KG/M2 | DIASTOLIC BLOOD PRESSURE: 78 MMHG | WEIGHT: 252 LBS

## 2024-02-28 DIAGNOSIS — N89.8 VAGINAL DISCHARGE: ICD-10-CM

## 2024-02-28 DIAGNOSIS — R87.9 UNSPECIFIED ABNORMAL FINDING IN SPECIMENS FROM FEMALE GENITAL ORGANS: ICD-10-CM

## 2024-02-28 DIAGNOSIS — D25.1 INTRAMURAL LEIOMYOMA OF UTERUS: Primary | ICD-10-CM

## 2024-02-28 PROBLEM — N76.0 ACUTE VAGINITIS: Status: RESOLVED | Noted: 2022-02-25 | Resolved: 2024-02-28

## 2024-02-28 PROCEDURE — 3074F SYST BP LT 130 MM HG: CPT | Performed by: OBSTETRICS & GYNECOLOGY

## 2024-02-28 PROCEDURE — 99214 OFFICE O/P EST MOD 30 MIN: CPT | Performed by: OBSTETRICS & GYNECOLOGY

## 2024-02-28 PROCEDURE — 3078F DIAST BP <80 MM HG: CPT | Performed by: OBSTETRICS & GYNECOLOGY

## 2024-02-28 RX ORDER — FLUCONAZOLE 150 MG/1
150 TABLET ORAL
Qty: 3 TABLET | Refills: 0 | Status: SHIPPED | OUTPATIENT
Start: 2024-02-28 | End: 2024-03-08

## 2024-02-28 RX ORDER — NORETHINDRONE ACETATE AND ETHINYL ESTRADIOL 1MG-20(21)
1 KIT ORAL DAILY
COMMUNITY

## 2024-02-28 NOTE — PROGRESS NOTES
weakness  Psych: negative for anxiety, depression, change in mood  Heme/lymph: negative for bleeding, bruising, pallor      Objective:  /78 (Site: Right Upper Arm, Position: Sitting)   Wt 114.3 kg (252 lb)   LMP 01/17/2024   BMI 35.15 kg/m²     Physical Exam  Constitutional:       Appearance: Normal appearance. She is obese.   Genitourinary:      Urethral meatus normal.      Genitourinary Comments: Attempted to do EMB but unable to reach cervix. Noted a thick white discharge that when I showed it to the pt she said that looked like the tissue she notes after sex.       Right Labia: No rash.     Left Labia: No rash.     Vaginal discharge present.      No vaginal atrophy present.     No urethral tenderness present.   HENT:      Head: Normocephalic.      Nose: Nose normal.      Mouth/Throat:      Mouth: Mucous membranes are moist.   Cardiovascular:      Rate and Rhythm: Normal rate and regular rhythm.      Heart sounds: Normal heart sounds.   Pulmonary:      Effort: Pulmonary effort is normal.      Breath sounds: Normal breath sounds.   Abdominal:      General: Abdomen is flat.      Palpations: Abdomen is soft.   Neurological:      Mental Status: She is alert.   Vitals and nursing note reviewed. Exam conducted with a chaperone present.        Assessment/Plan:     Pt here for ultrasound follow up for uterine fibroids/abnormal passage of tissue after sex.     US today showed stable fibroids and no evidence of prolapsing fibroid. EMS is thinner today and the area of concern for a polyp noted on last ultrasound does not seem as prominent today. Discussed findings with patient and reviewed her concerns about the tissue that she sees after sex. Offered to sample the endometrium with EMB today and she agreed. Attempted to do EMB but unable to get the cervix into proper view due to side wall collapse and speculum not being long enough. Based on discharge findings and pt input, will treat for yeast and have her return

## 2024-03-13 ENCOUNTER — OFFICE VISIT (OUTPATIENT)
Age: 44
End: 2024-03-13

## 2024-03-13 VITALS — SYSTOLIC BLOOD PRESSURE: 122 MMHG | DIASTOLIC BLOOD PRESSURE: 74 MMHG | BODY MASS INDEX: 34.17 KG/M2 | WEIGHT: 245 LBS

## 2024-03-13 DIAGNOSIS — R87.9 UNSPECIFIED ABNORMAL FINDING IN SPECIMENS FROM FEMALE GENITAL ORGANS: Primary | ICD-10-CM

## 2024-03-13 NOTE — PROGRESS NOTES
Procedure note: Endometrial biopsy    Earnestine Tavares is a ,  43 y.o. female No LMP recorded.    The patient has a history of The encounter diagnosis was Unspecified abnormal finding in specimens from female genital organs. and presents for an endometrial biopsy to evaluate  abnormal tissue noted after sex .     Indications:   After the indications, risks, benefits, and alternatives to performing an endometrial biopsy were explained to the patient, her questions were answered and informed consent was obtained.     UPT: negative    Procedure:  After a brief TIME OUT, the patient was placed on the table in the dorsal lithotomy position. A bimanual exam showed the uterus with the findings as noted above in the physical exam.   A speculum was placed in the vagina. The cervix was visualized and prepped with betadine. A tenaculum was was needed on the anterior lip of the cervix for traction. It was was notnecessary to dilate the cervix. A pipelle was passed through the endocervical canal without difficulty. The uterus was sounded to 7 cm. A Small amount of tissue was returned. This tissue was placed in formalin and sent to pathology.   It was felt that an adequate sample was obtained.     The patient tolerated the procedure well and she reported mild cramping. The tenaculum and speculum were removed.     Post Procedural Status:  The patient was observed for 10 minutes after the procedure. She had mild cramping at the time of discharge. There were no complications.   The patient was discharged in stable condition.     Chino Berumen MD

## 2024-03-13 NOTE — TELEPHONE ENCOUNTER
Patient called asking to speak to nurse about medication and wrong forms that was faxed.  Her call back number is [phone]
Spoke with pt. Correct forms faxed. Awaiting outcome.
No/Unable to asses

## 2024-03-18 LAB
CPT BILLING CODE: NORMAL
DIAGNOSIS SYNOPSIS:: NORMAL
DX ICD CODE: NORMAL
DX ICD CODE: NORMAL
PATH REPORT.FINAL DX SPEC: NORMAL
PATH REPORT.GROSS SPEC: NORMAL
PATH REPORT.RELEVANT HX SPEC: NORMAL
PATH REPORT.SITE OF ORIGIN SPEC: NORMAL
PATHOLOGIST NAME: NORMAL
PAYMENT PROCEDURE: NORMAL

## 2024-04-04 ENCOUNTER — TELEMEDICINE (OUTPATIENT)
Age: 44
End: 2024-04-04
Payer: COMMERCIAL

## 2024-04-04 ENCOUNTER — TELEPHONE (OUTPATIENT)
Age: 44
End: 2024-04-04

## 2024-04-04 DIAGNOSIS — R19.7 DIARRHEA OF PRESUMED INFECTIOUS ORIGIN: Primary | ICD-10-CM

## 2024-04-04 DIAGNOSIS — R10.9 ABDOMINAL CRAMPS: ICD-10-CM

## 2024-04-04 PROCEDURE — 99213 OFFICE O/P EST LOW 20 MIN: CPT | Performed by: CLINICAL NURSE SPECIALIST

## 2024-04-04 RX ORDER — SELENIUM 50 MCG
2 TABLET ORAL 2 TIMES DAILY
Qty: 120 CAPSULE | Refills: 0 | Status: SHIPPED | OUTPATIENT
Start: 2024-04-04 | End: 2024-05-04

## 2024-04-04 RX ORDER — DICYCLOMINE HCL 20 MG
20 TABLET ORAL 4 TIMES DAILY PRN
Qty: 120 TABLET | Refills: 0 | Status: SHIPPED | OUTPATIENT
Start: 2024-04-04 | End: 2024-05-04

## 2024-04-04 RX ORDER — ONDANSETRON 4 MG/1
TABLET, ORALLY DISINTEGRATING ORAL
COMMUNITY
Start: 2024-03-22

## 2024-04-04 RX ORDER — DOXYCYCLINE HYCLATE 100 MG
100 TABLET ORAL 2 TIMES DAILY
Qty: 20 TABLET | Refills: 0 | Status: SHIPPED | OUTPATIENT
Start: 2024-04-04 | End: 2024-04-14

## 2024-04-04 ASSESSMENT — ENCOUNTER SYMPTOMS
SHORTNESS OF BREATH: 0
ABDOMINAL PAIN: 1
VOMITING: 0
DIARRHEA: 1

## 2024-04-04 NOTE — PROGRESS NOTES
Had COVID, bowels were messed up, and then got back to normal, at a seafood boil bag and since she has been having watery stools 7-8 times a day for a week now.     Boyfriend ate this too but he didn't eat the leftovers like she did.    Patient reports something similar when she had bacteria in her stomach and they gave her medication and it went away.     Every time she eats she goes to the bathroom and on top of it she having abdominal pain.      Denies n/v.     \"Have you been to the ER, urgent care clinic since your last visit?  Hospitalized since your last visit?\"    NO    “Have you seen or consulted any other health care providers outside of [facility] since your last visit?”    NO     “Have you had a pap smear?”    YES - Where:  at [facility] Nurse/CMA to request most recent records if not in the chart    Date of last Cervical Cancer screen (HPV or PAP): 4/17/2018         “Have you had a colorectal cancer screening such as a colonoscopy/FIT/Cologuard?    YES - Type: Colonoscopy 2 years ago on [facility]      No colonoscopy on file  No cologuard on file  No FIT/FOBT on file   No flexible sigmoidoscopy on file         Click Here for Release of Records Request

## 2024-04-04 NOTE — PROGRESS NOTES
Earnestine Tavares (:  1980) is a 43 y.o. female,Established patient, here for evaluation of the following chief complaint(s):  Diarrhea         ASSESSMENT/PLAN:  1. Diarrhea of presumed infectious origin  2. Abdominal cramps    Educated on bland diet, encouraged to hydrate with Gatorade.   Start doxycyline BID x 10 days, discussed indication and potential side effects.   Start dicyclomine PRN abdominal caramps, start lactobacillus.   Encouraged to call with any new or worsening symptoms.   If no improvement, will consider stool sample as well as CBC/CMP.           Return if symptoms worsen or fail to improve.         Subjective   SUBJECTIVE/OBJECTIVE:  Earnestine presents for a problem visit. Over the course of about one week she has had diarrhea.   Of note, the week prior she had COVID-19 and was experiencing diarrhea but then bowels returned to normal.   Bowels returned to normal but then she consumed a seafood boil and has had persistent diarrhea since.   She is experiencing 7-8 bowel movements daily, they are typically proceeded by abdominal cramps.   She is not eating much because every time she eats, she has to go to the bathroom right after.   Has been trying to drink a lot of water. She has a history of bacterial gastroenteritis.         Review of Systems   Constitutional:  Negative for chills and fever.   Respiratory:  Negative for shortness of breath.    Cardiovascular:  Negative for chest pain.   Gastrointestinal:  Positive for abdominal pain and diarrhea. Negative for vomiting.   Neurological:  Negative for weakness and headaches.        Past Medical History:   Diagnosis Date    Helicobacter pylori ab+ 2018    Hypertension     Migraine without status migrainosus, not intractable, unspecified migraine type 2022    Obesity     Uterine fibroid          Objective   Physical Exam  Constitutional:       Appearance: She is well-groomed.   HENT:      Head: Normocephalic and atraumatic.   Eyes:

## 2024-04-04 NOTE — TELEPHONE ENCOUNTER
LVM to attempt to schedule an appt with Dr. Gurrola for \"Believe to have a virus , had Covid 19 a week ago had diarrhea ever since\"

## 2024-04-08 ENCOUNTER — TELEPHONE (OUTPATIENT)
Age: 44
End: 2024-04-08

## 2024-04-08 NOTE — TELEPHONE ENCOUNTER
Patient is wanting for a new referral to be written for her to have an Ablation or Partial Hysterectomy.     Office she was referred to prior does not accept her insurance. Please advise.

## 2024-04-08 NOTE — TELEPHONE ENCOUNTER
Per Dr. Berumen pt advised to contact her insurance company for a list of providers in her area that accept her insurance.  Pt verbalized understanding.

## 2024-04-15 DIAGNOSIS — R19.7 DIARRHEA OF PRESUMED INFECTIOUS ORIGIN: ICD-10-CM

## 2024-04-16 ENCOUNTER — OFFICE VISIT (OUTPATIENT)
Age: 44
End: 2024-04-16
Payer: COMMERCIAL

## 2024-04-16 VITALS — WEIGHT: 244 LBS | DIASTOLIC BLOOD PRESSURE: 80 MMHG | SYSTOLIC BLOOD PRESSURE: 122 MMHG | BODY MASS INDEX: 34.03 KG/M2

## 2024-04-16 DIAGNOSIS — D25.0 INTRAMURAL, SUBMUCOUS, AND SUBSEROUS LEIOMYOMA OF UTERUS: Primary | ICD-10-CM

## 2024-04-16 DIAGNOSIS — D25.2 INTRAMURAL, SUBMUCOUS, AND SUBSEROUS LEIOMYOMA OF UTERUS: Primary | ICD-10-CM

## 2024-04-16 DIAGNOSIS — D25.1 INTRAMURAL, SUBMUCOUS, AND SUBSEROUS LEIOMYOMA OF UTERUS: Primary | ICD-10-CM

## 2024-04-16 DIAGNOSIS — N92.4 EXCESSIVE BLEEDING IN PREMENOPAUSAL PERIOD: ICD-10-CM

## 2024-04-16 PROCEDURE — 3074F SYST BP LT 130 MM HG: CPT | Performed by: OBSTETRICS & GYNECOLOGY

## 2024-04-16 PROCEDURE — 99214 OFFICE O/P EST MOD 30 MIN: CPT | Performed by: OBSTETRICS & GYNECOLOGY

## 2024-04-16 PROCEDURE — 3079F DIAST BP 80-89 MM HG: CPT | Performed by: OBSTETRICS & GYNECOLOGY

## 2024-04-16 NOTE — PROGRESS NOTES
Earnestine Tavares is a 43 y.o. female presents for a problem visit.    Chief Complaint   Patient presents with    Other     Consult for fibroid treatment options     Patient's last menstrual period was 04/07/2024 (exact date).  Birth Control: none.  Last Pap: normal obtained 03/27/23     Pt would like to discuss treatment options for fibroids- hysterectomy vs ablation, or other options.  Pt stopped taking her ocp last month, in the middle of the pill pack, and had a withdrawal bleed on 04/07/24.        1. Have you been to the ER, urgent care clinic, or hospitalized since your last visit? No    2. Have you seen or consulted any other health care providers outside of the [facility] since your last visit? No    Examination chaperoned by ALEENA TAMEZ LPN.

## 2024-04-23 ENCOUNTER — PREP FOR PROCEDURE (OUTPATIENT)
Facility: HOSPITAL | Age: 44
End: 2024-04-23

## 2024-04-23 PROBLEM — N92.0 MENORRHAGIA: Status: ACTIVE | Noted: 2024-04-23

## 2024-04-25 ENCOUNTER — PREP FOR PROCEDURE (OUTPATIENT)
Facility: HOSPITAL | Age: 44
End: 2024-04-25

## 2024-04-25 DIAGNOSIS — N92.0 MENORRHAGIA WITH REGULAR CYCLE: Primary | ICD-10-CM

## 2024-05-22 NOTE — PERIOP NOTE
[facility]  [address] [place] [address]  [phone]    PRE-ADMISSION TESTING DEPT [phone]     Surgery Date:   05/24/2024      [facility] staff will call you between 4 and 7 pm the day before your surgery with your arrival time. Call [phone] after 7 pm Mon-Fri if you did not receive this call.  If your surgeon's office has given you an arrival time, follow those instructions.    INSTRUCTIONS BEFORE YOUR SURGERY   When You  Arrive Please proceed to the Patient Access Office, just inside the Main Entrance of the hospital.  Have your insurance card, photo ID, and any copayment (if needed)  Bring Advance Directive/Living Will/POA forms (if applicable)   Food   and   Drink NO food or drink after midnight the night before surgery    This means NO water, gum, mints, coffee, juice, etc.  No alcohol for 24 hours before or after surgery   Medications to   TAKE   Morning of Surgery   Take ONLY these medications: NO MEDICATIONS ON THE MORNING OF SURGERY    Tylenol/acetaminophen products may be taken for pain, if needed   Medications  To  STOP before surgery STOP THESE 3 DAYS PRIOR TO SURGERY:  Non-Steroidal Anti-Inflammatory Drugs (NSAIDs):  Diclofenac, Ibuprofen (Advil, Motrin), Naproxen (Aleve)    STOP THESE TODAY:  Aspirin containing products (unless directed otherwise).  Herbal supplements, vitamins, and fish oil     Bathing Clothing  Jewelry  Valuables     Do not apply anything to your skin or face (lotions, creams, powders, or any makeup).   You may wear deodorant, unless having breast surgery.  Do not shave or trim any body area for 24 hours before surgery.  Leave money, valuables, and jewelry, including body piercings, at home.  Wear loose, comfortable clothes.  Wear glasses instead of contacts, bring a case for your glasses  No metal hair accessories.   Discharge Information If you are going home the day of surgery, you must have a responsible adult drive you home and stay with  you 24 hours after surgery.   Special Instructions If a situation occurs and you are delayed the day of surgery, call [phone] (Main OR) or [phone] (Ambulatory OR).    If your physical condition changes (like a fever, cold, flu, etc.) call your surgeon.       Preventing Infections Before and After - Your Surgery     IMPORTANT INSTRUCTIONS        You play an important role in your health and preparation for surgery. To reduce the germs on your skin you will need to shower with CHG soap (Chorhexidine gluconate 4%) two times before surgery.     CHG soap (Hibiclens, Hex-A-Clens or store brand)  purchase CHG soap at a pharmacy, grocery or department store.  You need to purchase TWO 4 ounce bottles to use for your 2 showers.     Shower with CHG soap 2 times before your surgery  The evening before your surgery  The morning of your surgery    Steps to follow:  Wash your hair with your normal shampoo and your body with regular soap and rinse well to remove shampoo and soap from your skin.  Wet a clean washcloth and turn off the shower.  Put CHG soap on washcloth and apply to your entire body from the neck down. Do not use on your head, face or private parts(genitals). Do not use CHG soap on open sores, wounds or areas of skin irritation.  Wash your body gently for 5 minutes. Do not wash your skin too hard. This soap does not create lather. Pay special attention to your underarms and from your belly button to your feet.  Turn the shower back on and rinse well to get CHG soap off your body.  Pat your skin dry with a clean, dry towel. Do not apply lotions or moisturizer.  Put on clean clothes and sleep on fresh bed sheets and do not allow pets to sleep with you.

## 2024-05-22 NOTE — PERIOP NOTE
PAT PHONE INTERVIEW COMPLETED. REVIEWED MED/SURG AND MEDICATION HX. INSTRUCTIONS EMAILED TO ADDRESS PROVIDED BY PT. OPPORTUNITY FOR QUESTIONS GIVEN.

## 2024-05-23 ENCOUNTER — ANESTHESIA EVENT (OUTPATIENT)
Facility: HOSPITAL | Age: 44
End: 2024-05-23
Payer: MEDICAID

## 2024-05-24 ENCOUNTER — ANESTHESIA (OUTPATIENT)
Facility: HOSPITAL | Age: 44
End: 2024-05-24
Payer: MEDICAID

## 2024-05-24 ENCOUNTER — HOSPITAL ENCOUNTER (OUTPATIENT)
Facility: HOSPITAL | Age: 44
Setting detail: OUTPATIENT SURGERY
Discharge: HOME HEALTH | End: 2024-05-24
Attending: OBSTETRICS & GYNECOLOGY | Admitting: OBSTETRICS & GYNECOLOGY
Payer: MEDICAID

## 2024-05-24 VITALS
HEIGHT: 71 IN | RESPIRATION RATE: 14 BRPM | HEART RATE: 68 BPM | SYSTOLIC BLOOD PRESSURE: 115 MMHG | OXYGEN SATURATION: 98 % | DIASTOLIC BLOOD PRESSURE: 79 MMHG | TEMPERATURE: 98.7 F | WEIGHT: 240 LBS | BODY MASS INDEX: 33.6 KG/M2

## 2024-05-24 DIAGNOSIS — J30.1 SEASONAL ALLERGIC RHINITIS DUE TO POLLEN: ICD-10-CM

## 2024-05-24 DIAGNOSIS — N92.0 MENORRHAGIA WITH REGULAR CYCLE: ICD-10-CM

## 2024-05-24 LAB — HCG UR QL: NEGATIVE

## 2024-05-24 PROCEDURE — 6360000002 HC RX W HCPCS

## 2024-05-24 PROCEDURE — 7100000000 HC PACU RECOVERY - FIRST 15 MIN: Performed by: OBSTETRICS & GYNECOLOGY

## 2024-05-24 PROCEDURE — 2580000003 HC RX 258: Performed by: OBSTETRICS & GYNECOLOGY

## 2024-05-24 PROCEDURE — 2709999900 HC NON-CHARGEABLE SUPPLY: Performed by: OBSTETRICS & GYNECOLOGY

## 2024-05-24 PROCEDURE — 81025 URINE PREGNANCY TEST: CPT

## 2024-05-24 PROCEDURE — 2720000010 HC SURG SUPPLY STERILE: Performed by: OBSTETRICS & GYNECOLOGY

## 2024-05-24 PROCEDURE — 88305 TISSUE EXAM BY PATHOLOGIST: CPT

## 2024-05-24 PROCEDURE — 6360000002 HC RX W HCPCS: Performed by: OBSTETRICS & GYNECOLOGY

## 2024-05-24 PROCEDURE — 6370000000 HC RX 637 (ALT 250 FOR IP): Performed by: ANESTHESIOLOGY

## 2024-05-24 PROCEDURE — 88342 IMHCHEM/IMCYTCHM 1ST ANTB: CPT

## 2024-05-24 PROCEDURE — 3600000014 HC SURGERY LEVEL 4 ADDTL 15MIN: Performed by: OBSTETRICS & GYNECOLOGY

## 2024-05-24 PROCEDURE — 3700000000 HC ANESTHESIA ATTENDED CARE: Performed by: OBSTETRICS & GYNECOLOGY

## 2024-05-24 PROCEDURE — 3700000001 HC ADD 15 MINUTES (ANESTHESIA): Performed by: OBSTETRICS & GYNECOLOGY

## 2024-05-24 PROCEDURE — 7100000010 HC PHASE II RECOVERY - FIRST 15 MIN: Performed by: OBSTETRICS & GYNECOLOGY

## 2024-05-24 PROCEDURE — 7100000001 HC PACU RECOVERY - ADDTL 15 MIN: Performed by: OBSTETRICS & GYNECOLOGY

## 2024-05-24 PROCEDURE — 7100000011 HC PHASE II RECOVERY - ADDTL 15 MIN: Performed by: OBSTETRICS & GYNECOLOGY

## 2024-05-24 PROCEDURE — 2500000003 HC RX 250 WO HCPCS

## 2024-05-24 PROCEDURE — 2580000003 HC RX 258: Performed by: ANESTHESIOLOGY

## 2024-05-24 PROCEDURE — 3600000004 HC SURGERY LEVEL 4 BASE: Performed by: OBSTETRICS & GYNECOLOGY

## 2024-05-24 RX ORDER — SODIUM CHLORIDE 0.9 % (FLUSH) 0.9 %
5-40 SYRINGE (ML) INJECTION PRN
Status: DISCONTINUED | OUTPATIENT
Start: 2024-05-24 | End: 2024-05-24 | Stop reason: HOSPADM

## 2024-05-24 RX ORDER — LORAZEPAM 2 MG/ML
0.5 INJECTION INTRAMUSCULAR
Status: DISCONTINUED | OUTPATIENT
Start: 2024-05-24 | End: 2024-05-24 | Stop reason: HOSPADM

## 2024-05-24 RX ORDER — OXYCODONE HYDROCHLORIDE 5 MG/1
5 TABLET ORAL
Status: COMPLETED | OUTPATIENT
Start: 2024-05-24 | End: 2024-05-24

## 2024-05-24 RX ORDER — HYDRALAZINE HYDROCHLORIDE 20 MG/ML
10 INJECTION INTRAMUSCULAR; INTRAVENOUS
Status: DISCONTINUED | OUTPATIENT
Start: 2024-05-24 | End: 2024-05-24 | Stop reason: HOSPADM

## 2024-05-24 RX ORDER — PROCHLORPERAZINE EDISYLATE 5 MG/ML
5 INJECTION INTRAMUSCULAR; INTRAVENOUS
Status: DISCONTINUED | OUTPATIENT
Start: 2024-05-24 | End: 2024-05-24 | Stop reason: HOSPADM

## 2024-05-24 RX ORDER — DEXAMETHASONE SODIUM PHOSPHATE 4 MG/ML
INJECTION, SOLUTION INTRA-ARTICULAR; INTRALESIONAL; INTRAMUSCULAR; INTRAVENOUS; SOFT TISSUE PRN
Status: DISCONTINUED | OUTPATIENT
Start: 2024-05-24 | End: 2024-05-24 | Stop reason: SDUPTHER

## 2024-05-24 RX ORDER — SODIUM CHLORIDE, SODIUM LACTATE, POTASSIUM CHLORIDE, CALCIUM CHLORIDE 600; 310; 30; 20 MG/100ML; MG/100ML; MG/100ML; MG/100ML
INJECTION, SOLUTION INTRAVENOUS CONTINUOUS
Status: DISCONTINUED | OUTPATIENT
Start: 2024-05-24 | End: 2024-05-24 | Stop reason: HOSPADM

## 2024-05-24 RX ORDER — MIDAZOLAM HYDROCHLORIDE 1 MG/ML
INJECTION INTRAMUSCULAR; INTRAVENOUS PRN
Status: DISCONTINUED | OUTPATIENT
Start: 2024-05-24 | End: 2024-05-24 | Stop reason: SDUPTHER

## 2024-05-24 RX ORDER — SODIUM CHLORIDE 9 MG/ML
INJECTION, SOLUTION INTRAVENOUS PRN
Status: DISCONTINUED | OUTPATIENT
Start: 2024-05-24 | End: 2024-05-24 | Stop reason: HOSPADM

## 2024-05-24 RX ORDER — ACETAMINOPHEN 500 MG
1000 TABLET ORAL ONCE
Status: COMPLETED | OUTPATIENT
Start: 2024-05-24 | End: 2024-05-24

## 2024-05-24 RX ORDER — FENTANYL CITRATE 50 UG/ML
INJECTION, SOLUTION INTRAMUSCULAR; INTRAVENOUS PRN
Status: DISCONTINUED | OUTPATIENT
Start: 2024-05-24 | End: 2024-05-24 | Stop reason: SDUPTHER

## 2024-05-24 RX ORDER — LIDOCAINE HYDROCHLORIDE 20 MG/ML
INJECTION, SOLUTION EPIDURAL; INFILTRATION; INTRACAUDAL; PERINEURAL PRN
Status: DISCONTINUED | OUTPATIENT
Start: 2024-05-24 | End: 2024-05-24 | Stop reason: SDUPTHER

## 2024-05-24 RX ORDER — FENTANYL CITRATE 50 UG/ML
25 INJECTION, SOLUTION INTRAMUSCULAR; INTRAVENOUS EVERY 5 MIN PRN
Status: DISCONTINUED | OUTPATIENT
Start: 2024-05-24 | End: 2024-05-24 | Stop reason: HOSPADM

## 2024-05-24 RX ORDER — HYDROMORPHONE HYDROCHLORIDE 1 MG/ML
0.5 INJECTION, SOLUTION INTRAMUSCULAR; INTRAVENOUS; SUBCUTANEOUS EVERY 5 MIN PRN
Status: DISCONTINUED | OUTPATIENT
Start: 2024-05-24 | End: 2024-05-24 | Stop reason: HOSPADM

## 2024-05-24 RX ORDER — NALOXONE HYDROCHLORIDE 0.4 MG/ML
INJECTION, SOLUTION INTRAMUSCULAR; INTRAVENOUS; SUBCUTANEOUS PRN
Status: DISCONTINUED | OUTPATIENT
Start: 2024-05-24 | End: 2024-05-24 | Stop reason: HOSPADM

## 2024-05-24 RX ORDER — ONDANSETRON 2 MG/ML
INJECTION INTRAMUSCULAR; INTRAVENOUS PRN
Status: DISCONTINUED | OUTPATIENT
Start: 2024-05-24 | End: 2024-05-24 | Stop reason: SDUPTHER

## 2024-05-24 RX ORDER — LIDOCAINE HYDROCHLORIDE 10 MG/ML
1 INJECTION, SOLUTION EPIDURAL; INFILTRATION; INTRACAUDAL; PERINEURAL
Status: DISCONTINUED | OUTPATIENT
Start: 2024-05-24 | End: 2024-05-24 | Stop reason: HOSPADM

## 2024-05-24 RX ORDER — FENTANYL CITRATE 50 UG/ML
100 INJECTION, SOLUTION INTRAMUSCULAR; INTRAVENOUS
Status: DISCONTINUED | OUTPATIENT
Start: 2024-05-24 | End: 2024-05-24 | Stop reason: HOSPADM

## 2024-05-24 RX ORDER — NORETHINDRONE ACETATE AND ETHINYL ESTRADIOL .03; 1.5 MG/1; MG/1
1 TABLET ORAL DAILY
Status: ON HOLD | COMMUNITY
End: 2024-05-24 | Stop reason: HOSPADM

## 2024-05-24 RX ORDER — MIDAZOLAM HYDROCHLORIDE 2 MG/2ML
2 INJECTION, SOLUTION INTRAMUSCULAR; INTRAVENOUS
Status: DISCONTINUED | OUTPATIENT
Start: 2024-05-24 | End: 2024-05-24 | Stop reason: HOSPADM

## 2024-05-24 RX ORDER — SODIUM CHLORIDE 0.9 % (FLUSH) 0.9 %
5-40 SYRINGE (ML) INJECTION EVERY 12 HOURS SCHEDULED
Status: DISCONTINUED | OUTPATIENT
Start: 2024-05-24 | End: 2024-05-24 | Stop reason: HOSPADM

## 2024-05-24 RX ORDER — IBUPROFEN 600 MG/1
600 TABLET ORAL 4 TIMES DAILY PRN
Qty: 40 TABLET | Refills: 0 | Status: SHIPPED | OUTPATIENT
Start: 2024-05-24

## 2024-05-24 RX ORDER — DIPHENHYDRAMINE HYDROCHLORIDE 50 MG/ML
12.5 INJECTION INTRAMUSCULAR; INTRAVENOUS
Status: DISCONTINUED | OUTPATIENT
Start: 2024-05-24 | End: 2024-05-24 | Stop reason: HOSPADM

## 2024-05-24 RX ORDER — IPRATROPIUM BROMIDE AND ALBUTEROL SULFATE 2.5; .5 MG/3ML; MG/3ML
1 SOLUTION RESPIRATORY (INHALATION)
Status: DISCONTINUED | OUTPATIENT
Start: 2024-05-24 | End: 2024-05-24 | Stop reason: HOSPADM

## 2024-05-24 RX ORDER — METRONIDAZOLE 500 MG/1
500 TABLET ORAL 2 TIMES DAILY
Qty: 14 TABLET | Refills: 0 | Status: SHIPPED | OUTPATIENT
Start: 2024-05-24 | End: 2024-05-31

## 2024-05-24 RX ORDER — ONDANSETRON 2 MG/ML
4 INJECTION INTRAMUSCULAR; INTRAVENOUS
Status: DISCONTINUED | OUTPATIENT
Start: 2024-05-24 | End: 2024-05-24 | Stop reason: HOSPADM

## 2024-05-24 RX ADMIN — MIDAZOLAM 2 MG: 1 INJECTION INTRAMUSCULAR; INTRAVENOUS at 12:01

## 2024-05-24 RX ADMIN — SODIUM CHLORIDE, POTASSIUM CHLORIDE, SODIUM LACTATE AND CALCIUM CHLORIDE: 600; 310; 30; 20 INJECTION, SOLUTION INTRAVENOUS at 09:33

## 2024-05-24 RX ADMIN — FENTANYL CITRATE 25 MCG: 50 INJECTION, SOLUTION INTRAMUSCULAR; INTRAVENOUS at 12:36

## 2024-05-24 RX ADMIN — ACETAMINOPHEN 1000 MG: 500 TABLET ORAL at 09:22

## 2024-05-24 RX ADMIN — FENTANYL CITRATE 25 MCG: 50 INJECTION, SOLUTION INTRAMUSCULAR; INTRAVENOUS at 12:29

## 2024-05-24 RX ADMIN — ONDANSETRON 4 MG: 2 INJECTION INTRAMUSCULAR; INTRAVENOUS at 12:38

## 2024-05-24 RX ADMIN — FENTANYL CITRATE 50 MCG: 50 INJECTION, SOLUTION INTRAMUSCULAR; INTRAVENOUS at 12:19

## 2024-05-24 RX ADMIN — WATER 2000 MG: 1 INJECTION INTRAMUSCULAR; INTRAVENOUS; SUBCUTANEOUS at 12:17

## 2024-05-24 RX ADMIN — LIDOCAINE HYDROCHLORIDE 100 MG: 20 INJECTION, SOLUTION EPIDURAL; INFILTRATION; INTRACAUDAL; PERINEURAL at 12:08

## 2024-05-24 RX ADMIN — DEXAMETHASONE SODIUM PHOSPHATE 4 MG: 4 INJECTION INTRA-ARTICULAR; INTRALESIONAL; INTRAMUSCULAR; INTRAVENOUS; SOFT TISSUE at 12:17

## 2024-05-24 RX ADMIN — OXYCODONE 5 MG: 5 TABLET ORAL at 13:06

## 2024-05-24 RX ADMIN — PROPOFOL 200 MG: 10 INJECTION, EMULSION INTRAVENOUS at 12:08

## 2024-05-24 ASSESSMENT — ENCOUNTER SYMPTOMS
ABDOMINAL DISTENTION: 0
CHEST TIGHTNESS: 0
WHEEZING: 0
SHORTNESS OF BREATH: 0
NAUSEA: 0
VOMITING: 0

## 2024-05-24 ASSESSMENT — PAIN - FUNCTIONAL ASSESSMENT
PAIN_FUNCTIONAL_ASSESSMENT: 0-10
PAIN_FUNCTIONAL_ASSESSMENT: 0-10

## 2024-05-24 ASSESSMENT — PAIN SCALES - GENERAL
PAINLEVEL_OUTOF10: 3
PAINLEVEL_OUTOF10: 3
PAINLEVEL_OUTOF10: 0

## 2024-05-24 ASSESSMENT — PAIN DESCRIPTION - ORIENTATION: ORIENTATION: LOWER

## 2024-05-24 ASSESSMENT — PAIN DESCRIPTION - DESCRIPTORS
DESCRIPTORS: CRAMPING
DESCRIPTORS: CRAMPING

## 2024-05-24 ASSESSMENT — PAIN DESCRIPTION - LOCATION: LOCATION: ABDOMEN

## 2024-05-24 NOTE — BRIEF OP NOTE
Brief Op Note    Date of Surgery: 05/24/24     Pre Operative Diagnosis/indication: menorrhagia, fibroid uterus    Post Operative Diagnosis: Pre-Op Diagnosis Codes:     * Menorrhagia [N92.0]  plus intracavitary polyp    Surgeon: Dr. Berumen    Assistant: none    Procedure Performed: hysteroscopy, D+C, polypectomy and novasure endometrial ablation    Anesthesia: general via LMA    EBL: 10 cc    QBL: pending cc    IVF: 200 cc    UOP: pending cc    Findings: Uterus sounded to 8 cm. Dilated to #6 anjelica dilator. Hysteroscopic fluid normal saline. Fluid deficit: 115 cc. Hysteroscopic finding: bilateral tubal ostia noted and a fundal polyp noted. Novasure setting: cavity length: 5.5 cm, width: 4.0 cm. Time: 54 seconds.     Specimen: endometrial samplings    Implant: none    Complications: none apparent    Condition Following Surgery: Stable to RR    Chino Berumen MD    Dictation Number: [id number]

## 2024-05-24 NOTE — OP NOTE
[address]  [address]                            OPERATIVE REPORT      PATIENT NAME: KAEL FISHER               : 1980  MED REC NO: [id number]                       ROOM: OR  ACCOUNT NO: [id number]                       ADMIT DATE: 2024  PROVIDER: Chino Berumen MD    DATE OF SERVICE:  2024    PREOPERATIVE DIAGNOSES:  Menorrhagia and uterine fibroids.    POSTOPERATIVE DIAGNOSES:  Menorrhagia, uterine fibroids, and intracavitary polyp.    PROCEDURES PERFORMED:  Hysteroscopy, dilation and curettage, polypectomy, and NovaSure endometrial ablation.    SURGEON:  Chino Berumen MD    ASSISTANT:  None.    ANESTHESIA:  General anesthesia via LMA.    ESTIMATED BLOOD LOSS:  10 mL.    SPECIMENS REMOVED:  Endometrial sampling.    INTRAOPERATIVE FINDINGS:  Uterus sounded to 8 cm, dilated to #6 Hegar dilator.  Hysteroscopic fluid was normal saline.  Fluid deficit was 115 mL.  Hysteroscopic findings showed bilateral tubal ostia and a fundal polyp, this was resected.  NovaSure settings, cavity length 5.5 cm, cavity width 4 cm, time of procedure for ablation 54 seconds.     COMPLICATIONS:  None apparent.    IMPLANTS:  None.    INDICATIONS:  Please see preoperative diagnosis.    DESCRIPTION OF PROCEDURE:  The patient was taken to the operating room and placed on the operating table in supine position, had an LMA placed and received general anesthesia.  She then had her legs placed in Frank stirrups and she was prepped and draped in the usual fashion.  After a time-out, speculum was inserted in the vagina.  Cervix was visualized.  Anterior lip of the cervix was grasped with a single-tooth tenaculum.  Time-out was performed.  Cervix was then serially dilated to #6 Hegar dilator.  The hysteroscope was introduced.  The uterine cavity was distended with normal saline with the above-noted findings.  The MyoSure REACH was introduced.  The  polyp was resected.  The hysteroscope and MyoSure were removed.  Sharp curette was introduced and sharp curetting was performed until uterine cry was noted throughout.  Specimen was collected and sent to Pathology.  The uterus was resounded using the NovaSure sound device to determine cavity length of 5.5 cm.  The NovaSure system was then introduced to the uterus via the cervix and deployed.  Cavity width was 4 cm.  Cavity assessment was passed.  The machine was enabled and the endometrial ablation was performed for 54 seconds.  At the end of that time, NovaSure system was then removed from the cervix.  Tenaculum was removed from the cervix.  Speculum was removed.  The patient had her legs taken out of stirrups.  Anesthesia was discontinued.  She was taken to the recovery room in stable condition.  All counts were correct.    QBL:  Pending.    IV FLUIDS:  200 mL.    URINE OUTPUT:  Pending.    CONDITION:  Following surgery, stable to recovery room.        ADELFO KELLER MD      SJC/AQS  D:  05/24/2024 12:46:50  T:  05/24/2024 15:01:41  JOB #:  [id number]

## 2024-05-24 NOTE — DISCHARGE SUMMARY
Discharge Summary    Date: 5/24/2024  Patient Name: Earnestine Tavares    YOB: 1980     Age: 43 y.o.    Admit Date: 5/24/2024  Discharge Date: 5/24/2024  Discharge Condition: Stable    Admission Diagnosis  Menorrhagia [N92.0]      Discharge Diagnosis  Principal Problem:    Menorrhagia  Resolved Problems:    * No resolved hospital problems. *      Hospital Stay  Narrative of Hospital Course:  Pt admitted for surgery. Under went hysteroscopy, D+C, polypectomy and novasure endometrial ablation.     Consultants:  None    Surgeries/procedures Performed:      Treatments:    Analgesia and Surgery        Discharge Plan/Disposition:  Home    Hospital/Incidental Findings Requiring Follow Up:    Patient Instructions:    Diet:    Activity:Activity as Tolerated, Activiity as Tolerated, No Driving for Today and No Sex for  For number of days (if applicable): 4      Other Instructions: Pt may return to work on 5/29/24.     Provider Follow-Up:   No follow-ups on file.     Significant Diagnostic Studies:    Recent Labs:  Admission on 05/24/2024  Preg Test, Ur                                 Date: 05/24/2024  Value: Negative    Ref range: NEG                Status: Final  ------------    Radiology last 7 days:  No results found.     [unfilled]    Discharge Medications    Current Discharge Medication List        Current Discharge Medication List        Current Discharge Medication List    CONTINUE these medications which have NOT CHANGED    Norethindrone Acet-Ethinyl Est (MICROGESTIN) 1.5-30 MG-MCG TABS  Take 1 tablet by mouth daily    amLODIPine (NORVASC) 10 MG tablet  Take 1 tablet by mouth daily  Qty: 90 tablet Refills: 1  Associated Diagnoses:Essential hypertension    fluticasone (FLONASE) 50 MCG/ACT nasal spray  2 sprays by Nasal route daily  Qty: 16 g Refills: 1  Associated Diagnoses:Seasonal allergic rhinitis due to pollen    hydroCHLOROthiazide (HYDRODIURIL) 25 MG tablet  Take 1 tablet by mouth daily  Qty: 90

## 2024-05-24 NOTE — H&P
Surgery H+P      Chief Complaint: Surgery    HPI:     Pt presents to [facility] today for hysteroscopy, D+C, and Endometrial ablation to address menorrhagia. Currently the patient is without complaints. Treatment options have been discussed previously and questions answered.       Past Medical History:    Past Medical History:   Diagnosis Date    GERD (gastroesophageal reflux disease)     Helicobacter pylori ab+ 05/21/2018    Hypertension     Migraine without status migrainosus, not intractable, unspecified migraine type 02/23/2022    Obesity     Uterine fibroid      Past Surgical History:   Procedure Laterality Date    ABDOMINOPLASTY      BREAST ENHANCEMENT SURGERY Bilateral     silicone    CARPAL TUNNEL RELEASE      IR CHOLECYSTOSTOMY PERCUTANEOUS COMPLETE      LIPOSUCTION      back, arms    TUBAL LIGATION  2000    US ASP BREAST CYST LEFT Left 04/11/2022    US BREAST CYST ASPIRATION LEFT 4/11/2022 [facility] RAD MAMMO       Current Facility-Administered Medications   Medication Dose Route Frequency Provider Last Rate Last Admin    lidocaine PF 1 % injection 1 mL  1 mL IntraDERmal Once PRN Dominik Padilla MD        fentaNYL (SUBLIMAZE) injection 100 mcg  100 mcg IntraVENous Once PRN Dominik Padilla MD        lactated ringers IV soln infusion   IntraVENous Continuous Dominik Padilla  mL/hr at 05/24/24 0933 New Bag at 05/24/24 0933    sodium chloride flush 0.9 % injection 5-40 mL  5-40 mL IntraVENous 2 times per day Dominik Padilla MD        sodium chloride flush 0.9 % injection 5-40 mL  5-40 mL IntraVENous PRN Dominik Padilla MD        0.9 % sodium chloride infusion   IntraVENous PRN Dominik Padilla MD        midazolam PF (VERSED) injection 2 mg  2 mg IntraVENous Once PRN Dominik Padilla MD        ceFAZolin (ANCEF) 2,000 mg in sterile water 20 mL IV syringe  2,000 mg IntraVENous On Call to OR Chino Berumen MD         Allergies: Patient has no known allergies.     Tobacco History:  reports that

## 2024-05-24 NOTE — ANESTHESIA PRE PROCEDURE
Department of Anesthesiology  Preprocedure Note       Name:  Earnestine Tavares   Age:  43 y.o.  :  1980                                          MRN:  [id number]         Date:  2024      Surgeon: Surgeon(s):  Chino Berumen MD    Procedure: Procedure(s):  HYSTEROSCOPY DILATATION AND CURETTAGE WITH ENDOMETRIAL ABLATION    Medications prior to admission:   Prior to Admission medications    Medication Sig Start Date End Date Taking? Authorizing Provider   Norethindrone Acet-Ethinyl Est (MICROGESTIN) 1.5-30 MG-MCG TABS Take 1 tablet by mouth daily   Yes Provider, MD Terri   amLODIPine (NORVASC) 10 MG tablet Take 1 tablet by mouth daily  Patient taking differently: Take 1 tablet by mouth every morning 24   Fortino Gurrola DO   fluticasone (FLONASE) 50 MCG/ACT nasal spray 2 sprays by Nasal route daily  Patient taking differently: 2 sprays by Nasal route daily as needed 24   Fortino Gurrola DO   hydroCHLOROthiazide (HYDRODIURIL) 25 MG tablet Take 1 tablet by mouth daily  Patient taking differently: Take 1 tablet by mouth every morning 24   Fortino Gurrola DO       Current medications:    Current Facility-Administered Medications   Medication Dose Route Frequency Provider Last Rate Last Admin   • lidocaine PF 1 % injection 1 mL  1 mL IntraDERmal Once PRN Dominik Padilla MD       • fentaNYL (SUBLIMAZE) injection 100 mcg  100 mcg IntraVENous Once PRN Dominik Padilla MD       • lactated ringers IV soln infusion   IntraVENous Continuous Dominik Paidlla  mL/hr at 24 0933 New Bag at 24 0933   • sodium chloride flush 0.9 % injection 5-40 mL  5-40 mL IntraVENous 2 times per day Dominik Padilla MD       • sodium chloride flush 0.9 % injection 5-40 mL  5-40 mL IntraVENous PRN Dominik Padilla MD       • 0.9 % sodium chloride infusion   IntraVENous PRN Dominik Padilla MD       • midazolam PF (VERSED) injection 2 mg  2 mg IntraVENous Once PRN Dominik Padilla MD       •

## 2024-05-24 NOTE — DISCHARGE INSTRUCTIONS
______________________________________________________________________    Anesthesia Discharge Instructions    After general anesthesia or intervenous sedation, for 24 hours or while taking prescription Narcotics:  Limit your activities  Do not drive or operate hazardous machinery  If you have not urinated within 8 hours after discharge, please contact your surgeon on call.  Do not make important personal or business decisions  Do not drink alcoholic beverages    Report the following to your surgeon:  Excessive pain, swelling, redness or odor of or around the surgical area  Temperature over 100.5 degrees  Nausea and vomiting lasting longer than 4 hours or if unable to take medication  Any signs of decreased circulation or nerve impairment to extremity:  Change in color, persistent numbness, tingling, coldness or increased pain.  Any questions      ***You received 1 gm of tylenol at 9:22 am. You may take tylenol/tylenol containing products again at 03:22 pm if needed.  You received Oxycodone 5mg at 1:00pm.

## 2024-05-24 NOTE — ANESTHESIA POSTPROCEDURE EVALUATION
Post-Anesthesia Evaluation and Assessment    Patient: Earnestine Tavares MRN: [id number]  SSN: [id number]    YOB: 1980  Age: 43 y.o.  Sex: female      I have evaluated the patient and they are stable and ready for discharge from the PACU.     Cardiovascular Function/Vital Signs  Visit Vitals  /79   Pulse 68   Temp 98.7 °F (37.1 °C) (Oral)   Resp 14   Ht 1.803 m (5' 11\")   Wt 108.9 kg (240 lb)   SpO2 98%   BMI 33.47 kg/m²       Patient is status post General anesthesia for Procedure(s):  HYSTEROSCOPY DILATATION AND CURETTAGE, polypectomy WITH novasure ENDOMETRIAL ABLATION.    Nausea/Vomiting: None    Postoperative hydration reviewed and adequate.    Pain:      Managed    Neurological Status:       At baseline    Mental Status, Level of Consciousness: Alert and  oriented to person, place, and time    Pulmonary Status:       Adequate oxygenation and airway patent    Complications related to anesthesia: None    Post-anesthesia assessment completed. No concerns    Signed By: Dominik Padilla MD     May 24, 2024            Department of Anesthesiology  Postprocedure Note    Patient: Earnestine Tavares  MRN: [id number]  YOB: 1980  Date of evaluation: 5/24/2024    Procedure Summary       Date: 05/24/24 Room / Location: [facility] MAIN OR [address] [place] [address] MAIN OR    Anesthesia Start: 1201 Anesthesia Stop: 1254    Procedure: HYSTEROSCOPY DILATATION AND CURETTAGE, polypectomy WITH novasure ENDOMETRIAL ABLATION (Perineum) Diagnosis:       Menorrhagia      (Menorrhagia [N92.0])    Providers: Chino Berumen MD Responsible Provider: Dominik Padilla MD    Anesthesia Type: General ASA Status: 2            Anesthesia Type: General    Sobia Phase I: Osbia Score: 10    Sobia Phase II: Sobia Score: 10    Anesthesia Post Evaluation    No notable events documented.

## 2024-05-28 ENCOUNTER — TELEPHONE (OUTPATIENT)
Age: 44
End: 2024-05-28

## 2024-05-28 NOTE — TELEPHONE ENCOUNTER
Patient called in needing a note releasing her back to work after her surgery last week. She works out of town and is unable to  the note today, so she is wondering if it could be sent to her mychart? Please advise.

## 2024-05-29 NOTE — TELEPHONE ENCOUNTER
Reviewed Ata's notes, and pt able to return to work on 5/29. Will give pt work note for.     Gloria Ramon, MICAH - SARAH

## 2024-06-17 ENCOUNTER — OFFICE VISIT (OUTPATIENT)
Age: 44
End: 2024-06-17

## 2024-06-17 VITALS — BODY MASS INDEX: 34.59 KG/M2 | SYSTOLIC BLOOD PRESSURE: 124 MMHG | WEIGHT: 248 LBS | DIASTOLIC BLOOD PRESSURE: 76 MMHG

## 2024-06-17 DIAGNOSIS — Z09 POSTOP CHECK: Primary | ICD-10-CM

## 2024-06-17 PROBLEM — N93.9 ABNORMAL UTERINE BLEEDING (AUB): Status: RESOLVED | Noted: 2023-07-13 | Resolved: 2024-06-17

## 2024-06-17 PROBLEM — N92.0 MENORRHAGIA: Status: RESOLVED | Noted: 2024-04-23 | Resolved: 2024-06-17

## 2024-06-17 PROBLEM — N94.6 DYSMENORRHEA: Status: RESOLVED | Noted: 2023-07-13 | Resolved: 2024-06-17

## 2024-06-17 PROBLEM — E66.01 SEVERE OBESITY (HCC): Status: RESOLVED | Noted: 2019-06-27 | Resolved: 2024-06-17

## 2024-06-17 PROCEDURE — 99024 POSTOP FOLLOW-UP VISIT: CPT | Performed by: OBSTETRICS & GYNECOLOGY

## 2024-06-17 NOTE — PROGRESS NOTES
Earnestine Tavares is a 43 y.o. female presents for a follow up visit.    Chief Complaint   Patient presents with    Follow-up     Post-op     Patient's last menstrual period was 05/19/2024.    The patient is reporting having: no problems since surgery        1. Have you been to the ER, urgent care clinic, or hospitalized since your last visit? No    2. Have you seen or consulted any other health care providers outside of the [facility] since your last visit? No      
normal    Neck  Inspection: normal appearance    Skin  General Inspection: no rash, no lesions identified    Neurologic/Psychiatric  Mental Status:  Orientation: grossly oriented to person, place and time  Mood and Affect: mood normal, affect appropriate    Assessment/Plan:    Post operative appointment without problems.  Discussed pathology report findings with patient. [facility] with cystoscopy given the pathology results of a small area of complex hyperplasia with atypia in the polyp removed at the time of her endometrial ablation. Case was reviewed with Dr Mac, GYN/ONC. Patient is in agreement and would like to schedule asap due to her work schedule. Orders sent.     Chino Berumen MD

## 2024-06-18 ENCOUNTER — PREP FOR PROCEDURE (OUTPATIENT)
Facility: HOSPITAL | Age: 44
End: 2024-06-18

## 2024-06-18 DIAGNOSIS — N85.01 COMPLEX ENDOMETRIAL HYPERPLASIA: ICD-10-CM

## 2024-06-19 ENCOUNTER — PREP FOR PROCEDURE (OUTPATIENT)
Facility: HOSPITAL | Age: 44
End: 2024-06-19

## 2024-06-25 ENCOUNTER — PREP FOR PROCEDURE (OUTPATIENT)
Facility: HOSPITAL | Age: 44
End: 2024-06-25

## 2024-06-25 DIAGNOSIS — N85.00 ENDOMETRIAL HYPERPLASIA: Primary | ICD-10-CM

## 2024-07-03 RX ORDER — VITAMIN E 268 MG
400 CAPSULE ORAL DAILY
COMMUNITY

## 2024-07-03 RX ORDER — ASCORBIC ACID 500 MG
500 TABLET ORAL DAILY
COMMUNITY

## 2024-07-03 NOTE — PERIOP NOTE
[address] [address]   MAIN OR                                  [phone]    MAIN PRE OP             [phone]                                                                                AMBULATORY PRE OP          [phone]  PRE-ADMISSION TESTING    [phone]     Surgery Date:  7/12/24       Is surgery arrival time given by surgeon?  YES  NO    If “NO”, [facility] staff will call you between 4 and 7pm the day before your surgery with your arrival time. (If your surgery is on a Monday, we will call you the Friday before.)    Call [phone] after 7pm Monday-Friday if you did not receive this call.    INSTRUCTIONS BEFORE YOUR SURGERY   When You  Arrive Arrive at [facility] Patient Access on 1st floor the day of your surgery.  Have your insurance card, photo ID, living will/advanced directive/POA (if applicable),  and any copayment (if needed)   Food   and   Drink NO food or drink after midnight the night before surgery    This means NO water, gum, mints, coffee, juice, etc.  No alcohol (beer, wine, liquor) or marijuana (smoking) 24 hours prior to surgery, or Marijuana edibles for 3 days prior to surgery.  Stop smoking cigarettes 14 days before surgery (helps w/healing and breathing).   Medications to   TAKE   Morning of Surgery MEDICATIONS TO TAKE THE MORNING OF SURGERY WITH A SIP OF WATER: AMLODIPINE     You may take these medications, IF NEEDED, the morning of surgery: INHALER IF NEEDED   Ask your surgeon/prescribing doctor for instructions on taking or stopping these medications prior to surgery:    Medications to STOP  before surgery Non-Steroidal anti-inflammatory Drugs (NSAID's): for example, Diclofenac (Voltaren), Ibuprofen (Advil, Motrin), Naproxen (Aleve) 3 days  STOP all herbal supplements and vitamins(unless prescribed by your doctor), and fish oil for 7 days  Other: STOP VITAMIN C, VITAMIN E AND MULTIVITAMIN ON  role in your health and preparation for surgery. To reduce the germs on your skin you will need to shower with CHG soap (Chorhexidine gluconate 4%) two times before surgery.    CHG soap (Hibiclens, Hex-A-Clens or store brand)  CHG soap will be provided at your Preadmission Testing (PAT) appointment.  If you do not have a PAT appointment before surgery, you may arrange to  CHG soap from our office or purchase CHG soap at a pharmacy, grocery or department store. You need to purchase TWO 4 ounce bottles to use for your 2 showers.    Steps to follow:  Wash your hair with your normal shampoo and your body with regular soap and rinse well to remove shampoo and soap from your skin.  Wet a clean washcloth and turn off the shower.  Put CHG soap on washcloth and apply to your entire body from the neck down. Do not use on your head, face or private parts(genitals). Do not use CHG soap on open sores, wounds or areas of skin irritation.  Wash your body gently for 5 minutes. Do not wash your skin too hard. This soap does not create lather. Pay special attention to your underarms and from your belly button to your feet.  Turn the shower back on and rinse well to get CHG soap off your body.  Pat your skin dry with a clean, dry towel. Do not apply lotions or moisturizer.  Put on clean clothes and sleep on fresh bed sheets and do not allow pets to sleep with you.    Shower with CHG soap 2 times before your surgery  The evening before your surgery  The morning of your surgery      Tips to help prevent infections after your surgery:  Protect your surgical wound from germs:  Hand washing is the most important thing you and your caregivers can do to prevent infections.  Keep your bandage clean and dry!  Do not touch your surgical wound.  Use clean, freshly washed towels and washcloths every time you shower; do not share bath linens with others.  Until your surgical wound is healed, wear clothing and sleep on bed linens that are

## 2024-07-12 ENCOUNTER — HOSPITAL ENCOUNTER (OUTPATIENT)
Facility: HOSPITAL | Age: 44
Setting detail: OUTPATIENT SURGERY
Discharge: HOME | End: 2024-07-12
Attending: OBSTETRICS & GYNECOLOGY | Admitting: OBSTETRICS & GYNECOLOGY
Payer: MEDICAID

## 2024-07-12 ENCOUNTER — ANESTHESIA (OUTPATIENT)
Facility: HOSPITAL | Age: 44
End: 2024-07-12
Payer: MEDICAID

## 2024-07-12 ENCOUNTER — ANESTHESIA EVENT (OUTPATIENT)
Facility: HOSPITAL | Age: 44
End: 2024-07-12
Payer: MEDICAID

## 2024-07-12 VITALS
HEART RATE: 65 BPM | OXYGEN SATURATION: 98 % | TEMPERATURE: 97.4 F | HEIGHT: 71 IN | WEIGHT: 245 LBS | DIASTOLIC BLOOD PRESSURE: 81 MMHG | BODY MASS INDEX: 34.3 KG/M2 | SYSTOLIC BLOOD PRESSURE: 120 MMHG | RESPIRATION RATE: 14 BRPM

## 2024-07-12 DIAGNOSIS — J30.1 SEASONAL ALLERGIC RHINITIS DUE TO POLLEN: ICD-10-CM

## 2024-07-12 DIAGNOSIS — Z90.710 S/P LAPAROSCOPIC HYSTERECTOMY: Primary | ICD-10-CM

## 2024-07-12 DIAGNOSIS — N85.00 ENDOMETRIAL HYPERPLASIA: ICD-10-CM

## 2024-07-12 LAB — HCG UR QL: NEGATIVE

## 2024-07-12 PROCEDURE — 2500000003 HC RX 250 WO HCPCS: Performed by: NURSE PRACTITIONER

## 2024-07-12 PROCEDURE — 3700000000 HC ANESTHESIA ATTENDED CARE: Performed by: OBSTETRICS & GYNECOLOGY

## 2024-07-12 PROCEDURE — 2580000003 HC RX 258: Performed by: NURSE PRACTITIONER

## 2024-07-12 PROCEDURE — 6360000002 HC RX W HCPCS: Performed by: OBSTETRICS & GYNECOLOGY

## 2024-07-12 PROCEDURE — 88331 PATH CONSLTJ SURG 1 BLK 1SPC: CPT

## 2024-07-12 PROCEDURE — 6360000002 HC RX W HCPCS: Performed by: ANESTHESIOLOGY

## 2024-07-12 PROCEDURE — 2720000010 HC SURG SUPPLY STERILE: Performed by: OBSTETRICS & GYNECOLOGY

## 2024-07-12 PROCEDURE — 7100000001 HC PACU RECOVERY - ADDTL 15 MIN: Performed by: OBSTETRICS & GYNECOLOGY

## 2024-07-12 PROCEDURE — 3700000001 HC ADD 15 MINUTES (ANESTHESIA): Performed by: OBSTETRICS & GYNECOLOGY

## 2024-07-12 PROCEDURE — 3600000019 HC SURGERY ROBOT ADDTL 15MIN: Performed by: OBSTETRICS & GYNECOLOGY

## 2024-07-12 PROCEDURE — S2900 ROBOTIC SURGICAL SYSTEM: HCPCS | Performed by: OBSTETRICS & GYNECOLOGY

## 2024-07-12 PROCEDURE — 6360000002 HC RX W HCPCS: Performed by: NURSE PRACTITIONER

## 2024-07-12 PROCEDURE — 81025 URINE PREGNANCY TEST: CPT

## 2024-07-12 PROCEDURE — 2580000003 HC RX 258: Performed by: ANESTHESIOLOGY

## 2024-07-12 PROCEDURE — 2580000003 HC RX 258: Performed by: OBSTETRICS & GYNECOLOGY

## 2024-07-12 PROCEDURE — 7100000010 HC PHASE II RECOVERY - FIRST 15 MIN: Performed by: OBSTETRICS & GYNECOLOGY

## 2024-07-12 PROCEDURE — 88307 TISSUE EXAM BY PATHOLOGIST: CPT

## 2024-07-12 PROCEDURE — 7100000011 HC PHASE II RECOVERY - ADDTL 15 MIN: Performed by: OBSTETRICS & GYNECOLOGY

## 2024-07-12 PROCEDURE — 3600000009 HC SURGERY ROBOT BASE: Performed by: OBSTETRICS & GYNECOLOGY

## 2024-07-12 PROCEDURE — 2709999900 HC NON-CHARGEABLE SUPPLY: Performed by: OBSTETRICS & GYNECOLOGY

## 2024-07-12 PROCEDURE — 7100000000 HC PACU RECOVERY - FIRST 15 MIN: Performed by: OBSTETRICS & GYNECOLOGY

## 2024-07-12 RX ORDER — ROCURONIUM BROMIDE 10 MG/ML
INJECTION, SOLUTION INTRAVENOUS PRN
Status: DISCONTINUED | OUTPATIENT
Start: 2024-07-12 | End: 2024-07-12 | Stop reason: SDUPTHER

## 2024-07-12 RX ORDER — MEPERIDINE HYDROCHLORIDE 25 MG/ML
12.5 INJECTION INTRAMUSCULAR; INTRAVENOUS; SUBCUTANEOUS EVERY 5 MIN PRN
Status: DISCONTINUED | OUTPATIENT
Start: 2024-07-12 | End: 2024-07-12 | Stop reason: HOSPADM

## 2024-07-12 RX ORDER — SODIUM CHLORIDE, SODIUM LACTATE, POTASSIUM CHLORIDE, CALCIUM CHLORIDE 600; 310; 30; 20 MG/100ML; MG/100ML; MG/100ML; MG/100ML
INJECTION, SOLUTION INTRAVENOUS CONTINUOUS PRN
Status: DISCONTINUED | OUTPATIENT
Start: 2024-07-12 | End: 2024-07-12 | Stop reason: SDUPTHER

## 2024-07-12 RX ORDER — BUPIVACAINE HYDROCHLORIDE 5 MG/ML
INJECTION, SOLUTION EPIDURAL; INTRACAUDAL PRN
Status: DISCONTINUED | OUTPATIENT
Start: 2024-07-12 | End: 2024-07-12 | Stop reason: HOSPADM

## 2024-07-12 RX ORDER — FENTANYL CITRATE 50 UG/ML
50 INJECTION, SOLUTION INTRAMUSCULAR; INTRAVENOUS EVERY 5 MIN PRN
Status: COMPLETED | OUTPATIENT
Start: 2024-07-12 | End: 2024-07-12

## 2024-07-12 RX ORDER — METRONIDAZOLE 500 MG/1
500 TABLET ORAL 2 TIMES DAILY
Qty: 14 TABLET | Refills: 0 | Status: SHIPPED | OUTPATIENT
Start: 2024-07-12 | End: 2024-07-19

## 2024-07-12 RX ORDER — KETOROLAC TROMETHAMINE 30 MG/ML
INJECTION, SOLUTION INTRAMUSCULAR; INTRAVENOUS PRN
Status: DISCONTINUED | OUTPATIENT
Start: 2024-07-12 | End: 2024-07-12 | Stop reason: SDUPTHER

## 2024-07-12 RX ORDER — DOCUSATE SODIUM 100 MG/1
100 CAPSULE, LIQUID FILLED ORAL 2 TIMES DAILY
Qty: 60 CAPSULE | Refills: 0 | Status: SHIPPED | OUTPATIENT
Start: 2024-07-12 | End: 2024-08-11

## 2024-07-12 RX ORDER — ONDANSETRON 2 MG/ML
4 INJECTION INTRAMUSCULAR; INTRAVENOUS ONCE
Status: DISCONTINUED | OUTPATIENT
Start: 2024-07-12 | End: 2024-07-12 | Stop reason: HOSPADM

## 2024-07-12 RX ORDER — SODIUM CHLORIDE 0.9 % (FLUSH) 0.9 %
5-40 SYRINGE (ML) INJECTION PRN
Status: DISCONTINUED | OUTPATIENT
Start: 2024-07-12 | End: 2024-07-12 | Stop reason: HOSPADM

## 2024-07-12 RX ORDER — KETAMINE HCL IN NACL, ISO-OSM 100MG/10ML
SYRINGE (ML) INJECTION PRN
Status: DISCONTINUED | OUTPATIENT
Start: 2024-07-12 | End: 2024-07-12 | Stop reason: SDUPTHER

## 2024-07-12 RX ORDER — FENTANYL CITRATE 50 UG/ML
100 INJECTION, SOLUTION INTRAMUSCULAR; INTRAVENOUS
Status: DISCONTINUED | OUTPATIENT
Start: 2024-07-12 | End: 2024-07-12 | Stop reason: HOSPADM

## 2024-07-12 RX ORDER — SODIUM CHLORIDE 9 MG/ML
INJECTION, SOLUTION INTRAVENOUS PRN
Status: DISCONTINUED | OUTPATIENT
Start: 2024-07-12 | End: 2024-07-12 | Stop reason: HOSPADM

## 2024-07-12 RX ORDER — OXYCODONE HYDROCHLORIDE AND ACETAMINOPHEN 5; 325 MG/1; MG/1
1 TABLET ORAL EVERY 6 HOURS PRN
Qty: 28 TABLET | Refills: 0 | Status: SHIPPED | OUTPATIENT
Start: 2024-07-12 | End: 2024-07-19

## 2024-07-12 RX ORDER — HYDROMORPHONE HYDROCHLORIDE 1 MG/ML
0.5 INJECTION, SOLUTION INTRAMUSCULAR; INTRAVENOUS; SUBCUTANEOUS EVERY 5 MIN PRN
Status: DISCONTINUED | OUTPATIENT
Start: 2024-07-12 | End: 2024-07-12 | Stop reason: HOSPADM

## 2024-07-12 RX ORDER — SODIUM CHLORIDE, SODIUM LACTATE, POTASSIUM CHLORIDE, CALCIUM CHLORIDE 600; 310; 30; 20 MG/100ML; MG/100ML; MG/100ML; MG/100ML
INJECTION, SOLUTION INTRAVENOUS CONTINUOUS
Status: DISCONTINUED | OUTPATIENT
Start: 2024-07-12 | End: 2024-07-12 | Stop reason: HOSPADM

## 2024-07-12 RX ORDER — SODIUM CHLORIDE 0.9 % (FLUSH) 0.9 %
5-40 SYRINGE (ML) INJECTION EVERY 12 HOURS SCHEDULED
Status: DISCONTINUED | OUTPATIENT
Start: 2024-07-12 | End: 2024-07-12 | Stop reason: HOSPADM

## 2024-07-12 RX ORDER — PROPOFOL 10 MG/ML
INJECTION, EMULSION INTRAVENOUS PRN
Status: DISCONTINUED | OUTPATIENT
Start: 2024-07-12 | End: 2024-07-12 | Stop reason: SDUPTHER

## 2024-07-12 RX ORDER — HYDRALAZINE HYDROCHLORIDE 20 MG/ML
10 INJECTION INTRAMUSCULAR; INTRAVENOUS
Status: DISCONTINUED | OUTPATIENT
Start: 2024-07-12 | End: 2024-07-12 | Stop reason: HOSPADM

## 2024-07-12 RX ORDER — PROCHLORPERAZINE EDISYLATE 5 MG/ML
5 INJECTION INTRAMUSCULAR; INTRAVENOUS
Status: DISCONTINUED | OUTPATIENT
Start: 2024-07-12 | End: 2024-07-12 | Stop reason: HOSPADM

## 2024-07-12 RX ORDER — LIDOCAINE HYDROCHLORIDE 20 MG/ML
INJECTION, SOLUTION EPIDURAL; INFILTRATION; INTRACAUDAL; PERINEURAL PRN
Status: DISCONTINUED | OUTPATIENT
Start: 2024-07-12 | End: 2024-07-12 | Stop reason: SDUPTHER

## 2024-07-12 RX ORDER — SODIUM CHLORIDE 9 MG/ML
INJECTION, SOLUTION INTRAVENOUS CONTINUOUS
Status: DISCONTINUED | OUTPATIENT
Start: 2024-07-12 | End: 2024-07-12 | Stop reason: HOSPADM

## 2024-07-12 RX ORDER — DIPHENHYDRAMINE HYDROCHLORIDE 50 MG/ML
12.5 INJECTION INTRAMUSCULAR; INTRAVENOUS
Status: DISCONTINUED | OUTPATIENT
Start: 2024-07-12 | End: 2024-07-12 | Stop reason: HOSPADM

## 2024-07-12 RX ORDER — ONDANSETRON 2 MG/ML
4 INJECTION INTRAMUSCULAR; INTRAVENOUS
Status: DISCONTINUED | OUTPATIENT
Start: 2024-07-12 | End: 2024-07-12 | Stop reason: HOSPADM

## 2024-07-12 RX ORDER — FLUTICASONE PROPIONATE 50 MCG
2 SPRAY, SUSPENSION (ML) NASAL AS NEEDED
Qty: 1 EACH | Refills: 0 | Status: SHIPPED | OUTPATIENT
Start: 2024-07-12

## 2024-07-12 RX ORDER — DEXAMETHASONE SODIUM PHOSPHATE 4 MG/ML
INJECTION, SOLUTION INTRA-ARTICULAR; INTRALESIONAL; INTRAMUSCULAR; INTRAVENOUS; SOFT TISSUE PRN
Status: DISCONTINUED | OUTPATIENT
Start: 2024-07-12 | End: 2024-07-12 | Stop reason: SDUPTHER

## 2024-07-12 RX ORDER — FENTANYL CITRATE 50 UG/ML
INJECTION, SOLUTION INTRAMUSCULAR; INTRAVENOUS PRN
Status: DISCONTINUED | OUTPATIENT
Start: 2024-07-12 | End: 2024-07-12 | Stop reason: SDUPTHER

## 2024-07-12 RX ORDER — MIDAZOLAM HYDROCHLORIDE 2 MG/2ML
2 INJECTION, SOLUTION INTRAMUSCULAR; INTRAVENOUS
Status: DISCONTINUED | OUTPATIENT
Start: 2024-07-12 | End: 2024-07-12 | Stop reason: HOSPADM

## 2024-07-12 RX ORDER — NALOXONE HYDROCHLORIDE 0.4 MG/ML
INJECTION, SOLUTION INTRAMUSCULAR; INTRAVENOUS; SUBCUTANEOUS PRN
Status: DISCONTINUED | OUTPATIENT
Start: 2024-07-12 | End: 2024-07-12 | Stop reason: HOSPADM

## 2024-07-12 RX ORDER — MIDAZOLAM HYDROCHLORIDE 1 MG/ML
INJECTION INTRAMUSCULAR; INTRAVENOUS PRN
Status: DISCONTINUED | OUTPATIENT
Start: 2024-07-12 | End: 2024-07-12 | Stop reason: SDUPTHER

## 2024-07-12 RX ORDER — ONDANSETRON 2 MG/ML
INJECTION INTRAMUSCULAR; INTRAVENOUS PRN
Status: DISCONTINUED | OUTPATIENT
Start: 2024-07-12 | End: 2024-07-12 | Stop reason: SDUPTHER

## 2024-07-12 RX ADMIN — ONDANSETRON 4 MG: 2 INJECTION INTRAMUSCULAR; INTRAVENOUS at 07:38

## 2024-07-12 RX ADMIN — ROCURONIUM BROMIDE 40 MG: 10 INJECTION, SOLUTION INTRAVENOUS at 07:38

## 2024-07-12 RX ADMIN — SUGAMMADEX 200 MG: 100 INJECTION, SOLUTION INTRAVENOUS at 09:14

## 2024-07-12 RX ADMIN — ONDANSETRON 4 MG: 2 INJECTION INTRAMUSCULAR; INTRAVENOUS at 09:11

## 2024-07-12 RX ADMIN — LIDOCAINE HYDROCHLORIDE 80 MG: 20 INJECTION, SOLUTION EPIDURAL; INFILTRATION; INTRACAUDAL; PERINEURAL at 07:33

## 2024-07-12 RX ADMIN — HYDROMORPHONE HYDROCHLORIDE 0.5 MG: 1 INJECTION, SOLUTION INTRAMUSCULAR; INTRAVENOUS; SUBCUTANEOUS at 09:17

## 2024-07-12 RX ADMIN — SODIUM CHLORIDE, POTASSIUM CHLORIDE, SODIUM LACTATE AND CALCIUM CHLORIDE: 600; 310; 30; 20 INJECTION, SOLUTION INTRAVENOUS at 06:36

## 2024-07-12 RX ADMIN — SODIUM CHLORIDE, POTASSIUM CHLORIDE, SODIUM LACTATE AND CALCIUM CHLORIDE: 600; 310; 30; 20 INJECTION, SOLUTION INTRAVENOUS at 07:28

## 2024-07-12 RX ADMIN — FENTANYL CITRATE 50 MCG: 0.05 INJECTION, SOLUTION INTRAMUSCULAR; INTRAVENOUS at 10:30

## 2024-07-12 RX ADMIN — PROPOFOL 200 MG: 10 INJECTION, EMULSION INTRAVENOUS at 07:33

## 2024-07-12 RX ADMIN — FENTANYL CITRATE 100 MCG: 50 INJECTION, SOLUTION INTRAMUSCULAR; INTRAVENOUS at 07:33

## 2024-07-12 RX ADMIN — FENTANYL CITRATE 25 MCG: 0.05 INJECTION, SOLUTION INTRAMUSCULAR; INTRAVENOUS at 10:40

## 2024-07-12 RX ADMIN — FLUORESCEIN 300 MG: 500 INJECTION INTRAVENOUS at 08:43

## 2024-07-12 RX ADMIN — ROCURONIUM BROMIDE 20 MG: 10 INJECTION, SOLUTION INTRAVENOUS at 08:06

## 2024-07-12 RX ADMIN — SODIUM CHLORIDE, POTASSIUM CHLORIDE, SODIUM LACTATE AND CALCIUM CHLORIDE: 600; 310; 30; 20 INJECTION, SOLUTION INTRAVENOUS at 09:14

## 2024-07-12 RX ADMIN — Medication 30 MG: at 07:58

## 2024-07-12 RX ADMIN — KETOROLAC TROMETHAMINE 30 MG: 30 INJECTION, SOLUTION INTRAMUSCULAR at 09:10

## 2024-07-12 RX ADMIN — MIDAZOLAM HYDROCHLORIDE 3 MG: 1 INJECTION, SOLUTION INTRAMUSCULAR; INTRAVENOUS at 07:26

## 2024-07-12 RX ADMIN — DEXAMETHASONE SODIUM PHOSPHATE 4 MG: 4 INJECTION INTRA-ARTICULAR; INTRALESIONAL; INTRAMUSCULAR; INTRAVENOUS; SOFT TISSUE at 07:38

## 2024-07-12 RX ADMIN — WATER 2000 MG: 1 INJECTION INTRAMUSCULAR; INTRAVENOUS; SUBCUTANEOUS at 07:38

## 2024-07-12 RX ADMIN — Medication 20 MG: at 08:06

## 2024-07-12 RX ADMIN — MIDAZOLAM HYDROCHLORIDE 2 MG: 1 INJECTION, SOLUTION INTRAMUSCULAR; INTRAVENOUS at 07:28

## 2024-07-12 RX ADMIN — HYDROMORPHONE HYDROCHLORIDE 0.5 MG: 1 INJECTION, SOLUTION INTRAMUSCULAR; INTRAVENOUS; SUBCUTANEOUS at 09:23

## 2024-07-12 RX ADMIN — ROCURONIUM BROMIDE 10 MG: 10 INJECTION, SOLUTION INTRAVENOUS at 07:33

## 2024-07-12 RX ADMIN — FENTANYL CITRATE 25 MCG: 0.05 INJECTION, SOLUTION INTRAMUSCULAR; INTRAVENOUS at 11:00

## 2024-07-12 ASSESSMENT — ENCOUNTER SYMPTOMS
CHEST TIGHTNESS: 0
ABDOMINAL DISTENTION: 0
NAUSEA: 0
VOMITING: 0
WHEEZING: 0
SHORTNESS OF BREATH: 0

## 2024-07-12 ASSESSMENT — PAIN DESCRIPTION - ORIENTATION
ORIENTATION: INNER

## 2024-07-12 ASSESSMENT — PAIN DESCRIPTION - LOCATION
LOCATION: ABDOMEN

## 2024-07-12 ASSESSMENT — PAIN SCALES - GENERAL
PAINLEVEL_OUTOF10: 6
PAINLEVEL_OUTOF10: 5
PAINLEVEL_OUTOF10: 0
PAINLEVEL_OUTOF10: 5
PAINLEVEL_OUTOF10: 3

## 2024-07-12 ASSESSMENT — PAIN DESCRIPTION - DESCRIPTORS
DESCRIPTORS: CRAMPING

## 2024-07-12 ASSESSMENT — PAIN - FUNCTIONAL ASSESSMENT: PAIN_FUNCTIONAL_ASSESSMENT: 0-10

## 2024-07-12 NOTE — DISCHARGE INSTRUCTIONS
______________________________________________________________________    Anesthesia Discharge Instructions    After general anesthesia or intervenous sedation, for 24 hours or while taking prescription Narcotics:  Limit your activities  Do not drive or operate hazardous machinery  If you have not urinated within 8 hours after discharge, please contact your surgeon on call.  Do not make important personal or business decisions  Do not drink alcoholic beverages    Report the following to your surgeon:  Excessive pain, swelling, redness or odor of or around the surgical area  Temperature over 100.5 degrees  Nausea and vomiting lasting longer than 4 hours or if unable to take medication  Any signs of decreased circulation or nerve impairment to extremity:  Change in color, persistent numbness, tingling, coldness or increased pain.  Any questions    You took Toradol at 9:10 am , no Ibuprofen products before 3:10 pm today .

## 2024-07-12 NOTE — BRIEF OP NOTE
Brief Op Note    Date of Surgery: 07/12/24     Pre Operative Diagnosis/Indication: Complex hyperplasia, uterine fibroid    Post Operative Diagnosis: Pre-Op Diagnosis Codes:     * Complex endometrial hyperplasia [N85.01]  and uterine fibroid    Surgeon: Dr. Berumen    Assistant: Dr Krishna and SA assigned to room. Dr Krishna was scrubbed and present for entire procedure. She was requested due to patient's previous abdominoplasty making entry into the abdomen difficult.     Procedure Performed: Davinci assisted TLH, resection of bilateral distal fallopian tubes and cystoscopy.     Anesthesia: GETA    EBL: 20 cc    QBL: pending cc    IVF: 1000 cc    UOP: 200 cc    Findings: Opening abdominal pressure was 7 mmHg. Insufflated to 15 mmHg. Survey of the pelvis showed bilateral ureters, normal appearing left ovary, disrupted left fallopian tube with just fimbriated end noted, enlarged uterus with a 4 cm posterior pedunculated fibroid, disrupted right fallopian tube with fimbriated end noted on the normal appearing ovary. After administration of fluorecin, cystoscopy was done by Dr Krishna. No bladder defects noted. Jets of urine noted from each ureteral orifice. Frozen specimen showed proliferative endometrium. No hyperplasia noted.     Specimen: Uterus, cervix and distal portions of the fallopian tubes    Implant: none    Complications: none apparent    Condition Following Surgery: Stable to RR    Chino Berumen MD    Dictation Number: [id number]

## 2024-07-12 NOTE — ANESTHESIA POSTPROCEDURE EVALUATION
Department of Anesthesiology  Postprocedure Note    Patient: Earnestine Tavares  MRN: [id number]  YOB: 1980  Date of evaluation: 7/12/2024    Procedure Summary       Date: 07/12/24 Room / Location: [facility] MAIN OR [address] [place] [address] MAIN OR    Anesthesia Start: 0728 Anesthesia Stop: 0941    Procedure: ROBOTIC ASSISTED TOTAL LAPAROSCOPIC HYSTERECTOMY, BILATERAL SALPINGECTOMY, CYSTOSCOPY (Uterus) Diagnosis:       Complex endometrial hyperplasia      (Complex endometrial hyperplasia [N85.01])    Providers: Chino Berumen MD Responsible Provider: Phani Esteves DO    Anesthesia Type: General ASA Status: 2            Anesthesia Type: General    Sobia Phase I: Sobia Score: 8    Sobia Phase II:      Anesthesia Post Evaluation    Patient location during evaluation: PACU  Patient participation: complete - patient participated  Level of consciousness: awake  Pain score: 0  Airway patency: patent  Nausea & Vomiting: no nausea  Cardiovascular status: hemodynamically stable  Respiratory status: acceptable  Hydration status: euvolemic  Comments: Seen, no complaints   Pain management: adequate    No notable events documented.

## 2024-07-12 NOTE — OP NOTE
[address]  [address]                            OPERATIVE REPORT      PATIENT NAME: KAEL FISHER               : 1980  MED REC NO: [id number]                       ROOM: OR  ACCOUNT NO: [id number]                       ADMIT DATE: 2024  PROVIDER: Chino Berumen MD    DATE OF SERVICE:  2024    PREOPERATIVE DIAGNOSES:       1. Complex hyperplasia of the endometrium.     2. Uterine fibroid.    POSTOPERATIVE DIAGNOSES:       1. Complex hyperplasia of the endometrium.     2. Uterine fibroid.    PROCEDURES PERFORMED:  Da Miya assisted total laparoscopic hysterectomy, resection of bilateral distal fallopian tubes, and cystoscopy.    SURGEON:  Chino Berumen MD    ASSISTANT:  Dr. Krishna and the SA assigned to the room.    Dr. Krishna was scrubbed and present for the entire procedure.  She was requested due to the patient's previous abdominoplasty making entry into the abdomen difficult.    ANESTHESIA:  General anesthesia via endotracheal tube.    ESTIMATED BLOOD LOSS:  20 mL.  QBL is pending.    SPECIMENS REMOVED:  Uterus, cervix, and distal portions of the fallopian tubes.    INTRAOPERATIVE FINDINGS:  Opening abdominal pressure was 7 mmHg.  Abdomen was insufflated 15 mmHg with carbon dioxide.  Survey of the pelvis showed bilateral ureters coursing deep along the pelvic brim away from surgical field.  Normal-appearing left ovary.  Disrupted left fallopian tube with just the fimbriated end noted.  Enlarged uterus with a 4 cm posterior pedunculated fibroid.  Disrupted right fallopian tube with fimbriated end noted adjacent to the normal-appearing right ovary.  After administration of fluorescein, cystoscopy was performed by Dr. Krishna.  No bladder defects noted.  Jets of urine were noted from each ureteral orifice.  Frozen specimen showed proliferative endometrium.  No residual hyperplasia noted.     COMPLICATIONS:  None

## 2024-07-12 NOTE — PERIOP NOTE
[employer] on sterile filed used PRN by Md during case    REF: [id number]  LOT: [id number]  SN: [id number]  EXP: 3-

## 2024-07-12 NOTE — ANESTHESIA PRE PROCEDURE
Department of Anesthesiology  Preprocedure Note       Name:  Earnestine Tavares   Age:  43 y.o.  :  1980                                          MRN:  [id number]         Date:  2024      Surgeon: Surgeon(s):  Chino Berumen MD Sharp, Stacey L, MD    Procedure: Procedure(s):  ROBOTIC ASSISTED TOTAL LAPAROSCOPIC HYSTERECTOMY, BILATERAL SALPINGECTOMY, CYSTOSCOPY    Medications prior to admission:   Prior to Admission medications    Medication Sig Start Date End Date Taking? Authorizing Provider   vitamin C (ASCORBIC ACID) 500 MG tablet Take 1 tablet by mouth daily   Yes ProviderTerri MD   vitamin E 400 UNIT capsule Take 1 capsule by mouth daily   Yes Provider, MD Terri   Multiple Vitamin (MULTIVITAMIN ADULT PO) Take by mouth daily   Yes ProviderTerri MD   amLODIPine (NORVASC) 10 MG tablet Take 1 tablet by mouth daily  Patient taking differently: Take 1 tablet by mouth every morning 24   Fortino Gurrola DO   fluticasone (FLONASE) 50 MCG/ACT nasal spray 2 sprays by Nasal route daily  Patient taking differently: 2 sprays by Nasal route as needed 24   Fortino Gurrola DO   hydroCHLOROthiazide (HYDRODIURIL) 25 MG tablet Take 1 tablet by mouth daily  Patient taking differently: Take 1 tablet by mouth every morning 24   Fortino Gurrola DO       Current medications:    Current Facility-Administered Medications   Medication Dose Route Frequency Provider Last Rate Last Admin   • ceFAZolin (ANCEF) 2,000 mg in sterile water 20 mL IV syringe  2,000 mg IntraVENous Q8H Chino Berumen MD       • fentaNYL (SUBLIMAZE) injection 100 mcg  100 mcg IntraVENous Once PRN Phani Esteves DO       • ondansetron (ZOFRAN) injection 4 mg  4 mg IntraVENous Once Phani Esteves DO       • 0.9 % sodium chloride infusion   IntraVENous Continuous Phani Esteves DO       • lactated ringers IV soln infusion   IntraVENous Continuous Phani Esteves  mL/hr at 24 0636 New Bag

## 2024-07-12 NOTE — DISCHARGE SUMMARY
Discharge Summary    Date: 7/12/2024  Patient Name: Earnestine Tavares    YOB: 1980     Age: 43 y.o.    Admit Date: 7/12/2024  Discharge Date: 7/12/2024  Discharge Condition: Stable    Admission Diagnosis  Complex endometrial hyperplasia [N85.01]      Discharge Diagnosis  Principal Problem:    Complex endometrial hyperplasia  Active Problems:    Endometrial hyperplasia  Resolved Problems:    * No resolved hospital problems. *      Hospital Stay  Narrative of Hospital Course:  Pt admitted for surgery. Underwent Davinci total laparoscopic hysterectomy with bilateral distal salpingectomy and cystoscopy. Routine post op course and discharged home.     Consultants:  None    Surgeries/procedures Performed:      Treatments:    Surgery        Discharge Plan/Disposition:  Home    Hospital/Incidental Findings Requiring Follow Up:    Patient Instructions:    Diet:    Activity:No Driving for 2 Weeks, No Lifting, Driving or Strenuous Excercise and No Sex for  For number of days (if applicable): 6      Other Instructions:    Provider Follow-Up:   No follow-ups on file.     Significant Diagnostic Studies:    Recent Labs:  Admission on 07/12/2024  Preg Test, Ur                                 Date: 07/12/2024  Value: Negative    Ref range: NEG                Status: Final  ------------    Radiology last 7 days:  No results found.     [unfilled]    Discharge Medications    Current Discharge Medication List        Current Discharge Medication List        Current Discharge Medication List    CONTINUE these medications which have NOT CHANGED    vitamin C (ASCORBIC ACID) 500 MG tablet  Take 1 tablet by mouth daily    vitamin E 400 UNIT capsule  Take 1 capsule by mouth daily    Multiple Vitamin (MULTIVITAMIN ADULT PO)  Take by mouth daily    amLODIPine (NORVASC) 10 MG tablet  Take 1 tablet by mouth daily  Qty: 90 tablet Refills: 1  Associated Diagnoses:Essential hypertension    fluticasone (FLONASE) 50 MCG/ACT nasal spray  2

## 2024-07-12 NOTE — H&P
Surgery H+P      Chief Complaint: Surgery    HPI:     Pt presents to [facility] today for Davinci Total laparoscopic hysterotomy with bilateral salpingectomy and Cystoscopy to address complex hyperplasia noted in the pathology from her last surgery. Currently the patient is without complaints. Treatment options have been discussed previously and questions answered.       Past Medical History:    Past Medical History:   Diagnosis Date    Gall stone 2019    GERD (gastroesophageal reflux disease)     Heavy menstrual bleeding 2024    Helicobacter pylori ab+ 05/21/2018    Hypertension     Migraine without status migrainosus, not intractable, unspecified migraine type 02/23/2022    Obesity     Uterine fibroid      Past Surgical History:   Procedure Laterality Date    ABDOMINOPLASTY      BREAST ENHANCEMENT SURGERY Bilateral 2021    silicone, IMPLANTS    CARPAL TUNNEL RELEASE      CHOLECYSTECTOMY  2020    COLONOSCOPY      DILATION AND CURETTAGE OF UTERUS N/A 05/24/2024    HYSTEROSCOPY DILATATION AND CURETTAGE, polypectomy WITH novasure ENDOMETRIAL ABLATION performed by Chino Berumen MD at [facility] MAIN OR    ENDOMETRIAL ABLATION  05/24/2024    ENDOSCOPY, COLON, DIAGNOSTIC      HYSTEROSCOPY W/ POLYPECTOMY  05/24/2024    with novasure endometrial ablation    IR CHOLECYSTOSTOMY PERCUTANEOUS COMPLETE      LIPOSUCTION      back, arms    TUBAL LIGATION  2000    TUBAL LIGATION  1999    US ASP BREAST CYST LEFT Left 04/11/2022    US BREAST CYST ASPIRATION LEFT 4/11/2022 [facility] RAD MAMMO       Current Facility-Administered Medications   Medication Dose Route Frequency Provider Last Rate Last Admin    ceFAZolin (ANCEF) 2,000 mg in sterile water 20 mL IV syringe  2,000 mg IntraVENous Q8H Chino Berumen MD        fentaNYL (SUBLIMAZE) injection 100 mcg  100 mcg IntraVENous Once PRN Phani Esteves R, DO        ondansetron (ZOFRAN) injection 4 mg  4 mg IntraVENous Once Phani Esteves, DO        0.9 % sodium chloride infusion

## 2024-07-26 ENCOUNTER — TELEPHONE (OUTPATIENT)
Age: 44
End: 2024-07-26

## 2024-07-26 NOTE — TELEPHONE ENCOUNTER
Patient called asking for Dr. Berumen to give her a call if possible to answer some questions for her. She had to cancel her post op appointment this morning, but patient will call back to reschedule for some time this upcoming week.     Please advise.     7/26/24 943am. Called patient back per her request. Pt could not make appt today due to transportation. States she is doing well. Pathology reviewed (benign). Questions answered. Pt will call to schedule a 4 week post op visit.     Chino Berumen MD

## 2024-08-09 ENCOUNTER — OFFICE VISIT (OUTPATIENT)
Age: 44
End: 2024-08-09

## 2024-08-09 VITALS
WEIGHT: 248 LBS | BODY MASS INDEX: 34.72 KG/M2 | DIASTOLIC BLOOD PRESSURE: 74 MMHG | SYSTOLIC BLOOD PRESSURE: 118 MMHG | HEIGHT: 71 IN

## 2024-08-09 DIAGNOSIS — Z09 POSTOP CHECK: Primary | ICD-10-CM

## 2024-08-09 PROBLEM — D25.2 INTRAMURAL, SUBMUCOUS, AND SUBSEROUS LEIOMYOMA OF UTERUS: Status: RESOLVED | Noted: 2023-07-13 | Resolved: 2024-08-09

## 2024-08-09 PROBLEM — D25.0 INTRAMURAL, SUBMUCOUS, AND SUBSEROUS LEIOMYOMA OF UTERUS: Status: RESOLVED | Noted: 2023-07-13 | Resolved: 2024-08-09

## 2024-08-09 PROBLEM — N85.00 ENDOMETRIAL HYPERPLASIA: Status: RESOLVED | Noted: 2024-07-12 | Resolved: 2024-08-09

## 2024-08-09 PROBLEM — D25.1 INTRAMURAL, SUBMUCOUS, AND SUBSEROUS LEIOMYOMA OF UTERUS: Status: RESOLVED | Noted: 2023-07-13 | Resolved: 2024-08-09

## 2024-08-09 PROBLEM — N85.01 COMPLEX ENDOMETRIAL HYPERPLASIA: Status: RESOLVED | Noted: 2024-06-18 | Resolved: 2024-08-09

## 2024-08-09 PROCEDURE — 99024 POSTOP FOLLOW-UP VISIT: CPT | Performed by: OBSTETRICS & GYNECOLOGY

## 2024-08-09 RX ORDER — METRONIDAZOLE 500 MG/1
500 TABLET ORAL 2 TIMES DAILY
Qty: 14 TABLET | Refills: 0 | Status: SHIPPED | OUTPATIENT
Start: 2024-08-09 | End: 2024-08-16

## 2024-08-09 ASSESSMENT — PATIENT HEALTH QUESTIONNAIRE - PHQ9
SUM OF ALL RESPONSES TO PHQ QUESTIONS 1-9: 2
1. LITTLE INTEREST OR PLEASURE IN DOING THINGS: NOT AT ALL
SUM OF ALL RESPONSES TO PHQ QUESTIONS 1-9: 2
SUM OF ALL RESPONSES TO PHQ QUESTIONS 1-9: 2
2. FEELING DOWN, DEPRESSED OR HOPELESS: MORE THAN HALF THE DAYS
SUM OF ALL RESPONSES TO PHQ QUESTIONS 1-9: 2
SUM OF ALL RESPONSES TO PHQ9 QUESTIONS 1 & 2: 2

## 2024-08-09 NOTE — PROGRESS NOTES
Post Op Visit    Chief Complaint:   Chief Complaint   Patient presents with    Follow-up     Post op        HPI:     Earnestine Tavares is a 43 y.o.  female who presents for her post operative visit after having Davinci Total laparoscopic hysterotomy with bilateral salpingectomy and Cystoscopy done on 24 with Dr. Krishna. Patient's pathology was reviewed and was benign.  Patient is having post operative problems. Pt has noticed some spotting since surgery.      Past Medical History:    Past Medical History:   Diagnosis Date    Gall stone     GERD (gastroesophageal reflux disease)     Heavy menstrual bleeding     Helicobacter pylori ab+ 2018    Hypertension     Migraine without status migrainosus, not intractable, unspecified migraine type 2022    Obesity     Uterine fibroid         Past Surgical History:   Procedure Laterality Date    ABDOMINOPLASTY      BREAST ENHANCEMENT SURGERY Bilateral     silicone, IMPLANTS    CARPAL TUNNEL RELEASE      CHOLECYSTECTOMY      COLONOSCOPY      DILATION AND CURETTAGE OF UTERUS N/A 2024    HYSTEROSCOPY DILATATION AND CURETTAGE, polypectomy WITH novasure ENDOMETRIAL ABLATION performed by Chino Berumen MD at [facility] MAIN OR    ENDOMETRIAL ABLATION  2024    ENDOSCOPY, COLON, DIAGNOSTIC      HYSTERECTOMY (CERVIX STATUS UNKNOWN) N/A 2024    ROBOTIC ASSISTED TOTAL LAPAROSCOPIC HYSTERECTOMY, BILATERAL SALPINGECTOMY, CYSTOSCOPY performed by Chino Berumen MD at [facility] MAIN OR    HYSTEROSCOPY W/ POLYPECTOMY  2024    with novasure endometrial ablation    IR CHOLECYSTOSTOMY PERCUTANEOUS COMPLETE      LIPOSUCTION      back, arms    TUBAL LIGATION      TUBAL LIGATION      US ASP BREAST CYST LEFT Left 2022    US BREAST CYST ASPIRATION LEFT 2022 [facility] RAD MAMMO     Social History     Occupational History    Not on file   Tobacco Use    Smoking status: Former     Current packs/day: 0.00     Types: Cigarettes     Quit

## 2024-08-09 NOTE — PROGRESS NOTES
Earnestine Tavares is a 43 y.o. female presents for a follow up visit.    Chief Complaint   Patient presents with    Follow-up     Post op     No LMP recorded. Patient has had an ablation.  Birth Control: none    The patient is reporting having: bleeding that started today        1. Have you been to the ER, urgent care clinic, or hospitalized since your last visit? No    2. Have you seen or consulted any other health care providers outside of the [facility] since your last visit? No    Examination chaperoned by ALEENA TAMEZ LPN.

## 2024-08-10 DIAGNOSIS — I10 ESSENTIAL HYPERTENSION: ICD-10-CM

## 2024-08-13 RX ORDER — HYDROCHLOROTHIAZIDE 25 MG/1
25 TABLET ORAL DAILY
Qty: 30 TABLET | Refills: 0 | Status: SHIPPED | OUTPATIENT
Start: 2024-08-13

## 2024-08-29 ENCOUNTER — PATIENT MESSAGE (OUTPATIENT)
Age: 44
End: 2024-08-29

## 2024-08-29 RX ORDER — FLUCONAZOLE 150 MG/1
150 TABLET ORAL ONCE
Qty: 1 TABLET | Refills: 0 | Status: SHIPPED | OUTPATIENT
Start: 2024-08-29 | End: 2024-08-29

## 2024-08-29 NOTE — TELEPHONE ENCOUNTER
Patient requesting Diflucan prescription - states she usually takes this after being treated by antibiotics. Patient previously treated with Flagyl 08/09/2024. Patient states she currently has white discharge.  Diflucan prescription pended for review, diagnosis association, and signing.

## 2024-10-15 ENCOUNTER — OFFICE VISIT (OUTPATIENT)
Age: 44
End: 2024-10-15
Payer: MEDICAID

## 2024-10-15 VITALS
DIASTOLIC BLOOD PRESSURE: 85 MMHG | TEMPERATURE: 97.9 F | WEIGHT: 259 LBS | SYSTOLIC BLOOD PRESSURE: 139 MMHG | HEIGHT: 71 IN | BODY MASS INDEX: 36.26 KG/M2 | OXYGEN SATURATION: 97 % | RESPIRATION RATE: 19 BRPM | HEART RATE: 69 BPM

## 2024-10-15 DIAGNOSIS — N76.0 ACUTE VAGINITIS: Primary | ICD-10-CM

## 2024-10-15 PROCEDURE — 99212 OFFICE O/P EST SF 10 MIN: CPT | Performed by: OBSTETRICS & GYNECOLOGY

## 2024-10-15 PROCEDURE — 3075F SYST BP GE 130 - 139MM HG: CPT | Performed by: OBSTETRICS & GYNECOLOGY

## 2024-10-15 PROCEDURE — 3079F DIAST BP 80-89 MM HG: CPT | Performed by: OBSTETRICS & GYNECOLOGY

## 2024-10-15 NOTE — PROGRESS NOTES
Earnestine Tavares is a 43 y.o. female presents for a problem visit.    Chief Complaint   Patient presents with    Other     Vaginal discharge.      No LMP recorded. Patient has had an ablation.  Birth Control: s/p ablation  Last Pap: 03/27/2023    The patient is reporting having: vaginal discharge.  No itching. No odor            1. Have you been to the ER, urgent care clinic, or hospitalized since your last visit? No    2. Have you seen or consulted any other health care providers outside of the [facility] since your last visit? No    Examination chaperoned by Silvia Newton LPN.

## 2024-10-15 NOTE — PROGRESS NOTES
Vaginitis/Vulvitis Problem Visit    Chief Complaint:   Chief Complaint   Patient presents with    Other     Vaginal discharge.         HPI:    Earnestine Tavares is a 43 y.o.  female who presents for the evaluation of vaginal discharge. Pt reports the problem started  week(s) ago and describes it as thin and white.  She had the following risk factors: none.  STD screening offered and patient wants no STD testing today. Pt notes since her hysterectomy that she's had intermittent white discharge.      LMP: No LMP recorded. Patient has had an ablation.    Past Medical History:     Past Medical History:   Diagnosis Date    Gall stone     GERD (gastroesophageal reflux disease)     Heavy menstrual bleeding     Helicobacter pylori ab+ 2018    Hypertension     Migraine without status migrainosus, not intractable, unspecified migraine type 2022    Obesity     Uterine fibroid         Past Surgical History:   Procedure Laterality Date    ABDOMINOPLASTY      BREAST ENHANCEMENT SURGERY Bilateral     silicone, IMPLANTS    CARPAL TUNNEL RELEASE      CHOLECYSTECTOMY      COLONOSCOPY      DILATION AND CURETTAGE OF UTERUS N/A 2024    HYSTEROSCOPY DILATATION AND CURETTAGE, polypectomy WITH novasure ENDOMETRIAL ABLATION performed by Chino Berumen MD at [facility] MAIN OR    ENDOMETRIAL ABLATION  2024    ENDOSCOPY, COLON, DIAGNOSTIC      HYSTERECTOMY (CERVIX STATUS UNKNOWN) N/A 2024    ROBOTIC ASSISTED TOTAL LAPAROSCOPIC HYSTERECTOMY, BILATERAL SALPINGECTOMY, CYSTOSCOPY performed by Chino Berumen MD at [facility] MAIN OR    HYSTEROSCOPY W/ POLYPECTOMY  2024    with novasure endometrial ablation    IR CHOLECYSTOSTOMY PERCUTANEOUS COMPLETE      LIPOSUCTION      back, arms    ROBOTIC ASSISTED HYSTERECTOMY  2024    With bilateral salpingectomy, cystoscopy. Done for hyperplasia noted at endometrial ablation    TUBAL LIGATION      US ASP BREAST CYST LEFT Left 2022    US

## 2024-10-18 ENCOUNTER — PATIENT MESSAGE (OUTPATIENT)
Age: 44
End: 2024-10-18

## 2024-10-18 LAB
A VAGINAE DNA VAG QL NAA+PROBE: ABNORMAL SCORE
BVAB2 DNA VAG QL NAA+PROBE: ABNORMAL SCORE
C ALBICANS DNA VAG QL NAA+PROBE: NEGATIVE
C GLABRATA DNA VAG QL NAA+PROBE: NEGATIVE
C TRACH DNA SPEC QL NAA+PROBE: NEGATIVE
M GENITALIUM DNA SPEC QL NAA+PROBE: NEGATIVE
M HOMINIS DNA SPEC QL NAA+PROBE: NEGATIVE
MEGA1 DNA VAG QL NAA+PROBE: ABNORMAL SCORE
N GONORRHOEA DNA VAG QL NAA+PROBE: NEGATIVE
T VAGINALIS DNA VAG QL NAA+PROBE: NEGATIVE
UREAPLASMA DNA SPEC QL NAA+PROBE: NEGATIVE

## 2024-10-18 RX ORDER — METRONIDAZOLE 500 MG/1
500 TABLET ORAL 2 TIMES DAILY
Qty: 14 TABLET | Refills: 0 | Status: SHIPPED | OUTPATIENT
Start: 2024-10-18 | End: 2024-10-25

## 2024-10-21 RX ORDER — FLUCONAZOLE 150 MG/1
150 TABLET ORAL ONCE
Qty: 1 TABLET | Refills: 0 | Status: SHIPPED | OUTPATIENT
Start: 2024-10-21 | End: 2024-10-21

## 2024-11-06 ENCOUNTER — TELEPHONE (OUTPATIENT)
Age: 44
End: 2024-11-06

## 2024-11-06 NOTE — TELEPHONE ENCOUNTER
----- Message from [employer] sent at 11/5/2024  3:38 PM EST -----  Regarding: [facility] Appointment Request  [facility] Appointment Request    Patient needs appointment for [facility] Appointment Type: Annual Visit.    Patient Requested Dates(s): 11/11/24  Patient Requested Time: anytime  Provider Name: Fortino GurrolaDO    Reason for Appointment Request: Established Patient - No appointments available during search      Pt want to sched an AWV.  --------------------------------------------------------------------------------------------------------------------------    Relationship to Patient: Self     Call Back Information: OK to leave message on voicemail  Preferred Call Back Number: Phone [phone]

## 2024-11-18 ENCOUNTER — TELEPHONE (OUTPATIENT)
Age: 44
End: 2024-11-18

## 2024-11-18 NOTE — TELEPHONE ENCOUNTER
----- Message from Sylvie COBIAN sent at 11/18/2024  9:35 AM EST -----  Regarding: [facility] Appointment Request  [facility] Appointment Request    Patient needs appointment for [facility] Appointment Type: Annual Visit./ Annual wellness visit, last visit over a year      Patient Requested Dates(s):This week, Tuesday or Wednesday   Patient Requested Time:Morning   Provider Name:Fortino GurrolaDO    Reason for Appointment Request: Established Patient - No appointments available during search  --------------------------------------------------------------------------------------------------------------------------    Relationship to Patient: Self     Call Back Information: OK to leave message on voicemail  Preferred Call Back Number: Phone [phone]

## 2024-11-18 NOTE — TELEPHONE ENCOUNTER
----- Message from Sylvie COBIAN sent at 11/18/2024  9:37 AM EST -----  Regarding: [facility] Referral Request  [facility] Referral Request    Reason for referral request: Lab/Test Order    Specialist/Lab/Test patient is requesting (if known):Bloodwork    Specialist Phone Number (if applicable):    Additional Information Patient needs an order.  --------------------------------------------------------------------------------------------------------------------------    Relationship to Patient: Self     Call Back Information: OK to leave message on voicemail  Preferred Call Back Number: Phone  [phone]

## 2024-11-19 ENCOUNTER — HOSPITAL ENCOUNTER (OUTPATIENT)
Facility: HOSPITAL | Age: 44
Discharge: HOME | End: 2024-11-22
Payer: MEDICAID

## 2024-11-19 ENCOUNTER — OFFICE VISIT (OUTPATIENT)
Age: 44
End: 2024-11-19
Payer: MEDICAID

## 2024-11-19 VITALS
OXYGEN SATURATION: 100 % | SYSTOLIC BLOOD PRESSURE: 132 MMHG | DIASTOLIC BLOOD PRESSURE: 80 MMHG | BODY MASS INDEX: 35.43 KG/M2 | WEIGHT: 254 LBS

## 2024-11-19 DIAGNOSIS — Z11.3 SCREENING EXAMINATION FOR STI: ICD-10-CM

## 2024-11-19 DIAGNOSIS — Z01.818 PRE-OP EXAM: Primary | ICD-10-CM

## 2024-11-19 DIAGNOSIS — Z01.818 PRE-OP EXAM: ICD-10-CM

## 2024-11-19 DIAGNOSIS — Z11.59 SCREENING FOR VIRAL DISEASE: ICD-10-CM

## 2024-11-19 DIAGNOSIS — Z13.1 DIABETES MELLITUS SCREENING: ICD-10-CM

## 2024-11-19 PROCEDURE — 3075F SYST BP GE 130 - 139MM HG: CPT | Performed by: INTERNAL MEDICINE

## 2024-11-19 PROCEDURE — 71046 X-RAY EXAM CHEST 2 VIEWS: CPT

## 2024-11-19 PROCEDURE — 99214 OFFICE O/P EST MOD 30 MIN: CPT | Performed by: INTERNAL MEDICINE

## 2024-11-19 PROCEDURE — 3079F DIAST BP 80-89 MM HG: CPT | Performed by: INTERNAL MEDICINE

## 2024-11-19 ASSESSMENT — ENCOUNTER SYMPTOMS
RESPIRATORY NEGATIVE: 1
BACK PAIN: 1
GASTROINTESTINAL NEGATIVE: 1

## 2024-11-19 NOTE — PROGRESS NOTES
\"Have you been to the ER, urgent care clinic since your last visit?  Hospitalized since your last visit?\"    NO    “Have you seen or consulted any other health care providers outside our system since your last visit?”    NO    Have you had a mammogram?”   NO    Date of last Mammogram: 4/5/2022       “Have you had a colorectal cancer screening such as a colonoscopy/FIT/Cologuard?    NO    No colonoscopy on file  No cologuard on file  No FIT/FOBT on file   No flexible sigmoidoscopy on file            
are clear to auscultation. No wheezing.  Heart sounds S1 and S2 heard with regular rate and rhythm.  Abdomen is soft and nondistended.  No spinal tenderness upon palpation. No CVA tenderness.  are equal. Radial pulses are +2. Upper body strength is +5. Lower body strength is +5. Posterior tibial pulses are present. No ankle swelling.    Vital Signs  Blood pressure is 132/80.      Assessment & Plan    Assessment & Plan  1. Preoperative evaluation.  Blood work will be ordered, including a viral panel to screen for hepatitis B and C, and a pregnancy test. A chest x-ray and EKG will also be ordered. She is advised to maintain adequate hydration.    2. Hypertension.  Blood pressure today is 132/80, which is within a good range. She is currently on amlodipine 10 mg and hydrochlorothiazide (HCTZ) 25 mg. She is advised to continue her current medication regimen.    3. Seasonal rhinitis.  She is currently using Flonase as needed. She is advised to continue using it as required.    4. Vitamin D deficiency.  She reports needing more vitamin C. She is advised to continue taking over-the-counter vitamin C gummies.    5. Obesity.  She reports difficulty in losing weight despite being active and changing her diet. She is advised to maintain her current diet and exercise regimen.    6. Migraines.  She reports experiencing migraines and has been using Topamax, which she stopped a few days ago due to side effects like dry mouth. She is advised to ensure adequate fluid intake when using Topamax to mitigate dryness.          Earnestine was seen today for pre-op exam.    Diagnoses and all orders for this visit:    Pre-op exam  -     Urinalysis with Reflex to Culture; Future  -     CBC with Auto Differential; Future  -     Comprehensive Metabolic Panel; Future  -     HCG Qualitative, Serum; Future  -     Hepatitis C Antibody; Future  -     Protime-INR; Future  -     APTT; Future  -     Thyroid Antibodies; Future  -     T3; Future  -

## 2024-11-20 LAB
ALBUMIN SERPL-MCNC: 4.5 G/DL (ref 3.9–4.9)
ALP SERPL-CCNC: 84 IU/L (ref 44–121)
ALT SERPL-CCNC: 15 IU/L (ref 0–32)
APTT PPP: 29 SEC (ref 24–33)
AST SERPL-CCNC: 19 IU/L (ref 0–40)
B-HCG SERPL QL: NEGATIVE MIU/ML
BASOPHILS # BLD AUTO: 0.1 X10E3/UL (ref 0–0.2)
BASOPHILS NFR BLD AUTO: 2 %
BILIRUB SERPL-MCNC: 0.6 MG/DL (ref 0–1.2)
BUN SERPL-MCNC: 11 MG/DL (ref 6–24)
BUN/CREAT SERPL: 11 (ref 9–23)
CALCIUM SERPL-MCNC: 9.7 MG/DL (ref 8.7–10.2)
CHLORIDE SERPL-SCNC: 100 MMOL/L (ref 96–106)
CO2 SERPL-SCNC: 23 MMOL/L (ref 20–29)
CREAT SERPL-MCNC: 0.98 MG/DL (ref 0.57–1)
EGFRCR SERPLBLD CKD-EPI 2021: 73 ML/MIN/1.73
EOSINOPHIL # BLD AUTO: 0.2 X10E3/UL (ref 0–0.4)
EOSINOPHIL NFR BLD AUTO: 5 %
ERYTHROCYTE [DISTWIDTH] IN BLOOD BY AUTOMATED COUNT: 12.2 % (ref 11.7–15.4)
GLOBULIN SER CALC-MCNC: 3.9 G/DL (ref 1.5–4.5)
GLUCOSE SERPL-MCNC: 79 MG/DL (ref 70–99)
HBA1C MFR BLD: 4.9 % (ref 4.8–5.6)
HBV CORE AB SERPL QL IA: POSITIVE
HBV CORE IGM SERPL QL IA: NEGATIVE
HBV SURFACE AB SER QL: REACTIVE
HBV SURFACE AG SERPL QL IA: NEGATIVE
HCT VFR BLD AUTO: 46.7 % (ref 34–46.6)
HCV AB SERPL QL IA: NORMAL
HCV IGG SERPL QL IA: NON REACTIVE
HGB BLD-MCNC: 14.8 G/DL (ref 11.1–15.9)
HIV 1+2 AB+HIV1 P24 AG SERPL QL IA: NON REACTIVE
IMM GRANULOCYTES # BLD AUTO: 0 X10E3/UL (ref 0–0.1)
IMM GRANULOCYTES NFR BLD AUTO: 0 %
INR PPP: 1.1 (ref 0.9–1.2)
LYMPHOCYTES # BLD AUTO: 2.1 X10E3/UL (ref 0.7–3.1)
LYMPHOCYTES NFR BLD AUTO: 47 %
MCH RBC QN AUTO: 26.6 PG (ref 26.6–33)
MCHC RBC AUTO-ENTMCNC: 31.7 G/DL (ref 31.5–35.7)
MCV RBC AUTO: 84 FL (ref 79–97)
MONOCYTES # BLD AUTO: 0.5 X10E3/UL (ref 0.1–0.9)
MONOCYTES NFR BLD AUTO: 11 %
NEUTROPHILS # BLD AUTO: 1.6 X10E3/UL (ref 1.4–7)
NEUTROPHILS NFR BLD AUTO: 35 %
PLATELET # BLD AUTO: 450 X10E3/UL (ref 150–450)
POTASSIUM SERPL-SCNC: 4 MMOL/L (ref 3.5–5.2)
PROT SERPL-MCNC: 8.4 G/DL (ref 6–8.5)
PROTHROMBIN TIME: 11.9 SEC (ref 9.1–12)
RBC # BLD AUTO: 5.56 X10E6/UL (ref 3.77–5.28)
SODIUM SERPL-SCNC: 138 MMOL/L (ref 134–144)
T3 SERPL-MCNC: 115 NG/DL (ref 71–180)
T4 FREE SERPL-MCNC: 1.34 NG/DL (ref 0.82–1.77)
THYROGLOB AB SERPL-ACNC: <1 IU/ML (ref 0–0.9)
THYROPEROXIDASE AB SERPL-ACNC: 18 IU/ML (ref 0–34)
WBC # BLD AUTO: 4.6 X10E3/UL (ref 3.4–10.8)

## 2024-11-21 ENCOUNTER — CLINICAL DOCUMENTATION (OUTPATIENT)
Age: 44
End: 2024-11-21

## 2024-11-23 LAB
APPEARANCE UR: CLEAR
BACTERIA #/AREA URNS HPF: ABNORMAL /[HPF]
BACTERIA UR CULT: ABNORMAL
BILIRUB UR QL STRIP: NEGATIVE
CASTS URNS QL MICRO: ABNORMAL /LPF
COLOR UR: YELLOW
EPI CELLS #/AREA URNS HPF: ABNORMAL /HPF (ref 0–10)
GLUCOSE UR QL STRIP: NEGATIVE
HGB UR QL STRIP: NEGATIVE
KETONES UR QL STRIP: NEGATIVE
LEUKOCYTE ESTERASE UR QL STRIP: NEGATIVE
MICRO URNS: NORMAL
MICRO URNS: NORMAL
NITRITE UR QL STRIP: NEGATIVE
PH UR STRIP: 6 [PH] (ref 5–7.5)
PROT UR QL STRIP: NEGATIVE
RBC #/AREA URNS HPF: ABNORMAL /HPF (ref 0–2)
SP GR UR STRIP: 1.01 (ref 1–1.03)
URINALYSIS REFLEX: NORMAL
UROBILINOGEN UR STRIP-MCNC: 0.2 MG/DL (ref 0.2–1)
WBC #/AREA URNS HPF: ABNORMAL /HPF (ref 0–5)

## 2024-11-25 ENCOUNTER — TELEPHONE (OUTPATIENT)
Age: 44
End: 2024-11-25

## 2024-11-25 RX ORDER — NITROFURANTOIN 25; 75 MG/1; MG/1
100 CAPSULE ORAL 2 TIMES DAILY
Qty: 14 CAPSULE | Refills: 0 | Status: SHIPPED | OUTPATIENT
Start: 2024-11-25 | End: 2024-12-02

## 2024-11-25 NOTE — TELEPHONE ENCOUNTER
----- Message from MICAH Pineda NP sent at 11/24/2024  7:57 AM EST -----  Let patient know she has a UTI. Send in Macrobid 100 mg BID for 7 days

## 2024-12-03 ENCOUNTER — TELEPHONE (OUTPATIENT)
Age: 44
End: 2024-12-03

## 2024-12-03 NOTE — TELEPHONE ENCOUNTER
Called patient to inquire about the date of her surgery, it was cancelled. And informed her that she should cone to an in office visit to be assessed and evaluated to order the appropriate labs and images if needed. Patient is currently out of town and will go to urgentcare.

## 2024-12-20 DIAGNOSIS — I10 ESSENTIAL HYPERTENSION: ICD-10-CM

## 2024-12-20 RX ORDER — HYDROCHLOROTHIAZIDE 25 MG/1
25 TABLET ORAL DAILY
Qty: 90 TABLET | Refills: 0 | Status: SHIPPED | OUTPATIENT
Start: 2024-12-20

## 2024-12-20 RX ORDER — AMLODIPINE BESYLATE 10 MG/1
10 TABLET ORAL DAILY
Qty: 90 TABLET | Refills: 0 | Status: SHIPPED | OUTPATIENT
Start: 2024-12-20

## 2024-12-20 RX ORDER — AMLODIPINE BESYLATE 10 MG/1
10 TABLET ORAL DAILY
Qty: 90 TABLET | Refills: 0 | OUTPATIENT
Start: 2024-12-20

## 2025-02-06 ENCOUNTER — OFFICE VISIT (OUTPATIENT)
Age: 45
End: 2025-02-06
Payer: MEDICAID

## 2025-02-06 VITALS
SYSTOLIC BLOOD PRESSURE: 115 MMHG | HEART RATE: 64 BPM | OXYGEN SATURATION: 95 % | BODY MASS INDEX: 38.3 KG/M2 | DIASTOLIC BLOOD PRESSURE: 76 MMHG | TEMPERATURE: 97.8 F | HEIGHT: 71 IN | RESPIRATION RATE: 16 BRPM | WEIGHT: 273.6 LBS

## 2025-02-06 DIAGNOSIS — N76.0 ACUTE VAGINITIS: Primary | ICD-10-CM

## 2025-02-06 PROCEDURE — 3078F DIAST BP <80 MM HG: CPT | Performed by: OBSTETRICS & GYNECOLOGY

## 2025-02-06 PROCEDURE — 3074F SYST BP LT 130 MM HG: CPT | Performed by: OBSTETRICS & GYNECOLOGY

## 2025-02-06 PROCEDURE — 99212 OFFICE O/P EST SF 10 MIN: CPT | Performed by: OBSTETRICS & GYNECOLOGY

## 2025-02-06 SDOH — ECONOMIC STABILITY: FOOD INSECURITY: WITHIN THE PAST 12 MONTHS, YOU WORRIED THAT YOUR FOOD WOULD RUN OUT BEFORE YOU GOT MONEY TO BUY MORE.: NEVER TRUE

## 2025-02-06 SDOH — ECONOMIC STABILITY: FOOD INSECURITY: WITHIN THE PAST 12 MONTHS, THE FOOD YOU BOUGHT JUST DIDN'T LAST AND YOU DIDN'T HAVE MONEY TO GET MORE.: NEVER TRUE

## 2025-02-06 ASSESSMENT — PATIENT HEALTH QUESTIONNAIRE - PHQ9
SUM OF ALL RESPONSES TO PHQ QUESTIONS 1-9: 0
SUM OF ALL RESPONSES TO PHQ9 QUESTIONS 1 & 2: 0
SUM OF ALL RESPONSES TO PHQ QUESTIONS 1-9: 0
1. LITTLE INTEREST OR PLEASURE IN DOING THINGS: NOT AT ALL
SUM OF ALL RESPONSES TO PHQ QUESTIONS 1-9: 0
2. FEELING DOWN, DEPRESSED OR HOPELESS: NOT AT ALL
SUM OF ALL RESPONSES TO PHQ QUESTIONS 1-9: 0

## 2025-02-06 NOTE — PROGRESS NOTES
Earnestine Tavares is a 44 y.o. female here for   Chief Complaint   Patient presents with    Vaginal Discharge    Follow-up     Hysterectomy       All medication reviewed.     Vitals:    02/06/25 0835   BP: 115/76   Site: Right Upper Arm   Position: Sitting   Cuff Size: Large Adult   Pulse: 64   Resp: 16   Temp: 97.8 °F (36.6 °C)   SpO2: 95%   Weight: 124.1 kg (273 lb 9.6 oz)   Height: 1.803 m (5' 11\")        1. Have you been to the ER, urgent care clinic since your last visit?  Hospitalized since your last visit? -No    2. Have you seen or consulted any other health care providers outside of the [facility] since your last visit?  Include any pap smears or colon screening.-No

## 2025-02-06 NOTE — PROGRESS NOTES
Vaginitis/Vulvitis Problem Visit    Chief Complaint:   Chief Complaint   Patient presents with    Vaginal Discharge    Follow-up     Hysterectomy        HPI:    Earnestine Tavares is a 44 y.o.  female who presents for the evaluation of vaginal discharge. Pt reports the problem started  several  day(s) ago and describes it as thin.  She had the following risk factors: none.  STD screening offered and patient wants vaginal STD testing. Pt is getting ready for a cruise and wanted to make sure she did not have a vaginal infection.      LMP: Patient's last menstrual period was 2024. Hysterectomy     Past Medical History:     Past Medical History:   Diagnosis Date    Gall stone     GERD (gastroesophageal reflux disease)     Heavy menstrual bleeding     Helicobacter pylori ab+ 2018    Hypertension     Migraine without status migrainosus, not intractable, unspecified migraine type 2022    Obesity     Uterine fibroid         Past Surgical History:   Procedure Laterality Date    ABDOMINOPLASTY      BREAST ENHANCEMENT SURGERY Bilateral     silicone, IMPLANTS    CARPAL TUNNEL RELEASE      CHOLECYSTECTOMY      COLONOSCOPY      DILATION AND CURETTAGE OF UTERUS N/A 2024    HYSTEROSCOPY DILATATION AND CURETTAGE, polypectomy WITH novasure ENDOMETRIAL ABLATION performed by Chino Berumen MD at [facility] MAIN OR    ENDOMETRIAL ABLATION  2024    ENDOSCOPY, COLON, DIAGNOSTIC      HYSTERECTOMY (CERVIX STATUS UNKNOWN) N/A 2024    ROBOTIC ASSISTED TOTAL LAPAROSCOPIC HYSTERECTOMY, BILATERAL SALPINGECTOMY, CYSTOSCOPY performed by Chino Berumen MD at [facility] MAIN OR    HYSTEROSCOPY W/ POLYPECTOMY  2024    with novasure endometrial ablation    IR CHOLECYSTOSTOMY PERCUTANEOUS COMPLETE      LIPOSUCTION      back, arms    ROBOTIC ASSISTED HYSTERECTOMY  2024    With bilateral salpingectomy, cystoscopy. Done for hyperplasia noted at endometrial ablation    TUBAL LIGATION

## 2025-02-12 LAB
A VAGINAE DNA VAG QL NAA+PROBE: ABNORMAL SCORE
BVAB2 DNA VAG QL NAA+PROBE: ABNORMAL SCORE
C ALBICANS DNA VAG QL NAA+PROBE: NEGATIVE
C GLABRATA DNA VAG QL NAA+PROBE: NEGATIVE
C TRACH DNA SPEC QL NAA+PROBE: NEGATIVE
MEGA1 DNA VAG QL NAA+PROBE: ABNORMAL SCORE
N GONORRHOEA DNA VAG QL NAA+PROBE: NEGATIVE
T VAGINALIS DNA VAG QL NAA+PROBE: NEGATIVE

## 2025-02-12 RX ORDER — METRONIDAZOLE 500 MG/1
500 TABLET ORAL 2 TIMES DAILY
Qty: 14 TABLET | Refills: 0 | Status: SHIPPED | OUTPATIENT
Start: 2025-02-12 | End: 2025-02-19

## 2025-03-24 ENCOUNTER — TELEPHONE (OUTPATIENT)
Age: 45
End: 2025-03-24

## 2025-03-24 DIAGNOSIS — I10 ESSENTIAL HYPERTENSION: ICD-10-CM

## 2025-03-24 RX ORDER — HYDROCHLOROTHIAZIDE 25 MG/1
25 TABLET ORAL DAILY
Qty: 90 TABLET | Refills: 1 | Status: SHIPPED | OUTPATIENT
Start: 2025-03-24

## 2025-03-24 RX ORDER — AMLODIPINE BESYLATE 10 MG/1
10 TABLET ORAL DAILY
Qty: 90 TABLET | Refills: 1 | Status: SHIPPED | OUTPATIENT
Start: 2025-03-24

## 2025-03-24 NOTE — TELEPHONE ENCOUNTER
Medication(s):  amLODIPine (NORVASC) 10 MG tablet     hydroCHLOROthiazide (HYDRODIURIL) 25 MG tablet     Last OV: 11/19/2024  Next OV:  06/16/2025    Pharmacy: [place] [id number] [address] [phone] - F [phone] [[id number]]

## 2025-03-26 NOTE — ED NOTES
3/26 - Scheduled for 4/8 / cm    Patient discharged by MD. Results and discharge instructions reviewed with the patient by MD. Patient verbalizes understanding. Patient discharged home, stable, ambulatory, in no acute distress.

## 2025-04-16 RX ORDER — FLUCONAZOLE 150 MG/1
150 TABLET ORAL ONCE
Qty: 1 TABLET | Refills: 0 | Status: SHIPPED | OUTPATIENT
Start: 2025-04-16 | End: 2025-04-16

## 2025-05-02 ENCOUNTER — TELEPHONE (OUTPATIENT)
Age: 45
End: 2025-05-02

## 2025-05-02 NOTE — TELEPHONE ENCOUNTER
Returned her call. Explained that labs should be placed after May/26/2025. She verbalized understanding and appreciation.

## 2025-05-09 ENCOUNTER — CLINICAL DOCUMENTATION (OUTPATIENT)
Age: 45
End: 2025-05-09

## 2025-05-09 NOTE — PROGRESS NOTES
Spoke to patient regarding request for an appointment on 5/15/25  No open appointments for that date. Pt will call Tuesday 5/13 to check for cancellations

## 2025-06-05 ENCOUNTER — TELEPHONE (OUTPATIENT)
Age: 45
End: 2025-06-05

## 2025-06-05 NOTE — TELEPHONE ENCOUNTER
Spoke to patient about her missed appt. Patient stated that she tried to cancel her appt via [employer] this morning, but was unable to do so (transportation issues this morning).     No show letter sent via [employer].

## 2025-06-10 ENCOUNTER — OFFICE VISIT (OUTPATIENT)
Age: 45
End: 2025-06-10
Payer: MEDICAID

## 2025-06-10 VITALS
HEART RATE: 63 BPM | RESPIRATION RATE: 16 BRPM | BODY MASS INDEX: 35 KG/M2 | WEIGHT: 250 LBS | SYSTOLIC BLOOD PRESSURE: 100 MMHG | OXYGEN SATURATION: 97 % | HEIGHT: 71 IN | TEMPERATURE: 98.3 F | DIASTOLIC BLOOD PRESSURE: 62 MMHG

## 2025-06-10 DIAGNOSIS — R19.7 DIARRHEA, UNSPECIFIED TYPE: ICD-10-CM

## 2025-06-10 DIAGNOSIS — Z86.19 HISTORY OF HELICOBACTER PYLORI INFECTION: ICD-10-CM

## 2025-06-10 DIAGNOSIS — E66.811 OBESITY (BMI 30.0-34.9): ICD-10-CM

## 2025-06-10 DIAGNOSIS — Z01.818 PRE-OP TESTING: ICD-10-CM

## 2025-06-10 DIAGNOSIS — R10.9 ABDOMINAL CRAMPS: ICD-10-CM

## 2025-06-10 DIAGNOSIS — Z01.818 PRE-OP TESTING: Primary | ICD-10-CM

## 2025-06-10 DIAGNOSIS — I10 ESSENTIAL HYPERTENSION: ICD-10-CM

## 2025-06-10 PROCEDURE — 3074F SYST BP LT 130 MM HG: CPT | Performed by: INTERNAL MEDICINE

## 2025-06-10 PROCEDURE — 99214 OFFICE O/P EST MOD 30 MIN: CPT | Performed by: INTERNAL MEDICINE

## 2025-06-10 PROCEDURE — 3078F DIAST BP <80 MM HG: CPT | Performed by: INTERNAL MEDICINE

## 2025-06-10 RX ORDER — DICYCLOMINE HYDROCHLORIDE 10 MG/1
10 CAPSULE ORAL
Qty: 120 CAPSULE | Refills: 0 | Status: SHIPPED | OUTPATIENT
Start: 2025-06-10

## 2025-06-10 ASSESSMENT — ENCOUNTER SYMPTOMS
DIARRHEA: 1
ABDOMINAL PAIN: 1
RESPIRATORY NEGATIVE: 1

## 2025-06-10 NOTE — PROGRESS NOTES
abdominal pain.    Diagnoses and all orders for this visit:    Pre-op testing  -     CBC with Auto Differential; Future  -     Urinalysis with Microscopic; Future  -     Comprehensive Metabolic Panel; Future  -     Hepatitis Panel, Acute; Future  -     Protime-INR; Future  -     Hepatitis C Ab, Rflx to Qt by PCR; Future  -     HIV 1/2 Ag/Ab, 4TH Generation,W Rflx Confirm; Future  -     Hemoglobin A1C; Future  -     HCG Qualitative, Serum; Future  -     APTT; Future  -     XR CHEST (2 VIEWS); Future  -     EKG 12 lead; Future  -     Urinalysis with Microscopic; Future    Abdominal cramps  -     H. Pylori Antigen, Stool; Future  -     dicyclomine (BENTYL) 10 MG capsule; Take 1 capsule by mouth 4 times daily (before meals and nightly)    Diarrhea, unspecified type  -     H. Pylori Antigen, Stool; Future  -     dicyclomine (BENTYL) 10 MG capsule; Take 1 capsule by mouth 4 times daily (before meals and nightly)    History of Helicobacter pylori infection  -     H. Pylori Antigen, Stool; Future    Essential hypertension    Obesity (BMI 30.0-34.9)      Reviewed with patient medication side effects in detail   I answered all patient questions and concerns  Labs and testing done and/or upcoming labs/test were reviewed and discussed   Reviewed and discussed daily activity, exercise and diet      Return in 1 month (on 7/10/2025) for follow up if symptoms persist, follow up for routine visit or before if needed.     MICAH Obando - NP      The patient (or guardian, if applicable) and other individuals in attendance with the patient were advised that Artificial Intelligence will be utilized during this visit to record and process the conversation to generate a clinical note. The patient (or guardian, if applicable) and other individuals in attendance at the appointment consented to the use of AI, including the recording.

## 2025-06-11 ENCOUNTER — HOSPITAL ENCOUNTER (OUTPATIENT)
Facility: HOSPITAL | Age: 45
Discharge: HOME | End: 2025-06-14
Payer: MEDICAID

## 2025-06-11 ENCOUNTER — OFFICE VISIT (OUTPATIENT)
Age: 45
End: 2025-06-11
Payer: MEDICAID

## 2025-06-11 ENCOUNTER — RESULTS FOLLOW-UP (OUTPATIENT)
Age: 45
End: 2025-06-11

## 2025-06-11 ENCOUNTER — HOSPITAL ENCOUNTER (EMERGENCY)
Facility: HOSPITAL | Age: 45
Discharge: HOME | End: 2025-06-11
Attending: EMERGENCY MEDICINE
Payer: MEDICAID

## 2025-06-11 VITALS
HEART RATE: 80 BPM | OXYGEN SATURATION: 100 % | HEIGHT: 71 IN | DIASTOLIC BLOOD PRESSURE: 103 MMHG | RESPIRATION RATE: 18 BRPM | BODY MASS INDEX: 35 KG/M2 | WEIGHT: 250 LBS | TEMPERATURE: 98.1 F | SYSTOLIC BLOOD PRESSURE: 152 MMHG

## 2025-06-11 VITALS
OXYGEN SATURATION: 97 % | RESPIRATION RATE: 16 BRPM | SYSTOLIC BLOOD PRESSURE: 126 MMHG | TEMPERATURE: 98 F | BODY MASS INDEX: 35.28 KG/M2 | HEIGHT: 71 IN | DIASTOLIC BLOOD PRESSURE: 80 MMHG | HEART RATE: 76 BPM | WEIGHT: 252 LBS

## 2025-06-11 DIAGNOSIS — I10 ESSENTIAL HYPERTENSION: ICD-10-CM

## 2025-06-11 DIAGNOSIS — Z01.818 PRE-OP TESTING: ICD-10-CM

## 2025-06-11 DIAGNOSIS — R10.33 PERIUMBILICAL ABDOMINAL PAIN: ICD-10-CM

## 2025-06-11 DIAGNOSIS — K43.2 INCISIONAL HERNIA, WITHOUT OBSTRUCTION OR GANGRENE: ICD-10-CM

## 2025-06-11 DIAGNOSIS — N76.1 CHRONIC VAGINITIS: Primary | ICD-10-CM

## 2025-06-11 DIAGNOSIS — R19.7 DIARRHEA, UNSPECIFIED TYPE: ICD-10-CM

## 2025-06-11 DIAGNOSIS — K43.9 VENTRAL HERNIA WITHOUT OBSTRUCTION OR GANGRENE: Primary | ICD-10-CM

## 2025-06-11 DIAGNOSIS — J30.1 SEASONAL ALLERGIC RHINITIS DUE TO POLLEN: ICD-10-CM

## 2025-06-11 LAB
-: NORMAL
ALBUMIN SERPL-MCNC: 4 G/DL (ref 3.5–5)
ALBUMIN/GLOB SERPL: 0.9 (ref 1.1–2.2)
ALP SERPL-CCNC: 116 U/L (ref 45–117)
ALT SERPL-CCNC: 28 U/L (ref 12–78)
ANION GAP SERPL CALC-SCNC: 9 MMOL/L (ref 2–12)
APPEARANCE UR: CLEAR
APTT PPP: 27.1 SEC (ref 22.1–31)
AST SERPL-CCNC: 16 U/L (ref 15–37)
BACTERIA #/AREA URNS HPF: NORMAL /[HPF]
BASOPHILS # BLD: 0.1 K/UL (ref 0–0.1)
BASOPHILS NFR BLD: 2 % (ref 0–1)
BILIRUB SERPL-MCNC: 0.3 MG/DL (ref 0.2–1)
BILIRUB UR QL STRIP: NEGATIVE
BUN SERPL-MCNC: 10 MG/DL (ref 6–20)
BUN/CREAT SERPL: 10 (ref 12–20)
CALCIUM SERPL-MCNC: 9.4 MG/DL (ref 8.5–10.1)
CASTS URNS QL MICRO: NORMAL /LPF
CHLORIDE SERPL-SCNC: 103 MMOL/L (ref 97–108)
CO2 SERPL-SCNC: 24 MMOL/L (ref 21–32)
COLOR UR: YELLOW
CREAT SERPL-MCNC: 0.98 MG/DL (ref 0.55–1.02)
DIFFERENTIAL METHOD BLD: ABNORMAL
EKG ATRIAL RATE: 66 BPM
EKG DIAGNOSIS: NORMAL
EKG P AXIS: 50 DEGREES
EKG P-R INTERVAL: 170 MS
EKG Q-T INTERVAL: 410 MS
EKG QRS DURATION: 90 MS
EKG QTC CALCULATION (BAZETT): 429 MS
EKG R AXIS: 46 DEGREES
EKG T AXIS: 37 DEGREES
EKG VENTRICULAR RATE: 66 BPM
EOSINOPHIL # BLD: 0.32 K/UL (ref 0–0.4)
EOSINOPHIL NFR BLD: 6.3 % (ref 0–7)
EPI CELLS #/AREA URNS HPF: NORMAL /HPF (ref 0–10)
ERYTHROCYTE [DISTWIDTH] IN BLOOD BY AUTOMATED COUNT: 12.7 % (ref 11.5–14.5)
EST. AVERAGE GLUCOSE BLD GHB EST-MCNC: 80 MG/DL
GLOBULIN SER CALC-MCNC: 4.6 G/DL (ref 2–4)
GLUCOSE SERPL-MCNC: 83 MG/DL (ref 65–100)
GLUCOSE UR QL STRIP: NEGATIVE
HAV IGM SER QL: NONREACTIVE
HBA1C MFR BLD: 4.4 % (ref 4–5.6)
HBV CORE IGM SER QL: NONREACTIVE
HBV SURFACE AG SER QL: 0.24 INDEX
HBV SURFACE AG SER QL: NEGATIVE
HCG SERPL QL: NEGATIVE
HCT VFR BLD AUTO: 43.5 % (ref 35–47)
HCV AB SER IA-ACNC: 0.11 INDEX
HCV AB SERPL QL IA: NONREACTIVE
HGB BLD-MCNC: 13.8 G/DL (ref 11.5–16)
HGB UR QL STRIP: NEGATIVE
HIV 1+2 AB+HIV1 P24 AG SERPL QL IA: NONREACTIVE
HIV 1/2 RESULT COMMENT: NORMAL
IMM GRANULOCYTES # BLD AUTO: 0.01 K/UL (ref 0–0.04)
IMM GRANULOCYTES NFR BLD AUTO: 0.2 % (ref 0–0.5)
INR PPP: 1.1 (ref 0.9–1.1)
KETONES UR QL STRIP: NEGATIVE
LEUKOCYTE ESTERASE UR QL STRIP: NEGATIVE
LYMPHOCYTES # BLD: 2.5 K/UL (ref 0.8–3.5)
LYMPHOCYTES NFR BLD: 49.3 % (ref 12–49)
MCH RBC QN AUTO: 26.5 PG (ref 26–34)
MCHC RBC AUTO-ENTMCNC: 31.7 G/DL (ref 30–36.5)
MCV RBC AUTO: 83.7 FL (ref 80–99)
MICRO URNS: NORMAL
MICRO URNS: NORMAL
MONOCYTES # BLD: 0.61 K/UL (ref 0–1)
MONOCYTES NFR BLD: 12 % (ref 5–13)
NEUTS SEG # BLD: 1.53 K/UL (ref 1.8–8)
NEUTS SEG NFR BLD: 30.2 % (ref 32–75)
NITRITE UR QL STRIP: NEGATIVE
NRBC # BLD: 0 K/UL (ref 0–0.01)
NRBC BLD-RTO: 0 PER 100 WBC
PH UR STRIP: 5.5 [PH] (ref 5–7.5)
PLATELET # BLD AUTO: 473 K/UL (ref 150–400)
PMV BLD AUTO: 10.6 FL (ref 8.9–12.9)
POTASSIUM SERPL-SCNC: 4 MMOL/L (ref 3.5–5.1)
PROT SERPL-MCNC: 8.6 G/DL (ref 6.4–8.2)
PROT UR QL STRIP: NEGATIVE
PROTHROMBIN TIME: 11.5 SEC (ref 9.2–11.2)
RBC # BLD AUTO: 5.2 M/UL (ref 3.8–5.2)
RBC #/AREA URNS HPF: NORMAL /HPF (ref 0–2)
SODIUM SERPL-SCNC: 136 MMOL/L (ref 136–145)
SP GR UR STRIP: 1.01 (ref 1–1.03)
THERAPEUTIC RANGE: NORMAL SECS (ref 58–77)
UROBILINOGEN UR STRIP-MCNC: 0.2 MG/DL (ref 0.2–1)
WBC # BLD AUTO: 5.1 K/UL (ref 3.6–11)
WBC #/AREA URNS HPF: NORMAL /HPF (ref 0–5)

## 2025-06-11 PROCEDURE — 71046 X-RAY EXAM CHEST 2 VIEWS: CPT

## 2025-06-11 PROCEDURE — 99283 EMERGENCY DEPT VISIT LOW MDM: CPT

## 2025-06-11 PROCEDURE — 93005 ELECTROCARDIOGRAM TRACING: CPT

## 2025-06-11 PROCEDURE — 3079F DIAST BP 80-89 MM HG: CPT | Performed by: OBSTETRICS & GYNECOLOGY

## 2025-06-11 PROCEDURE — 99214 OFFICE O/P EST MOD 30 MIN: CPT | Performed by: OBSTETRICS & GYNECOLOGY

## 2025-06-11 PROCEDURE — 93010 ELECTROCARDIOGRAM REPORT: CPT | Performed by: INTERNAL MEDICINE

## 2025-06-11 PROCEDURE — 3074F SYST BP LT 130 MM HG: CPT | Performed by: OBSTETRICS & GYNECOLOGY

## 2025-06-11 RX ORDER — AMLODIPINE BESYLATE 10 MG/1
10 TABLET ORAL DAILY
Qty: 90 TABLET | Refills: 1 | Status: SHIPPED | OUTPATIENT
Start: 2025-06-11

## 2025-06-11 RX ORDER — FLUTICASONE PROPIONATE 50 MCG
2 SPRAY, SUSPENSION (ML) NASAL AS NEEDED
Qty: 1 EACH | Refills: 2 | Status: SHIPPED | OUTPATIENT
Start: 2025-06-11

## 2025-06-11 RX ORDER — HYDROCHLOROTHIAZIDE 25 MG/1
25 TABLET ORAL DAILY
Qty: 90 TABLET | Refills: 1 | Status: SHIPPED | OUTPATIENT
Start: 2025-06-11

## 2025-06-11 RX ORDER — ASCORBIC ACID 500 MG
500 TABLET ORAL DAILY
Qty: 90 TABLET | Refills: 1 | Status: SHIPPED | OUTPATIENT
Start: 2025-06-11

## 2025-06-11 RX ORDER — IBUPROFEN 600 MG/1
600 TABLET, FILM COATED ORAL EVERY 6 HOURS PRN
Qty: 20 TABLET | Refills: 1 | Status: SHIPPED | OUTPATIENT
Start: 2025-06-11 | End: 2025-06-21

## 2025-06-11 ASSESSMENT — PAIN DESCRIPTION - ORIENTATION: ORIENTATION: LEFT

## 2025-06-11 ASSESSMENT — PAIN DESCRIPTION - LOCATION: LOCATION: ABDOMEN

## 2025-06-11 ASSESSMENT — PAIN - FUNCTIONAL ASSESSMENT: PAIN_FUNCTIONAL_ASSESSMENT: 0-10

## 2025-06-11 ASSESSMENT — ENCOUNTER SYMPTOMS: ABDOMINAL PAIN: 1

## 2025-06-11 ASSESSMENT — PAIN DESCRIPTION - DESCRIPTORS: DESCRIPTORS: BURNING;SHARP

## 2025-06-11 ASSESSMENT — PAIN SCALES - GENERAL: PAINLEVEL_OUTOF10: 6

## 2025-06-11 NOTE — ED NOTES
Pt presents ambulatory to ED complaining of mid-to-left sided abdominal pain and diarrhea x2-3 weeks. Pt reports hx of H. Pylori. Pt states that she also was just seen by PCP and OB/GYN. Blood work was obtained outpatient but patient reports she has not received results at this time and was referred to ED for possible CT scan. Pt denies nausea, vomiting, or other  symptoms. Pt is alert and oriented x 4, RR even and unlabored, skin is warm and dry. Assesment completed and pt updated on plan of care.       Emergency Department Nursing Plan of Care       The Nursing Plan of Care is developed from the Nursing assessment and Emergency Department Attending provider initial evaluation.  The plan of care may be reviewed in the “ED Provider note”.    The Plan of Care was developed with the following considerations:   Patient / Family readiness to learn indicated by:verbalized understanding  Persons(s) to be included in education: patient  Barriers to Learning/Limitations:None    Signed

## 2025-06-11 NOTE — DISCHARGE INSTRUCTIONS
It was a pleasure taking care of you at [facility] Emergency Department today.      We know that when you come to [facility], you are entrusting us with your health, comfort, and safety.  Our physicians and nurses honor that trust, and we appreciate the opportunity to care for you and your loved ones.  We also value your feedback, and we would like to hear from you.    When you receive a  >>> survey <<< about your Emergency Department experience today, please fill it out. We review every single response from our patients. Thank you!    Sincerely,  Dr. Lex Dawson MD

## 2025-06-11 NOTE — TELEPHONE ENCOUNTER
Lov: 6/10/25  No upcoming appointments    Izabela Echols     Amlodipine  Hydrochlorothiazide  Vit c

## 2025-06-11 NOTE — PROGRESS NOTES
Chief Complaint   Patient presents with    baterial infection        Earnestine Tavares is a 44 y.o. female is being seen for a problem visit. Wanted to know if she could get a ultrasound today for stomach pain. Pt stated pain has been going on for 3 weeks, states that it is sharp cramping on left side.       Ob/Gyn Hx:     -1SAB 4 AB   Patient's last menstrual period was 2024. Partial hysterectomy   Birth Control - no  Hx of STI  - no   Sexually active- yes, male     Health Maintenance:    Last Pap - due  HPV - unsure     1. Have you been to the ER, urgent care clinic, or hospitalized since your last visit?no    2. Have you seen or consulted any other health care providers outside of the [facility] since your last visit? no    Exam chaperoned by:    Nicole Petersen MA

## 2025-06-11 NOTE — ED PROVIDER NOTES
EMERGENCY DEPARTMENT HISTORY AND PHYSICAL EXAM    Date: 2025  Patient Name: Earnestine Tavares  Patient Age and Sex: 44 y.o. female  MRN:  [id number]  CSN:  [id number]    History of Present Illness     Chief Complaint   Patient presents with    Abdominal Pain    Diarrhea       History Provided By: Patient    Ability to gather history was limited by:     HPI: Earnestine Tavares, 44 y.o. female   With history of hypertension, GERD, laparoscopic hysterectomy, complains of frequent episodes in which she feels a bulge under the skin adjacent (left of her umbilicus ) accompanied by mild pain.  This has been going on for at least a month.  Sometimes accompanied by post prandial diarrhea.      Tobacco Use      Smoking status: Former        Packs/day: 0.00        Types: Cigarettes        Quit date: 3/16/2018        Years since quittin.2      Smokeless tobacco: Never     Past History   The patient's medical, surgical, and social history were reviewed by me today.    Current Medications:  No current facility-administered medications on file prior to encounter.     Current Outpatient Medications on File Prior to Encounter   Medication Sig Dispense Refill    amLODIPine (NORVASC) 10 MG tablet Take 1 tablet by mouth daily 90 tablet 1    hydroCHLOROthiazide (HYDRODIURIL) 25 MG tablet Take 1 tablet by mouth daily (Patient not taking: Reported on 2025) 90 tablet 1    vitamin C (ASCORBIC ACID) 500 MG tablet Take 1 tablet by mouth daily 90 tablet 1    dicyclomine (BENTYL) 10 MG capsule Take 1 capsule by mouth 4 times daily (before meals and nightly) 120 capsule 0    fluticasone (FLONASE) 50 MCG/ACT nasal spray 2 sprays by Nasal route as needed for Allergies 1 each 0       Past Medical History:   Diagnosis Date    Gall stone     GERD (gastroesophageal reflux disease)     Heavy menstrual bleeding     Helicobacter pylori ab+ 2018    Hypertension     Migraine without status migrainosus, not intractable, unspecified

## 2025-06-11 NOTE — PROGRESS NOTES
Vaginitis/Vulvitis Problem Visit    Chief Complaint:   Chief Complaint   Patient presents with    baterial infection         HPI:    Earnestine Tavares is a 44 y.o.  female who presents for the evaluation of vaginal discharge. Pt reports the problem started  several days   ago and describes it as white.  She had the following risk factors: none.  STD screening offered and patient wants vaginal STD testing.    Patient notes for the last 3 weeks she's been experiencing griselda-umbilical pain with eating and drinking. This is accompanied by immediate need to have a bowel movement. States she is having diarrhea. She has decreased her eating due to concerns about having diarrhea.     LMP: Patient's last menstrual period was 2024.    Past Medical History:     Past Medical History:   Diagnosis Date    Gall stone     GERD (gastroesophageal reflux disease)     Heavy menstrual bleeding     Helicobacter pylori ab+ 2018    Hypertension     Migraine without status migrainosus, not intractable, unspecified migraine type 2022    Obesity     Uterine fibroid         Past Surgical History:   Procedure Laterality Date    ABDOMINOPLASTY      BREAST ENHANCEMENT SURGERY Bilateral     silicone, IMPLANTS    CARPAL TUNNEL RELEASE      CHOLECYSTECTOMY      COLONOSCOPY      DILATION AND CURETTAGE OF UTERUS N/A 2024    HYSTEROSCOPY DILATATION AND CURETTAGE, polypectomy WITH novasure ENDOMETRIAL ABLATION performed by Chino Berumen MD at [facility] MAIN OR    ENDOMETRIAL ABLATION  2024    ENDOSCOPY, COLON, DIAGNOSTIC      HYSTERECTOMY (CERVIX STATUS UNKNOWN) N/A 2024    ROBOTIC ASSISTED TOTAL LAPAROSCOPIC HYSTERECTOMY, BILATERAL SALPINGECTOMY, CYSTOSCOPY performed by Chino Berumen MD at [facility] MAIN OR    HYSTEROSCOPY W/ POLYPECTOMY  2024    with novasure endometrial ablation    IR CHOLECYSTOSTOMY PERCUTANEOUS COMPLETE      LIPOSUCTION      back, arms    ROBOTIC ASSISTED HYSTERECTOMY

## 2025-06-11 NOTE — ED TRIAGE NOTES
Pt presents ambulatory to triage complaining of sharp burning pain on the left side of her abdomen after eating and diarrhea x 3 weeks. Pt denies N/V or urinary sx at this time.

## 2025-06-11 NOTE — ED NOTES
Discharge instructions were given to the patient by CHACHO Person.     The patient left the Emergency Department alert and oriented and in no acute distress with 1 prescriptions. The patient was encouraged to call or return to the ED for worsening issues or problems and was encouraged to schedule a follow up appointment for continuing care.     Ambulation assessment completed before discharge.  Pt left Emergency Department ambulating at baseline with no ortho devices  Ortho device education: none    The patient verbalized understanding of discharge instructions and prescriptions, all questions were answered. The patient has no further concerns at this time.

## 2025-06-13 ENCOUNTER — PATIENT MESSAGE (OUTPATIENT)
Age: 45
End: 2025-06-13

## 2025-06-13 DIAGNOSIS — Z01.818 PRE-OP EXAM: ICD-10-CM

## 2025-06-13 DIAGNOSIS — R79.89 ABNORMAL CBC: ICD-10-CM

## 2025-06-13 DIAGNOSIS — Z01.818 PRE-OP EXAM: Primary | ICD-10-CM

## 2025-06-13 LAB
H PYLORI AG STL QL IA: NEGATIVE
HCV AB SERPL QL IA: NORMAL
HCV IGG SERPL QL IA: NON REACTIVE S/CO RATIO

## 2025-06-14 LAB
BASOPHILS # BLD AUTO: 0.1 X10E3/UL (ref 0–0.2)
BASOPHILS NFR BLD AUTO: 1 %
EOSINOPHIL # BLD AUTO: 0.2 X10E3/UL (ref 0–0.4)
EOSINOPHIL NFR BLD AUTO: 4 %
ERYTHROCYTE [DISTWIDTH] IN BLOOD BY AUTOMATED COUNT: 12.1 % (ref 11.7–15.4)
HCT VFR BLD AUTO: 42.4 % (ref 34–46.6)
HGB BLD-MCNC: 13.2 G/DL (ref 11.1–15.9)
IMM GRANULOCYTES # BLD AUTO: 0 X10E3/UL (ref 0–0.1)
IMM GRANULOCYTES NFR BLD AUTO: 0 %
LYMPHOCYTES # BLD AUTO: 2.8 X10E3/UL (ref 0.7–3.1)
LYMPHOCYTES NFR BLD AUTO: 42 %
MCH RBC QN AUTO: 26.7 PG (ref 26.6–33)
MCHC RBC AUTO-ENTMCNC: 31.1 G/DL (ref 31.5–35.7)
MCV RBC AUTO: 86 FL (ref 79–97)
MONOCYTES # BLD AUTO: 0.8 X10E3/UL (ref 0.1–0.9)
MONOCYTES NFR BLD AUTO: 12 %
NEUTROPHILS # BLD AUTO: 2.7 X10E3/UL (ref 1.4–7)
NEUTROPHILS NFR BLD AUTO: 41 %
PLATELET # BLD AUTO: 433 X10E3/UL (ref 150–450)
RBC # BLD AUTO: 4.95 X10E6/UL (ref 3.77–5.28)
WBC # BLD AUTO: 6.6 X10E3/UL (ref 3.4–10.8)

## 2025-06-15 ENCOUNTER — RESULTS FOLLOW-UP (OUTPATIENT)
Age: 45
End: 2025-06-15

## 2025-06-15 LAB
A VAGINAE DNA VAG QL NAA+PROBE: ABNORMAL SCORE
BVAB2 DNA VAG QL NAA+PROBE: ABNORMAL SCORE
C ALBICANS DNA VAG QL NAA+PROBE: NEGATIVE
C GLABRATA DNA VAG QL NAA+PROBE: NEGATIVE
C TRACH RRNA SPEC QL NAA+PROBE: NEGATIVE
MEGA1 DNA VAG QL NAA+PROBE: ABNORMAL SCORE
N GONORRHOEA RRNA SPEC QL NAA+PROBE: NEGATIVE
SPECIMEN SOURCE: ABNORMAL
T VAGINALIS RRNA SPEC QL NAA+PROBE: NEGATIVE

## 2025-06-16 ENCOUNTER — RESULTS FOLLOW-UP (OUTPATIENT)
Age: 45
End: 2025-06-16

## 2025-06-16 RX ORDER — METRONIDAZOLE 500 MG/1
500 TABLET ORAL 2 TIMES DAILY
Qty: 14 TABLET | Refills: 0 | Status: SHIPPED | OUTPATIENT
Start: 2025-06-16 | End: 2025-06-23

## 2025-06-16 RX ORDER — METRONIDAZOLE 7.5 MG/G
1 GEL VAGINAL WEEKLY
Qty: 70 G | Refills: 0 | Status: SHIPPED | OUTPATIENT
Start: 2025-06-16 | End: 2025-07-23 | Stop reason: SDUPTHER

## 2025-06-24 ENCOUNTER — OFFICE VISIT (OUTPATIENT)
Age: 45
End: 2025-06-24
Payer: MEDICAID

## 2025-06-24 VITALS
TEMPERATURE: 98.3 F | OXYGEN SATURATION: 96 % | RESPIRATION RATE: 17 BRPM | BODY MASS INDEX: 35.25 KG/M2 | WEIGHT: 251.8 LBS | HEART RATE: 73 BPM | SYSTOLIC BLOOD PRESSURE: 107 MMHG | HEIGHT: 71 IN | DIASTOLIC BLOOD PRESSURE: 75 MMHG

## 2025-06-24 DIAGNOSIS — K43.9 VENTRAL HERNIA WITHOUT OBSTRUCTION OR GANGRENE: Primary | ICD-10-CM

## 2025-06-24 PROCEDURE — 99203 OFFICE O/P NEW LOW 30 MIN: CPT | Performed by: SURGERY

## 2025-06-24 PROCEDURE — 3078F DIAST BP <80 MM HG: CPT | Performed by: SURGERY

## 2025-06-24 PROCEDURE — 3074F SYST BP LT 130 MM HG: CPT | Performed by: SURGERY

## 2025-06-24 RX ORDER — METRONIDAZOLE 7.5 MG/G
1 GEL VAGINAL DAILY
Qty: 70 G | Refills: 0 | Status: SHIPPED | OUTPATIENT
Start: 2025-06-24 | End: 2025-06-29

## 2025-06-24 ASSESSMENT — PATIENT HEALTH QUESTIONNAIRE - PHQ9
SUM OF ALL RESPONSES TO PHQ QUESTIONS 1-9: 0
1. LITTLE INTEREST OR PLEASURE IN DOING THINGS: NOT AT ALL
2. FEELING DOWN, DEPRESSED OR HOPELESS: NOT AT ALL

## 2025-06-24 ASSESSMENT — ENCOUNTER SYMPTOMS
DIARRHEA: 1
CONSTIPATION: 0
NAUSEA: 0
ABDOMINAL PAIN: 1
VOMITING: 0

## 2025-06-24 NOTE — PROGRESS NOTES
Earnestine Tavares is a 44 y.o. female who is referred by the [facility] ER for further evaluation of a ventral hernia.     Information obtained from patient and review of chart.     Ms. Tavares tells me that she recently noted an abdominal wall bulge at the umbilicus. No significant change in the size of the bulge. No associated nausea or vomiting. No h/o chronic cough or constipation. Seen in the ER on June 11, where a ventral hernia at the level of the umbilicus was noted on exam.   She has otherwise been in her usual state of health.     Past Medical History:   Diagnosis Date    Gall stone 2019    GERD (gastroesophageal reflux disease)     Heavy menstrual bleeding 2024    Helicobacter pylori ab+ 05/21/2018    Hypertension     Migraine without status migrainosus, not intractable, unspecified migraine type 02/23/2022    Obesity     Uterine fibroid     Ventral hernia without obstruction or gangrene 06/24/2025     Past Surgical History:   Procedure Laterality Date    ABDOMINOPLASTY      BREAST ENHANCEMENT SURGERY Bilateral 2021    silicone, IMPLANTS    CARPAL TUNNEL RELEASE      CHOLECYSTECTOMY  2020    COLONOSCOPY      DILATION AND CURETTAGE OF UTERUS N/A 05/24/2024    HYSTEROSCOPY DILATATION AND CURETTAGE, polypectomy WITH novasure ENDOMETRIAL ABLATION performed by Chino Berumen MD at [facility] MAIN OR    ENDOMETRIAL ABLATION  05/24/2024    ENDOSCOPY, COLON, DIAGNOSTIC      HYSTERECTOMY (CERVIX STATUS UNKNOWN) N/A 07/12/2024    ROBOTIC ASSISTED TOTAL LAPAROSCOPIC HYSTERECTOMY, BILATERAL SALPINGECTOMY, CYSTOSCOPY performed by Chino Berumen MD at [facility] MAIN OR    HYSTEROSCOPY W/ POLYPECTOMY  05/24/2024    with novasure endometrial ablation    IR CHOLECYSTOSTOMY PERCUTANEOUS COMPLETE      LIPOSUCTION      back, arms    ROBOTIC ASSISTED HYSTERECTOMY  07/12/2024    With bilateral salpingectomy, cystoscopy. Done for hyperplasia noted at endometrial ablation    TUBAL LIGATION  2000    US ASP BREAST CYST LEFT Left 04/11/2022

## 2025-06-24 NOTE — PROGRESS NOTES
Earnestine Tavares is a 44 y.o. female  Chief Complaint   Patient presents with    New Patient    Hernia     /75 (BP Site: Left Upper Arm, Patient Position: Sitting)   Pulse 73   Temp 98.3 °F (36.8 °C) (Oral)   Resp 17   Ht 1.803 m (5' 11\")   Wt 114.2 kg (251 lb 12.8 oz)   LMP 05/19/2024   SpO2 96%   BMI 35.12 kg/m²     1. Have you been to the ER, urgent care clinic since your last visit?  Hospitalized since your last visit?no    2. Have you seen or consulted any other health care providers outside of the [facility] since your last visit?  Include any pap smears or colon screening. no

## 2025-06-30 ENCOUNTER — TELEPHONE (OUTPATIENT)
Age: 45
End: 2025-06-30

## 2025-06-30 NOTE — TELEPHONE ENCOUNTER
Pt is requesting recent Lab Results to be sent to Surgical Coordinator    Fax Number: [phone]

## 2025-07-01 ENCOUNTER — TELEPHONE (OUTPATIENT)
Age: 45
End: 2025-07-01

## 2025-07-01 DIAGNOSIS — K43.9 VENTRAL HERNIA WITHOUT OBSTRUCTION OR GANGRENE: Primary | ICD-10-CM

## 2025-07-01 NOTE — TELEPHONE ENCOUNTER
[facility] CT department called to get the order updated to say with contrast instead of with and without due to the diagnosis.   abdominal pain

## 2025-07-02 ENCOUNTER — PATIENT MESSAGE (OUTPATIENT)
Age: 45
End: 2025-07-02

## 2025-07-03 ENCOUNTER — HOSPITAL ENCOUNTER (OUTPATIENT)
Facility: HOSPITAL | Age: 45
Discharge: HOME | End: 2025-07-03
Attending: SURGERY
Payer: MEDICAID

## 2025-07-03 DIAGNOSIS — K43.9 VENTRAL HERNIA WITHOUT OBSTRUCTION OR GANGRENE: ICD-10-CM

## 2025-07-03 PROCEDURE — 74177 CT ABD & PELVIS W/CONTRAST: CPT

## 2025-07-03 PROCEDURE — 6360000004 HC RX CONTRAST MEDICATION: Performed by: SURGERY

## 2025-07-03 RX ORDER — IOPAMIDOL 755 MG/ML
100 INJECTION, SOLUTION INTRAVASCULAR
Status: COMPLETED | OUTPATIENT
Start: 2025-07-03 | End: 2025-07-03

## 2025-07-03 RX ADMIN — IOPAMIDOL 100 ML: 755 INJECTION, SOLUTION INTRAVENOUS at 10:09

## 2025-07-07 ENCOUNTER — TELEPHONE (OUTPATIENT)
Age: 45
End: 2025-07-07

## 2025-07-07 NOTE — TELEPHONE ENCOUNTER
Patient called and stated that she received the results on Mychart for her imaging but she would like to get a call back to review them.

## 2025-07-08 ENCOUNTER — OFFICE VISIT (OUTPATIENT)
Age: 45
End: 2025-07-08
Payer: MEDICAID

## 2025-07-08 VITALS
SYSTOLIC BLOOD PRESSURE: 117 MMHG | OXYGEN SATURATION: 97 % | DIASTOLIC BLOOD PRESSURE: 81 MMHG | HEIGHT: 71 IN | TEMPERATURE: 97.8 F | RESPIRATION RATE: 15 BRPM | WEIGHT: 252 LBS | BODY MASS INDEX: 35.28 KG/M2 | HEART RATE: 74 BPM

## 2025-07-08 DIAGNOSIS — K43.9 VENTRAL HERNIA WITHOUT OBSTRUCTION OR GANGRENE: Primary | ICD-10-CM

## 2025-07-08 PROCEDURE — 3074F SYST BP LT 130 MM HG: CPT | Performed by: SURGERY

## 2025-07-08 PROCEDURE — 99212 OFFICE O/P EST SF 10 MIN: CPT | Performed by: SURGERY

## 2025-07-08 PROCEDURE — 3079F DIAST BP 80-89 MM HG: CPT | Performed by: SURGERY

## 2025-07-08 ASSESSMENT — ENCOUNTER SYMPTOMS
NAUSEA: 0
ABDOMINAL PAIN: 1
VOMITING: 0

## 2025-07-08 ASSESSMENT — PATIENT HEALTH QUESTIONNAIRE - PHQ9
2. FEELING DOWN, DEPRESSED OR HOPELESS: NOT AT ALL
SUM OF ALL RESPONSES TO PHQ QUESTIONS 1-9: 0
1. LITTLE INTEREST OR PLEASURE IN DOING THINGS: NOT AT ALL

## 2025-07-08 NOTE — PROGRESS NOTES
Identified patient with two patient identifiers (name and ). Reviewed chart in preparation for visit and have obtained necessary documentation.    Earnestine Tavares is a 44 y.o. female  Chief Complaint   Patient presents with    Results     /81 (BP Site: Left Upper Arm, Patient Position: Sitting, BP Cuff Size: Large Adult)   Pulse 74   Temp 97.8 °F (36.6 °C) (Oral)   Resp 15   Ht 1.803 m (5' 11\")   Wt 114.3 kg (252 lb)   LMP 2024   SpO2 97%   BMI 35.15 kg/m²     1. Have you been to the ER, urgent care clinic since your last visit?  Hospitalized since your last visit?no    2. Have you seen or consulted any other health care providers outside of the [facility] since your last visit?  Include any pap smears or colon screening. no

## 2025-07-08 NOTE — PROGRESS NOTES
Earnestine Tavares is a 44 y.o. female who returns following CT scan of the abdomen/pelvis.     Ms. Tavares was last seen on June 24, 2025 for further evaluation of a ventral hernia. Doing well since then. No significant change in periumbilical discomfort. Ms. Tavares has no other complaints.   She has otherwise been in her usual state of health.     CT scan abdomen/pelvis with IV contrast - 7/4/2025 - Mild intrahepatic biliary dilatation and common bile duct dilatation, likely reflecting increased capacitance related to prior cholecystectomy. No bowel obstruction, ileus or perforation. No intra-abdominal abscess. Minimal fat herniation deep to the umbilicus.    Past Medical History:   Diagnosis Date    Gall stone 2019    GERD (gastroesophageal reflux disease)     Heavy menstrual bleeding 2024    Helicobacter pylori ab+ 05/21/2018    Hypertension     Migraine without status migrainosus, not intractable, unspecified migraine type 02/23/2022    Obesity     Uterine fibroid     Ventral hernia without obstruction or gangrene 06/24/2025     Past Surgical History:   Procedure Laterality Date    ABDOMINOPLASTY      BREAST ENHANCEMENT SURGERY Bilateral 2021    silicone, IMPLANTS    CARPAL TUNNEL RELEASE      CHOLECYSTECTOMY  2020    COLONOSCOPY      DILATION AND CURETTAGE OF UTERUS N/A 05/24/2024    HYSTEROSCOPY DILATATION AND CURETTAGE, polypectomy WITH novasure ENDOMETRIAL ABLATION performed by Chino Berumen MD at [facility] MAIN OR    ENDOMETRIAL ABLATION  05/24/2024    ENDOSCOPY, COLON, DIAGNOSTIC      HYSTERECTOMY (CERVIX STATUS UNKNOWN) N/A 07/12/2024    ROBOTIC ASSISTED TOTAL LAPAROSCOPIC HYSTERECTOMY, BILATERAL SALPINGECTOMY, CYSTOSCOPY performed by Chino Berumen MD at [facility] MAIN OR    HYSTEROSCOPY W/ POLYPECTOMY  05/24/2024    with novasure endometrial ablation    IR CHOLECYSTOSTOMY PERCUTANEOUS COMPLETE      LIPOSUCTION      back, arms    ROBOTIC ASSISTED HYSTERECTOMY  07/12/2024    With bilateral salpingectomy,

## 2025-07-23 ENCOUNTER — TELEPHONE (OUTPATIENT)
Age: 45
End: 2025-07-23

## 2025-07-23 RX ORDER — METRONIDAZOLE 7.5 MG/G
1 GEL VAGINAL WEEKLY
Qty: 70 G | Refills: 1 | Status: SHIPPED | OUTPATIENT
Start: 2025-07-23 | End: 2025-10-09

## 2025-07-23 NOTE — PROGRESS NOTES
Called and spoke with pt in reference to her message. She states that she actually left the medication in the hotel she was staying at and was not able to complete the reaming treatment. I informed that I could ask if the provider would be willing to re-send it in for her. Please let me know if there is another course of action we need to take.

## 2025-07-23 NOTE — TELEPHONE ENCOUNTER
Patient called in today trying to schedule an appointment to come in for vaginal issues (BV). No appointments available for patient to come in (per time request). Patient wanted to ask if provider would be willing to resend rx for Flagyl? Stated that she was unable to finish the last course of the medication. Please advise?    Informed patient that she may still have to come in for an appointment, but I would send message back.

## 2025-08-25 ENCOUNTER — OFFICE VISIT (OUTPATIENT)
Age: 45
End: 2025-08-25
Payer: MEDICAID

## 2025-08-25 VITALS
SYSTOLIC BLOOD PRESSURE: 130 MMHG | BODY MASS INDEX: 35.42 KG/M2 | OXYGEN SATURATION: 97 % | HEIGHT: 71 IN | HEART RATE: 58 BPM | DIASTOLIC BLOOD PRESSURE: 80 MMHG | TEMPERATURE: 97.7 F | WEIGHT: 253 LBS

## 2025-08-25 DIAGNOSIS — R39.9 UTI SYMPTOMS: ICD-10-CM

## 2025-08-25 DIAGNOSIS — N76.0 ACUTE VAGINITIS: Primary | ICD-10-CM

## 2025-08-25 PROCEDURE — 99213 OFFICE O/P EST LOW 20 MIN: CPT | Performed by: OBSTETRICS & GYNECOLOGY

## 2025-08-25 PROCEDURE — 3075F SYST BP GE 130 - 139MM HG: CPT | Performed by: OBSTETRICS & GYNECOLOGY

## 2025-08-25 PROCEDURE — 3079F DIAST BP 80-89 MM HG: CPT | Performed by: OBSTETRICS & GYNECOLOGY

## 2025-08-29 ENCOUNTER — TELEPHONE (OUTPATIENT)
Age: 45
End: 2025-08-29

## 2025-09-03 LAB — BACTERIA UR CULT: NO GROWTH

## 2025-09-03 RX ORDER — FLUCONAZOLE 150 MG/1
150 TABLET ORAL ONCE
Qty: 1 TABLET | Refills: 0 | Status: SHIPPED | OUTPATIENT
Start: 2025-09-03 | End: 2025-09-03

## (undated) DEVICE — SOLUTION IRRIG 3000ML 0.9% SOD CHL USP UROMATIC PLAS CONT

## (undated) DEVICE — ELECTRO LUBE IS A SINGLE PATIENT USE DEVICE THAT IS INTENDED TO BE USED ON ELECTROSURGICAL ELECTRODES TO REDUCE STICKING.: Brand: KEY SURGICAL ELECTRO LUBE

## (undated) DEVICE — SYRINGE MED 10ML LUERLOCK TIP W/O SFTY DISP

## (undated) DEVICE — ARM DRAPE

## (undated) DEVICE — X-RAY DETECTABLE SPONGES,16 PLY: Brand: VISTEC

## (undated) DEVICE — SUTURE MONOCRYL + SZ 4-0 L27IN ABSRB UD L19MM PS-2 3/8 CIR MCP426H

## (undated) DEVICE — AIRSEAL 8 MM ACCESS PORT AND LOW PROFILE OBTURATOR WITH BLADELESS OPTICAL TIP, 120 MM LENGTH: Brand: AIRSEAL

## (undated) DEVICE — SET ENDOSCP SEAL HYSTEROSCOPE RIG OUTFLO CHN DISP MYOSURE

## (undated) DEVICE — TIP COVER ACCESSORY

## (undated) DEVICE — SEAL

## (undated) DEVICE — APPLICATOR LAP 45CM FLX 2 VISTASEAL

## (undated) DEVICE — INTENT OT USE PROVIDES A STERILE INTERFACE BETWEEN THE OPERATING ROOM SURGICAL LAMPS (NON-STERILE) AND THE SURGEON OR STAFF WORKING IN THE STERILE FIELD.: Brand: ASPEN® ALC PLUS LIGHT HANDLE COVER

## (undated) DEVICE — COVER LT HNDL PLAS RIG 1 PER PK

## (undated) DEVICE — GYN LITHOTOMY: Brand: MEDLINE INDUSTRIES, INC.

## (undated) DEVICE — COVER,MAYO STAND,STERILE: Brand: MEDLINE

## (undated) DEVICE — GLOVE SURG SZ 75 L12IN FNGR THK94MIL STD WHT LTX FREE

## (undated) DEVICE — BLADELESS OBTURATOR: Brand: WECK VISTA

## (undated) DEVICE — SUTURE STRATAFIX SPRL PDS + SZ 0 L9IN ABSRB VLT CT-1 L36MM SXPP1B455

## (undated) DEVICE — GYN LAPAROSCOPY - SMH: Brand: MEDLINE INDUSTRIES, INC.

## (undated) DEVICE — Device

## (undated) DEVICE — SOLUTION IRRIG 1000ML 0.9% SOD CHL USP POUR PLAS BTL

## (undated) DEVICE — TRI-LUMEN FILTERED TUBE SET WITH ACTIVATED CHARCOAL FILTER: Brand: AIRSEAL

## (undated) DEVICE — VCARE MEDIUM, UTERINE MANIPULATOR, VAGINAL-CERVICAL-AHLUWALIA'S-RETRACTOR-ELEVATOR: Brand: VCARE

## (undated) DEVICE — SEALANT TISS 10 ML FIBRIN VISTASEAL

## (undated) DEVICE — SYSTEM EVAC SMOKE LAPARSCOPIC

## (undated) DEVICE — PAD PT POS 36 IN SURGYPAD DISP

## (undated) DEVICE — KIT THERMOABLATION 6MM ENDOMET DEV NOVASURE - SEE COMMENT

## (undated) DEVICE — SURGICEL ENDOSCP APPL

## (undated) DEVICE — PAD BD MATTRESS 73X32 IN STD CONVOLUTED FOAM LTX FREE

## (undated) DEVICE — INSUFFLATION NEEDLE TO ESTABLISH PNEUMOPERITONEUM.: Brand: INSUFFLATION NEEDLE